# Patient Record
Sex: FEMALE | Race: WHITE | NOT HISPANIC OR LATINO | Employment: UNEMPLOYED | URBAN - METROPOLITAN AREA
[De-identification: names, ages, dates, MRNs, and addresses within clinical notes are randomized per-mention and may not be internally consistent; named-entity substitution may affect disease eponyms.]

---

## 2017-12-25 ENCOUNTER — HOSPITAL ENCOUNTER (EMERGENCY)
Facility: HOSPITAL | Age: 42
End: 2017-12-25
Attending: EMERGENCY MEDICINE | Admitting: EMERGENCY MEDICINE
Payer: COMMERCIAL

## 2017-12-25 VITALS
HEART RATE: 98 BPM | DIASTOLIC BLOOD PRESSURE: 88 MMHG | RESPIRATION RATE: 16 BRPM | WEIGHT: 150 LBS | SYSTOLIC BLOOD PRESSURE: 142 MMHG | OXYGEN SATURATION: 100 % | TEMPERATURE: 99 F

## 2017-12-25 DIAGNOSIS — F25.9 SCHIZOAFFECTIVE DISORDER (HCC): ICD-10-CM

## 2017-12-25 DIAGNOSIS — F22 PARANOID (HCC): ICD-10-CM

## 2017-12-25 DIAGNOSIS — F22 PARANOID TYPE DELUSIONAL DISORDER (HCC): Primary | ICD-10-CM

## 2017-12-25 LAB
AMPHETAMINES SERPL QL SCN: NEGATIVE
BARBITURATES UR QL: NEGATIVE
BENZODIAZ UR QL: NEGATIVE
COCAINE UR QL: NEGATIVE
ETHANOL EXG-MCNC: 0 MG/DL
METHADONE UR QL: NEGATIVE
OPIATES UR QL SCN: NEGATIVE
PCP UR QL: NEGATIVE
THC UR QL: NEGATIVE

## 2017-12-25 PROCEDURE — 80307 DRUG TEST PRSMV CHEM ANLYZR: CPT | Performed by: EMERGENCY MEDICINE

## 2017-12-25 PROCEDURE — 99285 EMERGENCY DEPT VISIT HI MDM: CPT

## 2017-12-25 PROCEDURE — 82075 ASSAY OF BREATH ETHANOL: CPT | Performed by: EMERGENCY MEDICINE

## 2017-12-25 RX ORDER — OLANZAPINE 10 MG/1
5 INJECTION, POWDER, LYOPHILIZED, FOR SOLUTION INTRAMUSCULAR ONCE
Status: DISCONTINUED | OUTPATIENT
Start: 2017-12-25 | End: 2017-12-25 | Stop reason: HOSPADM

## 2017-12-25 RX ORDER — CHOLECALCIFEROL (VITAMIN D3) 125 MCG
CAPSULE ORAL
COMMUNITY
Start: 2014-03-25 | End: 2019-12-31 | Stop reason: HOSPADM

## 2017-12-25 RX ORDER — QUETIAPINE FUMARATE 50 MG/1
50 TABLET, FILM COATED ORAL
COMMUNITY
End: 2019-12-31 | Stop reason: HOSPADM

## 2017-12-25 RX ORDER — QUETIAPINE FUMARATE 50 MG/1
TABLET, FILM COATED ORAL
COMMUNITY
Start: 2014-12-31 | End: 2017-12-25

## 2017-12-25 NOTE — ED NOTES
Upon entering pts room, pt asking explaining story about her coming into the hospital today  Pt upset with police, upset with neightbor, pt going on and on about different topics   Pt then asking questions states "must be nice to be your kind of person, to be able to call the police, let me ask you a question, your people, white people, are you born with fur on your body?"      Gila Moore RN  12/25/17 1016

## 2017-12-25 NOTE — ED NOTES
Spoke with Juanpablo Archibald at [facility] ([phone])  Medicare days have been exhausted prior authorization completed  3 days approved LCD/tea being 12/27/17  Call upon arrival for authorization

## 2017-12-25 NOTE — ED NOTES
Information faxed to   [facility]     No Beds at   [address] [facility]

## 2017-12-25 NOTE — ED NOTES
Pt concerned that she should be a 302 and not 201- spoke with ED MD Chester Reilly, RN  12/25/17 4783

## 2017-12-25 NOTE — ED NOTES
Pt refused to have vital signs taken   Pt approaching nurses desk asking about 201 vs 302, explained to pt that crisis was called and will be in to see pt      Laurie Nolan, RN  12/25/17 8887

## 2017-12-25 NOTE — ED PROVIDER NOTES
History  Chief Complaint   Patient presents with    Psychiatric Evaluation     pt brought in by EMS statinmg that someone is going on her Wi-Fi and delelting music playlist  Pt states she feels that people are coming into her appt  This is a 76-year-old female who comes in today for psychiatric evaluation  Known past medical history of her schizoaffective disorder, paranoid delusional disorder  She has spent many years hospitalized including long-term hospitalization at [facility]   The patient states that there are people breaking into her home  She had uterine a mere because the planted there however she states the mere reappeared in her house after she threw it out  She thinks people are logging onto her [employer] account stealing her music and stealing her wifi  The patient presents today in police custody after the police were summoned to her residence I somebody called 911 stating she bent down her door telling him to leave her alone and this individual claimed that the patient had a knife  Upon police arrival no knife was found  She states she did not threaten him  No SI, HI  Denies hallucinations  The patient's mother showed up and told me that they recently decreased the patient's Invega Sustena dose down to 117 mg, and mom noted that this delusions had returned  Previously, at the higher dose, she was doing well, and actually managed to finish college and obtain a bachelor's degree  Prior to Admission Medications   Prescriptions Last Dose Informant Patient Reported? Taking?    Melatonin 5 MG TABS Unknown at Unknown time  Yes No   Sig: Take by mouth   QUEtiapine (SEROquel) 50 mg tablet Unknown at Unknown time Self Yes No   Sig: Take 50 mg by mouth daily at bedtime   paliperidone palmitate (INVEGA SUSTENNA) 234 MG/1 5ML im injection Unknown at Unknown time  Yes No   Sig: Inject into the shoulder, thigh, or buttocks      Facility-Administered Medications: None     Past Medical History:   Diagnosis Date    Schizoaffective disorder ([facility] [address] )      History reviewed  No pertinent surgical history  History reviewed  No pertinent family history  I have reviewed and agree with the history as documented  Social History   Substance Use Topics    Smoking status: Current Every Day Smoker     Packs/day: 3 00    Smokeless tobacco: Never Used    Alcohol use No     Review of Systems   Constitutional: Negative for chills, fatigue, fever and unexpected weight change  HENT: Negative for congestion, rhinorrhea and sore throat  Eyes: Negative for redness and visual disturbance  Respiratory: Negative for cough and shortness of breath  Cardiovascular: Negative for chest pain and leg swelling  Gastrointestinal: Negative for abdominal pain, constipation, diarrhea, nausea and vomiting  Endocrine: Negative for cold intolerance and heat intolerance  Genitourinary: Negative for dysuria, frequency and urgency  Musculoskeletal: Negative for back pain  Skin: Negative for rash  Neurological: Negative for dizziness, syncope and numbness  Psychiatric/Behavioral: Positive for sleep disturbance  Negative for hallucinations and suicidal ideas  All other systems reviewed and are negative  Physical Exam  ED Triage Vitals   Temperature Pulse Respirations Blood Pressure SpO2   12/25/17 0517 12/25/17 0517 12/25/17 0517 12/25/17 0517 12/25/17 0517   99 °F (37 2 °C) (!) 130 18 164/99 96 %      Temp Source Heart Rate Source Patient Position - Orthostatic VS BP Location FiO2 (%)   12/25/17 0517 12/25/17 0517 12/25/17 0517 12/25/17 0517 --   Oral Monitor Sitting Left arm       Pain Score       12/25/17 1515       No Pain         Physical Exam   Constitutional: She is oriented to person, place, and time  She appears well-developed and well-nourished  No distress  HENT:   Head: Normocephalic and atraumatic  Nose: Nose normal    Mouth/Throat: No oropharyngeal exudate     Eyes: Conjunctivae and EOM are normal  Pupils are equal, round, and reactive to light  Neck: Normal range of motion  Neck supple  Cardiovascular: Normal rate, regular rhythm and normal heart sounds  Exam reveals no gallop  No murmur heard  Pulmonary/Chest: Effort normal and breath sounds normal  She has no wheezes  She exhibits no tenderness  Abdominal: Soft  Bowel sounds are normal  She exhibits no distension  There is no tenderness  There is no rebound and no guarding  Musculoskeletal: Normal range of motion  She exhibits no tenderness or deformity  Lymphadenopathy:     She has no cervical adenopathy  Neurological: She is alert and oriented to person, place, and time  No cranial nerve deficit  Skin: Skin is warm and dry  No rash noted  She is not diaphoretic  No erythema  Psychiatric: Her speech is normal  Her affect is labile  She is withdrawn  She is not actively hallucinating  Thought content is paranoid and delusional  Cognition and memory are normal  She expresses impulsivity  She expresses no homicidal and no suicidal ideation  Poor eye contact, appears clean and well nourished  Flight of ideas  Tangential speech with loose associations causing patient to ramble on  Highly paranoid about the motives of the staff and myself  Nursing note and vitals reviewed  ED Medications  Medications - No data to display    Diagnostic Studies  Results Reviewed     Procedure Component Value Units Date/Time    Rapid drug screen, urine [[id number]]  (Normal) Collected:  12/25/17 05    Lab Status:  Final result Specimen:  Urine from Urine, Clean Catch Updated:  12/25/17 0608     Amph/Meth UR Negative     Barbiturate Ur Negative     Benzodiazepine Urine Negative     Cocaine Urine Negative     Methadone Urine Negative     Opiate Urine Negative     PCP Ur Negative     THC Urine Negative    Narrative:         FOR MEDICAL PURPOSES ONLY  IF CONFIRMATION NEEDED PLEASE CONTACT THE LAB WITHIN 5 DAYS      Drug Screen Cutoff Levels:  AMPHETAMINE/METHAMPHETAMINES  1000 ng/mL  BARBITURATES     200 ng/mL  BENZODIAZEPINES     200 ng/mL  COCAINE      300 ng/mL  METHADONE      300 ng/mL  OPIATES      300 ng/mL  PHENCYCLIDINE     25 ng/mL  THC       50 ng/mL    POCT alcohol breath test [[id number]]  (Normal) Resulted:  12/25/17 0548    Lab Status:  Final result Updated:  12/25/17 0549     EXTBreath Alcohol 0 000        No orders to display       Procedures  Procedures    Phone Consults  ED Phone Contact    ED Course    A/P: This is a 36 y o  female who presents to the ED for evaluation of psych evaluation  We will check breath alcohol and UDS  Crisis referral  At this time, I am concerned about the patient's paranoid and delusions  Especially with medication changes  However, without weakness to the alleged event involving in knife, there are no grounds for 302 at this time  The patient's mother discussed with the patient and the patient reluctantly did sign a 201 to get some help to have her medications adjusted  Crisis aware  MDM  CritCare Time    Disposition  Final diagnoses:   Paranoid ([address] )   Paranoid type delusional disorder ([address] )   Schizoaffective disorder ([address] )     Time reflects when diagnosis was documented in both MDM as applicable and the Disposition within this note     Time User Action Codes Description Comment    12/25/2017  4:09 PM Ting Stern U  8  [F22] Paranoid ([address] )     12/25/2017  5:53 PM Sandra Curlin Add [F22] Paranoid type delusional disorder ([address] )     12/25/2017  5:53 PM Sandra Curlin Modify [F22] Paranoid ([address] )     12/25/2017  5:53 PM Sandra Curlin Modify [F22] Paranoid type delusional disorder ([address] )     12/25/2017  5:53 PM Sandra Curlin Add [F25 9] Schizoaffective disorder [facility])       ED Disposition     ED Disposition Condition Comment    Transfer to Another [facility] should be transferred out to [facility]        MD Stiven Viramontes Recent Value   Accepting Physician  Dr Koffi Waite Name, [address]     (Name & Tel number)  Gabriel Johnson  Transported by [employer] and Unit #)  [facility]   Sending MD Dr China Isabel Name, [address]     (Name & Tel number)  Gabriel Johnson  Transported by [employer] and Unit #)  [facility]      Follow-up Information    None       Discharge Medication List as of 12/25/2017  8:51 PM      CONTINUE these medications which have NOT CHANGED    Details   Melatonin 5 MG TABS Take by mouth, Starting Tue 3/25/2014, Historical Med      paliperidone palmitate (INVEGA SUSTENNA) 234 MG/1 5ML im injection Inject into the shoulder, thigh, or buttocks, Starting Wed 3/25/2015, Historical Med      QUEtiapine (SEROquel) 50 mg tablet Take 50 mg by mouth daily at bedtime, Historical Med           No discharge procedures on file  ED Provider  Attending physically available and evaluated Wil Magallanes I managed the patient along with the ED Attending      Electronically Signed by         Kiki Brian MD  Resident  12/25/17 7780

## 2017-12-25 NOTE — ED NOTES
CW made multiple attempts to have patient open up about reason she is in ED  Patient is very paranoid  unwilling to provide information  CW will continue to attempt Pt to disclose information

## 2017-12-25 NOTE — ED ATTENDING ATTESTATION
Jam Sanchez MD, saw and evaluated the patient  I have discussed the patient with the resident/non-physician practitioner and agree with the resident's/non-physician practitioner's findings, Plan of Care, and MDM as documented in the resident's/non-physician practitioner's note, except where noted  All available labs and Radiology studies were reviewed  At this point I agree with the current assessment done in the Emergency Department  I have conducted an independent evaluation of this patient including a focused history of:    Emergency Department Note- Nils Brown 36 y o  female MRN: [id number]    Unit/Bed#: ED 07 Encounter: [id number]    Nils Brown is a 36 y o  female who presents with   Chief Complaint   Patient presents with   [facility] Psychiatric Evaluation     pt brought in by EMS statinmg that someone is going on her Wi-Fi and delelting music playlist  Pt states she feels that people are coming into her appt  History of Present Illness   HPI:  Nils Brown is a 36 y o  female who presents for evaluation of:    Bizarre ideations  The patient believes that individuals are altering her music platelets by going through her wife eye and that multiple people are breaking into her apartment  Review of Systems   Constitutional: Positive for fatigue  Negative for fever  HENT: Negative for ear pain and sore throat  Respiratory: Negative for cough and shortness of breath  Cardiovascular: Negative for chest pain and palpitations  Psychiatric/Behavioral: Positive for dysphoric mood and hallucinations  The patient is nervous/anxious  All other systems reviewed and are negative  Historical Information   History reviewed  No pertinent past medical history  History reviewed  No pertinent surgical history    Social History   History   Alcohol Use No     History   Drug Use No     History   Smoking Status    Current Every Day Smoker    Packs/day: 3 00   Smokeless Tobacco    Never Used Family History: non-contributory    Meds/Allergies   all medications and allergies reviewed  No Known Allergies    Objective   First Vitals:   Blood Pressure: 164/99 (17)  Pulse: (!) 130 (17)  Temperature: 99 °F (37 2 °C) (17)  Temp Source: Oral (17)  Respirations: 18 (17)  Weight - Scale: 68 kg (150 lb) (17)  SpO2: 96 % (17)    Current Vitals:   Blood Pressure: 164/99 (17)  Pulse: (!) 130 (17)  Temperature: 99 °F (37 2 °C) (17)  Temp Source: Oral (17)  Respirations: 18 (17)  Weight - Scale: 68 kg (150 lb) (17)  SpO2: 96 % (17)    No intake or output data in the 24 hours ending 17    Invasive Devices          No matching active lines, drains, or airways          Physical Exam   Constitutional: She is oriented to person, place, and time  She appears well-developed and well-nourished  HENT:   Head: Normocephalic and atraumatic  Eyes: Pupils are equal, round, and reactive to light  Cardiovascular: Regular rhythm  Tachycardia present  Pulmonary/Chest: Effort normal and breath sounds normal    Abdominal: Soft  Bowel sounds are normal    Musculoskeletal: Normal range of motion  She exhibits no deformity  Neurological: She is alert and oriented to person, place, and time  Skin: Skin is warm and dry  Psychiatric:   The patient's thought content, judgment, and decision-making capacity are impaired secondary  to acute psychosis   Nursing note and vitals reviewed  Medical Decision Makin  Acute psychosis: Plan to have the psychiatric  see the patient  Plan to obtain a breath alcohol test rule out alcohol intoxication; plan to obtain urine drug screen to rule out drugs of abuse  No results found for this or any previous visit (from the past 36 hour(s))    No orders to display         Portions of the record may have been created with voice recognition software  Occasional wrong word or "sound a like" substitutions may have occurred due to the inherent limitations of voice recognition software  Read the chart carefully and recognize, using context, where substitutions have occurred

## 2017-12-25 NOTE — ED NOTES
Patient accepted by Dr Miguel Angel Damian at [facility]  Transport arranged with [facility]  at 1900  Mariana Victoria at [id number] [facility] aware of ETA

## 2017-12-25 NOTE — ED NOTES
CW worker able to have patient disclose why she was brought into ED this morning  Pt stated that her neighbors were harassing her this morning, Pt asked her neighbor to "leave her alone" her neighbor got mad at her for knocking on their door at 5am because they are [employer]  Neighbor then called the police on Pt due to patient harassing them  Patient appears to be very paranoid  During conversation Pt unable to stay on the topic  She expressed no SI/HI or VH/AH but is is very paranoid  Patient states she has been in and out of inpatient psych stays since 1999  Patient stated a doctor told her that she was Paranoid schizophrenia  Patient expressed she sees a Psychiatrist and therapist but was not willing to disclose names or last time see had a visit  Pt is cooperative but very paranoid on staff's motives  Patient agreeable for inpatient psych stay  201 was signed this morning

## 2017-12-25 NOTE — ED NOTES
Pt sitting in the corner of room with blanket over head   Pt does not have any belongings in room other than blanket and food      Juanis Cuenca RN  12/25/17 1539

## 2017-12-25 NOTE — ED NOTES
Spoke to on call Crisis worker Paulino   States that he will come in to do bed search for patient      Honor , WOLF  12/25/17 3211

## 2017-12-25 NOTE — EMTALA/ACUTE CARE TRANSFER
[facility]  Dept: Uday Bal                                         1975                              MRN [id number]    I have been informed of my rights regarding examination, treatment, and transfer   by Dr Mayur Naranjo MD    Benefits:      Risks:        Consent for Transfer:  I acknowledge that my medical condition has been evaluated and explained to me by the emergency department physician or other qualified medical person and/or my attending physician, who has recommended that I be transferred to the service of  Accepting Physician: Dr Monie Landaverde  at [address] Name, [address] : [facility]   The above potential benefits of such transfer, the potential risks associated with such transfer, and the probable risks of not being transferred have been explained to me, and I fully understand them  The doctor has explained that, in my case, the benefits of transfer outweigh the risks  I agree to be transferred  I authorize the performance of emergency medical procedures and treatments upon me in both transit and upon arrival at the receiving facility  Additionally, I authorize the release of any and all medical records to the receiving facility and request they be transported with me, if possible  I understand that the safest mode of transportation during a medical emergency is an ambulance and that the Hospital advocates the use of this mode of transport  Risks of traveling to the receiving facility by car, including absence of medical control, life sustaining equipment, such as oxygen, and medical personnel has been explained to me and I fully understand them  (GOPI CORRECT BOX BELOW)  [  ]  I consent to the stated transfer and to be transported by ambulance/helicopter    [  ]  I consent to the stated transfer, but refuse transportation by ambulance and accept full responsibility for my transportation by car  I understand the risks of non-ambulance transfers and I exonerate the Hospital and its staff from any deterioration in my condition that results from this refusal     X___________________________________________    DATE  17  TIME________  Signature of patient or legally responsible individual signing on patient behalf           RELATIONSHIP TO PATIENT_________________________          Provider Certification    NAME Saúl Alegria                                         1975                              MRN [id number]    A medical screening exam was performed on the above named patient  Based on the examination:    Condition Necessitating Transfer The primary encounter diagnosis was Paranoid type delusional disorder ([address] )  Diagnoses of Paranoid ([address] ) and Schizoaffective disorder ([address] ) were also pertinent to this visit  Patient Condition:      Reason for Transfer:      Transfer Requirements: [address]    · Space available and qualified personnel available for treatment as acknowledged by [employer]    · Agreed to accept transfer and to provide appropriate medical treatment as acknowledged by       Dr Guille Melo   · Appropriate medical records of the examination and treatment of the patient are provided at the time of transfer   [address], [address] _______  · Transfer will be performed by qualified personnel from [facility]  and appropriate transfer equipment as required, including the use of necessary and appropriate life support measures      Provider Certification: I have examined the patient and explained the following risks and benefits of being transferred/refusing transfer to the patient/family:         Based on these reasonable risks and benefits to the patient and/or the unborn child(juan), and based upon the information available at the time of the patients examination, I certify that the medical benefits reasonably to be expected from the provision of appropriate medical treatments at another medical facility outweigh the increasing risks, if any, to the individuals medical condition, and in the case of labor to the unborn child, from effecting the transfer      X____________________________________________ DATE 12/25/17        TIME_______      ORIGINAL - SEND TO MEDICAL RECORDS   COPY - SEND WITH PATIENT DURING TRANSFER

## 2017-12-26 NOTE — ED NOTES
Pt cooperative for EMS,  Brief report given to EMS with belongings        Teresa Eastman RN  12/25/17 Junior Guerrero

## 2017-12-26 NOTE — ED NOTES
Med order placed after pt on stretcher for EMS, discussed with physician need for medication in agreement no meds needed at this time       Romero Cm RN  12/25/17 1927

## 2019-12-17 ENCOUNTER — HOSPITAL ENCOUNTER (EMERGENCY)
Facility: HOSPITAL | Age: 44
End: 2019-12-18
Attending: EMERGENCY MEDICINE | Admitting: EMERGENCY MEDICINE
Payer: COMMERCIAL

## 2019-12-17 DIAGNOSIS — R45.851 SUICIDAL IDEATION: Primary | ICD-10-CM

## 2019-12-17 DIAGNOSIS — F29 PSYCHOTIC DISORDER (HCC): ICD-10-CM

## 2019-12-17 DIAGNOSIS — F22 PARANOID BEHAVIOR (HCC): ICD-10-CM

## 2019-12-17 LAB
AMPHETAMINES SERPL QL SCN: NEGATIVE
BARBITURATES UR QL: NEGATIVE
BENZODIAZ UR QL: NEGATIVE
COCAINE UR QL: NEGATIVE
ETHANOL EXG-MCNC: 0 MG/DL
EXT PREG TEST URINE: NEGATIVE
EXT. CONTROL ED NAV: NORMAL
METHADONE UR QL: NEGATIVE
OPIATES UR QL SCN: NEGATIVE
PCP UR QL: NEGATIVE
THC UR QL: NEGATIVE

## 2019-12-17 PROCEDURE — 80307 DRUG TEST PRSMV CHEM ANLYZR: CPT | Performed by: EMERGENCY MEDICINE

## 2019-12-17 PROCEDURE — 82075 ASSAY OF BREATH ETHANOL: CPT | Performed by: EMERGENCY MEDICINE

## 2019-12-17 PROCEDURE — 99285 EMERGENCY DEPT VISIT HI MDM: CPT

## 2019-12-17 PROCEDURE — 99285 EMERGENCY DEPT VISIT HI MDM: CPT | Performed by: EMERGENCY MEDICINE

## 2019-12-17 PROCEDURE — 81025 URINE PREGNANCY TEST: CPT | Performed by: EMERGENCY MEDICINE

## 2019-12-18 ENCOUNTER — HOSPITAL ENCOUNTER (INPATIENT)
Facility: HOSPITAL | Age: 44
LOS: 13 days | Discharge: HOME | DRG: 885 | End: 2019-12-31
Attending: PSYCHIATRY & NEUROLOGY | Admitting: PSYCHIATRY & NEUROLOGY
Payer: COMMERCIAL

## 2019-12-18 VITALS
RESPIRATION RATE: 20 BRPM | BODY MASS INDEX: 31.64 KG/M2 | TEMPERATURE: 98.6 F | SYSTOLIC BLOOD PRESSURE: 138 MMHG | WEIGHT: 181.44 LBS | OXYGEN SATURATION: 100 % | DIASTOLIC BLOOD PRESSURE: 87 MMHG | HEART RATE: 79 BPM

## 2019-12-18 DIAGNOSIS — F29 PSYCHOTIC DISORDER (HCC): ICD-10-CM

## 2019-12-18 DIAGNOSIS — H61.20 EXCESS EAR WAX: ICD-10-CM

## 2019-12-18 DIAGNOSIS — T78.40XA ALLERGIES: ICD-10-CM

## 2019-12-18 DIAGNOSIS — F25.9 SCHIZOAFFECTIVE DISORDER, CHRONIC CONDITION WITH ACUTE EXACERBATION (HCC): Primary | ICD-10-CM

## 2019-12-18 PROBLEM — Z00.8 MEDICAL CLEARANCE FOR PSYCHIATRIC ADMISSION: Status: ACTIVE | Noted: 2019-12-18

## 2019-12-18 PROBLEM — R03.0 ELEVATED BP WITHOUT DIAGNOSIS OF HYPERTENSION: Status: ACTIVE | Noted: 2019-12-18

## 2019-12-18 PROBLEM — Z72.0 TOBACCO ABUSE: Status: ACTIVE | Noted: 2019-12-18

## 2019-12-18 PROCEDURE — 99222 1ST HOSP IP/OBS MODERATE 55: CPT | Performed by: INTERNAL MEDICINE

## 2019-12-18 PROCEDURE — 99222 1ST HOSP IP/OBS MODERATE 55: CPT | Performed by: PSYCHIATRY & NEUROLOGY

## 2019-12-18 RX ORDER — TRAZODONE HYDROCHLORIDE 50 MG/1
50 TABLET ORAL
Status: DISCONTINUED | OUTPATIENT
Start: 2019-12-18 | End: 2019-12-31 | Stop reason: HOSPADM

## 2019-12-18 RX ORDER — BENZTROPINE MESYLATE 1 MG/1
1 TABLET ORAL EVERY 6 HOURS PRN
Status: CANCELLED | OUTPATIENT
Start: 2019-12-18

## 2019-12-18 RX ORDER — HYDROXYZINE HYDROCHLORIDE 25 MG/1
25 TABLET, FILM COATED ORAL EVERY 4 HOURS PRN
Status: DISCONTINUED | OUTPATIENT
Start: 2019-12-18 | End: 2019-12-31 | Stop reason: HOSPADM

## 2019-12-18 RX ORDER — LORAZEPAM 2 MG/ML
2 INJECTION INTRAMUSCULAR EVERY 4 HOURS PRN
Status: CANCELLED | OUTPATIENT
Start: 2019-12-18

## 2019-12-18 RX ORDER — ACETAMINOPHEN 325 MG/1
650 TABLET ORAL EVERY 6 HOURS PRN
Status: DISCONTINUED | OUTPATIENT
Start: 2019-12-18 | End: 2019-12-31 | Stop reason: HOSPADM

## 2019-12-18 RX ORDER — LORAZEPAM 2 MG/ML
2 INJECTION INTRAMUSCULAR EVERY 4 HOURS PRN
Status: DISCONTINUED | OUTPATIENT
Start: 2019-12-18 | End: 2019-12-23

## 2019-12-18 RX ORDER — TRAZODONE HYDROCHLORIDE 50 MG/1
50 TABLET ORAL
Status: CANCELLED | OUTPATIENT
Start: 2019-12-18

## 2019-12-18 RX ORDER — BENZTROPINE MESYLATE 1 MG/ML
1 INJECTION INTRAMUSCULAR; INTRAVENOUS EVERY 6 HOURS PRN
Status: CANCELLED | OUTPATIENT
Start: 2019-12-18

## 2019-12-18 RX ORDER — NICOTINE 21 MG/24HR
1 PATCH, TRANSDERMAL 24 HOURS TRANSDERMAL DAILY
Status: CANCELLED | OUTPATIENT
Start: 2019-12-18

## 2019-12-18 RX ORDER — HYDROXYZINE HYDROCHLORIDE 25 MG/1
25 TABLET, FILM COATED ORAL EVERY 4 HOURS PRN
Status: CANCELLED | OUTPATIENT
Start: 2019-12-18

## 2019-12-18 RX ORDER — NICOTINE 21 MG/24HR
1 PATCH, TRANSDERMAL 24 HOURS TRANSDERMAL DAILY
Status: DISCONTINUED | OUTPATIENT
Start: 2019-12-18 | End: 2019-12-18

## 2019-12-18 RX ORDER — OLANZAPINE 5 MG/1
5 TABLET ORAL
Status: DISCONTINUED | OUTPATIENT
Start: 2019-12-18 | End: 2019-12-31 | Stop reason: HOSPADM

## 2019-12-18 RX ORDER — OLANZAPINE 10 MG/1
5 INJECTION, POWDER, LYOPHILIZED, FOR SOLUTION INTRAMUSCULAR
Status: CANCELLED | OUTPATIENT
Start: 2019-12-18

## 2019-12-18 RX ORDER — BENZTROPINE MESYLATE 1 MG/ML
1 INJECTION INTRAMUSCULAR; INTRAVENOUS EVERY 6 HOURS PRN
Status: DISCONTINUED | OUTPATIENT
Start: 2019-12-18 | End: 2019-12-31 | Stop reason: HOSPADM

## 2019-12-18 RX ORDER — ACETAMINOPHEN 325 MG/1
650 TABLET ORAL EVERY 6 HOURS PRN
Status: CANCELLED | OUTPATIENT
Start: 2019-12-18

## 2019-12-18 RX ORDER — LORAZEPAM 0.5 MG/1
1 TABLET ORAL EVERY 4 HOURS PRN
Status: CANCELLED | OUTPATIENT
Start: 2019-12-18

## 2019-12-18 RX ORDER — LORAZEPAM 1 MG/1
1 TABLET ORAL EVERY 4 HOURS PRN
Status: DISCONTINUED | OUTPATIENT
Start: 2019-12-18 | End: 2019-12-31 | Stop reason: HOSPADM

## 2019-12-18 RX ORDER — MAGNESIUM HYDROXIDE/ALUMINUM HYDROXICE/SIMETHICONE 120; 1200; 1200 MG/30ML; MG/30ML; MG/30ML
30 SUSPENSION ORAL EVERY 4 HOURS PRN
Status: CANCELLED | OUTPATIENT
Start: 2019-12-18

## 2019-12-18 RX ORDER — OLANZAPINE 5 MG/1
5 TABLET ORAL
Status: CANCELLED | OUTPATIENT
Start: 2019-12-18

## 2019-12-18 RX ORDER — MAGNESIUM HYDROXIDE/ALUMINUM HYDROXICE/SIMETHICONE 120; 1200; 1200 MG/30ML; MG/30ML; MG/30ML
30 SUSPENSION ORAL EVERY 4 HOURS PRN
Status: DISCONTINUED | OUTPATIENT
Start: 2019-12-18 | End: 2019-12-31 | Stop reason: HOSPADM

## 2019-12-18 RX ORDER — OLANZAPINE 10 MG/1
5 INJECTION, POWDER, LYOPHILIZED, FOR SOLUTION INTRAMUSCULAR
Status: DISCONTINUED | OUTPATIENT
Start: 2019-12-18 | End: 2019-12-31 | Stop reason: HOSPADM

## 2019-12-18 RX ORDER — QUETIAPINE FUMARATE 50 MG/1
50 TABLET, FILM COATED ORAL
Status: DISCONTINUED | OUTPATIENT
Start: 2019-12-18 | End: 2019-12-19

## 2019-12-18 RX ORDER — BENZTROPINE MESYLATE 1 MG/1
1 TABLET ORAL EVERY 6 HOURS PRN
Status: DISCONTINUED | OUTPATIENT
Start: 2019-12-18 | End: 2019-12-31 | Stop reason: HOSPADM

## 2019-12-18 RX ADMIN — PALIPERIDONE PALMITATE 156 MG: 156 INJECTION INTRAMUSCULAR at 18:06

## 2019-12-18 NOTE — ED NOTES
Patient belongings placed in [address] [facility] room     [facility]  12/17/19 1926
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
## 2019-12-18 NOTE — PROGRESS NOTES
[employer] NOTE     12/18/19 0900   Activity/Group Checklist   Group Community meeting   Attendance Attended   Attendance Duration (min) 16-30   Interactions Interacted appropriately  (Easily distracted by peers, difficulty remaining on topic  )   Affect/Mood Appropriate   Goals Achieved Able to listen to others; Able to engage in interactions; Able to self-disclose   Objectives/Key Points:  Living intentionally  Goal Setting  Thought for the Day  Self Check In
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
## 2019-12-18 NOTE — ED NOTES
Patient was accepted at [facility] by Dr Earnest Rodriguez  Transport was scheduled with Ge/ALVIN for p/u at [phone]  Ge advised if anything would change, he would call the ED  Report to be called prior to transfer at [phone]

## 2019-12-18 NOTE — ASSESSMENT & PLAN NOTE
Vitals:    12/18/19 0900   BP: 145/93   Pulse: 89   Resp: 16   Temp: 97 9 °F (36 6 °C)   SpO2: 100%     Patient's blood pressure noted to be elevated, will advise nursing staff to continue to monitor and notify IM if blood pressures remain elevated, may need to consider starting blood pressure medication
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
## 2019-12-18 NOTE — ED NOTES
201 faxed to [facility] for review at [facility]       ROSELYN Goff  12/17/19   4922

## 2019-12-18 NOTE — ASSESSMENT & PLAN NOTE
Patient cleared for psychiatric admission, no labs or EKG available at time of consult  Please continue to monitor patient's blood pressures if consistent elevations may need to start blood pressure medication  We will now sign off please feel free to call if further assistance is needed form internal medicine
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
## 2019-12-18 NOTE — ED NOTES
Dr Joshua Burton at bedside       Davin Wilson  12/17/19 [address] [facility]  12/17/19 1940 135
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
## 2019-12-18 NOTE — DISCHARGE INSTR - OTHER ORDERS
[facility] Crisis Phone Number : [address] [place] [address] Crisis Phone Number : [phone]    National Suicide Hotline : 1 976.226.9311    Crisis Text Line : 587844    The [facility] Family-to-Family Education Program is a free 12-week (2 1/2 hours/week) course for families of individuals with severe brain disorders (mental illnesses)  The classes are taught by trained family members  All course materials are furnished at no cost to you  Below are some details  To register, e-mail [email or web address] or call [phone]  The curriculum focuses on schizophrenia, bipolar disorder (manic depression), clinical depression, panic disorders and obsessive-compulsive disorder (OCD)  The course discusses the clinical treatment of these illnesses and teaches the knowledge and skills that family members need to cope more effectively  Topics Include:   Learning about feelings, learning about facts    Schizophrenia, major depression and damian: diagnosis and dealing with critical periods    Subtypes of depression and bipolar disorder, panic disorder and OCD; diagnosis and causes; sharing our stories    The biology of the brain/new research    Problem solving workshops    Medication review    Empathy workshop  what its like to have a brain disorder    Communication skills workshop    Self-care and relative groups   [facility], services available    Advocacy: fighting stigma    Review and certification ceremony    [facility] Education Course  The [employer] Education class is a ten week  two hours per week  experiential education course on the topic of recovery for any person with serious mental illness who is interested in establishing and maintaining wellness  The course uses a combination of lecture, interactive exercises, and structural group processes  The diversity of experience among participants affords for a lively dynamic that moves the course along    [facility]-fw[facility]Ddj[facility] Education class is offered free of charge to people who experience mental illness  You do not need to be a member of [facility] to take the course  Courses are taught by teams of trained mentors/peer-teachers who are themselves experienced at living well with mental illness  Below are some details  To register, call [phone] or e-mail [email or web address]  Sign up today! [facility] group is for family members, caregivers, and loved ones of individuals living with the everyday challenges of mental illness  The leaders are family members in the same situation  Sessions take place in an intimate, confidential setting to allow families to share openly with each other  These support groups allow participants to learn from the experiences of other group members, share coping strategies, and offer each other encouragement and understanding  Regine Mcclellan know that you are not alone  Drop inno registration is necessary  Here are the times and locations  JANE  Monthly: 3rd Monday, 7:00-8:30 pm  [facility] [place]  Monthly: 4th Tuesday, 7:00-8:30 pm  [address] [facility]  Monthly: 1st Monday, 7:00-8:30 pm  [address]         Monthly Support for Persons with Mental Illness  The Peer Support Group is a monthly meeting for individuals facing the challenges of recovering from severe and persistent mental illness  Depression, manic depression, schizophrenia, and general anxiety disorder are only a few of the diagnoses of individuals who have found a supportive place at our meetings  Our [place]  We are a fellowship of individuals who share a common goal of recovery and the ability to maintain mental and emotional stability  We help others and ourselves through sharing our experiences, strength and hope with each other   No matter how traumatic our past or how despairing our present may be, there is hope for a new day  Sessions take place in an intimate, confidential setting to allow individuals to share openly with each other  Bryant Lucks know that you are not alone  Drop inno registration is necessary  Here are the times and locations    ANITHA  Monthly: 1st Monday, 7:00-8:30 pm  [facility]  [address] [place], [place]   [id number]  Monthly: 3rd Monday, 7:00-8:30 pm  [address]  [address] [facility]

## 2019-12-18 NOTE — PROGRESS NOTES
12/18/19 1300   Activity/Group Checklist   Group   (recovery group)   Attendance Attended   Attendance Duration (min) 31-45  (casemanager came left early)   Interactions Interacted appropriately   Affect/Mood Blunted/flat   Goals Achieved Able to listen to others
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
## 2019-12-18 NOTE — EMTALA/ACUTE CARE TRANSFER
[facility] [address]  Dept: [phone]      HZLOON TRANSFER CONSENT    NAME Anisa Kidd                                         1975                              MRN [id number]    I have been informed of my rights regarding examination, treatment, and transfer   by Dr Gladis Zuleta MD    Benefits: Specialized equipment and/or services available at the receiving facility (Include comment)________________________(behavioral health)    Risks: Potential for delay in receiving treatment, Increased discomfort during transfer      Consent for Transfer:  I acknowledge that my medical condition has been evaluated and explained to me by the emergency department physician or other qualified medical person and/or my attending physician, who has recommended that I be transferred to the service of  Accepting Physician: Rahul Jose at [address] Name, [address] : [facility]  The above potential benefits of such transfer, the potential risks associated with such transfer, and the probable risks of not being transferred have been explained to me, and I fully understand them  The doctor has explained that, in my case, the benefits of transfer outweigh the risks  I agree to be transferred  I authorize the performance of emergency medical procedures and treatments upon me in both transit and upon arrival at the receiving facility  Additionally, I authorize the release of any and all medical records to the receiving facility and request they be transported with me, if possible  I understand that the safest mode of transportation during a medical emergency is an ambulance and that the Hospital advocates the use of this mode of transport   Risks of traveling to the receiving facility by car, including absence of medical control, life sustaining equipment, such as oxygen, and medical personnel has been explained to me and I fully understand them     ([facility])  [  ]  I consent to the stated transfer and to be transported by ambulance/helicopter  [  ]  I consent to the stated transfer, but refuse transportation by ambulance and accept full responsibility for my transportation by car  I understand the risks of non-ambulance transfers and I exonerate the Hospital and its staff from any deterioration in my condition that results from this refusal     X___________________________________________    DATE  19  TIME________  Signature of patient or legally responsible individual signing on patient behalf           RELATIONSHIP TO PATIENT_________________________          Provider Certification    NAME Shiva Jerez                                        [facility] 1975                              MRN [id number]    A medical screening exam was performed on the above named patient  Based on the examination:    Condition Necessitating Transfer The primary encounter diagnosis was Suicidal ideation  Diagnoses of Psychotic disorder ([facility] [address] ) and Paranoid behavior ([facility] [address] ) were also pertinent to this visit      Patient Condition: The patient has been stabilized such that within reasonable medical probability, no material deterioration of the patient condition or the condition of the unborn child(juan) is likely to result from the transfer    Reason for Transfer: Level of Care needed not available at this facility(behavioral health)    Transfer Requirements: Facility [facility]   · Space available and qualified personnel available for treatment as acknowledged by    · Agreed to accept transfer and to provide appropriate medical treatment as acknowledged by       [facility]  · Appropriate medical records of the examination and treatment of the patient are provided at the time of transfer   [address] _______  · Transfer will be performed by qualified personnel from [facility]  and appropriate transfer equipment as required, including the use of necessary and appropriate life support measures  Provider Certification: I have examined the patient and explained the following risks and benefits of being transferred/refusing transfer to the patient/family:  General risk, such as traffic hazards, adverse weather conditions, rough terrain or turbulence, possible failure of equipment (including vehicle or aircraft), or consequences of actions of persons outside the control of the transport personnel, Unanticipated needs of medical equipment and personnel during transport, Risk of worsening condition      Based on these reasonable risks and benefits to the patient and/or the unborn child(juan), and based upon the information available at the time of the patients examination, I certify that the medical benefits reasonably to be expected from the provision of appropriate medical treatments at another medical facility outweigh the increasing risks, if any, to the individuals medical condition, and in the case of labor to the unborn child, from effecting the transfer      X____________________________________________ DATE 12/18/19        TIME_______      ORIGINAL - SEND TO MEDICAL RECORDS   COPY - SEND WITH PATIENT DURING TRANSFER

## 2019-12-18 NOTE — CONSULTS
Telepsychiatry Telephone Admission Note    I was consulted by phone to review the case of this 36yo woman who was medically cleared and admitted for psychosis  PMH negative for significant medical issues  NKDA  AVSS, reassuring admission labs  UDS/BAL negative  Pregnancy test negative  Plan:   --Admit to psychiatry under voluntary status  --Suicide precautions  --Routine admission orders placed

## 2019-12-18 NOTE — PROGRESS NOTES
[employer] NOTE     12/18/19 1000   Activity/Group Checklist   Group Other (Comment)  (Altruism/Sharing Gifts)   Attendance Attended   Attendance Duration (min) Greater than 60   Interactions Interacted appropriately  (Sat among peers  Task focused  Minimal interaction  )   Affect/Mood Appropriate   Goals Achieved Able to listen to others; Able to engage in interactions; Able to reflect/comment on own behavior  (Discussed Family Norms and Expectations/cultural factors  )

## 2019-12-18 NOTE — ED NOTES
Insurance Authorization:    [employer] [facility] form faxed for authorization  Spoke to [employer] @ [facility] [phone] for COB  7-Day COB Authorization, requesting contact when patient arrives

## 2019-12-18 NOTE — PROGRESS NOTES
Coleen Sneed was given her [place]  She said that she is currently homeless  Said no matter where she lived she felt like someone was out to get her and it could be gangs,or just being stalked  Pleasant and cooperative and somewhat social with peers

## 2019-12-19 LAB
ALBUMIN SERPL BCP-MCNC: 3.7 G/DL (ref 3–5.2)
ALP SERPL-CCNC: 64 U/L (ref 43–122)
ALT SERPL W P-5'-P-CCNC: 18 U/L (ref 9–52)
ANION GAP SERPL CALCULATED.3IONS-SCNC: 8 MMOL/L (ref 5–14)
AST SERPL W P-5'-P-CCNC: 15 U/L (ref 14–36)
BASOPHILS # BLD AUTO: 0 THOUSANDS/ΜL (ref 0–0.1)
BASOPHILS NFR BLD AUTO: 1 % (ref 0–1)
BILIRUB SERPL-MCNC: 0.4 MG/DL
BUN SERPL-MCNC: 9 MG/DL (ref 5–25)
CALCIUM SERPL-MCNC: 9.1 MG/DL (ref 8.4–10.2)
CHLORIDE SERPL-SCNC: 105 MMOL/L (ref 97–108)
CHOLEST SERPL-MCNC: 175 MG/DL
CO2 SERPL-SCNC: 26 MMOL/L (ref 22–30)
CREAT SERPL-MCNC: 0.79 MG/DL (ref 0.6–1.2)
EOSINOPHIL # BLD AUTO: 0.3 THOUSAND/ΜL (ref 0–0.4)
EOSINOPHIL NFR BLD AUTO: 3 % (ref 0–6)
ERYTHROCYTE [DISTWIDTH] IN BLOOD BY AUTOMATED COUNT: 14.4 %
EST. AVERAGE GLUCOSE BLD GHB EST-MCNC: 137 MG/DL
GFR SERPL CREATININE-BSD FRML MDRD: 91 ML/MIN/1.73SQ M
GLUCOSE SERPL-MCNC: 115 MG/DL (ref 70–99)
HBA1C MFR BLD: 6.4 % (ref 4.2–6.3)
HCT VFR BLD AUTO: 39.7 % (ref 36–46)
HDLC SERPL-MCNC: 21 MG/DL
HGB BLD-MCNC: 13.6 G/DL (ref 12–16)
LDLC SERPL CALC-MCNC: 131 MG/DL
LYMPHOCYTES # BLD AUTO: 2.1 THOUSANDS/ΜL (ref 0.5–4)
LYMPHOCYTES NFR BLD AUTO: 23 % (ref 25–45)
MCH RBC QN AUTO: 29.6 PG (ref 26–34)
MCHC RBC AUTO-ENTMCNC: 34.2 G/DL (ref 31–36)
MCV RBC AUTO: 87 FL (ref 80–100)
MONOCYTES # BLD AUTO: 0.5 THOUSAND/ΜL (ref 0.2–0.9)
MONOCYTES NFR BLD AUTO: 6 % (ref 1–10)
NEUTROPHILS # BLD AUTO: 6.3 THOUSANDS/ΜL (ref 1.8–7.8)
NEUTS SEG NFR BLD AUTO: 68 % (ref 45–65)
NONHDLC SERPL-MCNC: 154 MG/DL
PLATELET # BLD AUTO: 336 THOUSANDS/UL (ref 150–450)
PMV BLD AUTO: 8.3 FL (ref 8.9–12.7)
POTASSIUM SERPL-SCNC: 3.6 MMOL/L (ref 3.6–5)
PROT SERPL-MCNC: 7.2 G/DL (ref 5.9–8.4)
RBC # BLD AUTO: 4.59 MILLION/UL (ref 4–5.2)
SODIUM SERPL-SCNC: 139 MMOL/L (ref 137–147)
TRIGL SERPL-MCNC: 114 MG/DL
TSH SERPL DL<=0.05 MIU/L-ACNC: 1.39 UIU/ML (ref 0.47–4.68)
WBC # BLD AUTO: 9.3 THOUSAND/UL (ref 4.5–11)

## 2019-12-19 PROCEDURE — 99232 SBSQ HOSP IP/OBS MODERATE 35: CPT | Performed by: NURSE PRACTITIONER

## 2019-12-19 PROCEDURE — 80061 LIPID PANEL: CPT | Performed by: PSYCHIATRY & NEUROLOGY

## 2019-12-19 PROCEDURE — 85025 COMPLETE CBC W/AUTO DIFF WBC: CPT | Performed by: PSYCHIATRY & NEUROLOGY

## 2019-12-19 PROCEDURE — 83036 HEMOGLOBIN GLYCOSYLATED A1C: CPT | Performed by: NURSE PRACTITIONER

## 2019-12-19 PROCEDURE — 84443 ASSAY THYROID STIM HORMONE: CPT | Performed by: PSYCHIATRY & NEUROLOGY

## 2019-12-19 PROCEDURE — 80053 COMPREHEN METABOLIC PANEL: CPT | Performed by: PSYCHIATRY & NEUROLOGY

## 2019-12-19 NOTE — TREATMENT PLAN
TREATMENT PLAN REVIEW - [facility] 38 y o  1975 female MRN: [id number]    [address] [address] Room / Bed: Lissette Jimenes [facility] Encounter: [id number]          Admit Date/Time:  12/18/2019  8:56 AM    Treatment Team: Attending Provider: Bijan Hatfield MD; Consulting Physician: Yoshi Loyd; Patient Care Assistant: Lamonte Murdock;  Patient Care Technician: Cele Murry; Patient Care Technician: Sofía Pino; Registered Nurse: Patel Haji RN    Diagnosis: Principal Problem:    Schizoaffective disorder, chronic condition with acute exacerbation ([facility] [address] )  Active Problems:    Elevated BP without diagnosis of hypertension    Tobacco abuse    Medical clearance for psychiatric admission    Patient Strengths: compliant with medication     Patient Barriers: chronic mental illness    Short Term Goals: decrease in anxiety symptoms, ability to stay safe on the unit    Long Term Goals: stabilization of mood, improved reality testing    Progress Towards Goals: starting psychitric medications as prescribed, continue psychiatric medications as prescribed    Recommended Treatment: medication management, patient medication education, group therapy, milieu therapy, continued Behavioral Health psychiatric evaluation/assessment process     Treatment Frequency: daily medication monitoring, group and milieu therapy daily, monitoring through interdisciplinary rounds, monitoring through weekly patient care conferences    Expected Discharge Date:  5-7 days    Discharge Plan: referral for outpatient medication management with a psychiatrist, referral for outpatient psychotherapy, referral to [address] [place] [address] Team for close psychiatric monitoring    Treatment Plan Created/Updated By: Bijan Hatfield MD

## 2019-12-19 NOTE — PROGRESS NOTES
12/19/19 0841   Team Meeting   Meeting Type Daily Rounds   Initial Conference Date 12/19/19   Team Members Present   Team Members Present Physician;Nurse;   Physician Team Member Claudia ROSAS   Nursing Team Member [place] Management Team Member Megan Herbert   Patient/Family Present   Patient Present No   Patient's Family Present No   New admission, chart and labs were reviewed  Meds to be discussed and started  Discharge to be determined

## 2019-12-19 NOTE — PROGRESS NOTES
12/19/19 1233   Team Meeting   Meeting Type Tx Team Meeting   Initial Conference Date 12/19/19   Team Members Present   Team Members Present Physician;Nurse;   Physician Team Member Dr Annetta Hahn Team Member [facility] Team Member Warren Moore   Patient/Family Present   Patient Present Yes   Patient's Family Present No   Pt seen for tx team meeting  Psychiatrist reviewed tx plan with pt  Pt explained that she would not "cry to certain people" about "being gangstalked"  Pt reports being gangstalked is like being bullied  Pt explained members of [facility] were gangstalking her  Pt reports they make fun of her because her skin is brown, she is poor and is a different species  Pt reports the gangstalkers are of a different species  Pt explained that once she is discharged, there is to be no further relationship with the staff at the hospital  Pt hesitant to sign tx plan  Psychiatrist reviewed tx plan with pt  Nurse made copy of tx plan for pt  Pt then signed the plan  Strengths: Med compliant, outpatient intake already scheduled, has insurance  Barriers: chronic mental health, lack of supports, homeless

## 2019-12-19 NOTE — PROGRESS NOTES
Patient isolative to her room this morning  Patient states she "feels better than when she came in" Denies anxiety, depression, S/HI,A/VH and pain  Meal compliant  Isolative and withdrawn  Pleasant  Will continue to monitor and therapeutic support

## 2019-12-19 NOTE — PROGRESS NOTES
Progress Note - [address] Quijano 38 y o  female MRN: [id number]  Unit/Bed#: [facility] 124-46 Encounter: [id number]    The patient was seen for continuing care and reviewed with treatment team  Staff reports that the patient remains paranoid, delusional, and self isolating to room  During assessment the patient continues to exhibit rambling speech, disorganized thought process, and increased anxiety and paranoia  Patient reports "I can't take Seroquel  I used to take that and I missed it for days  Then I had insomnia for months " Patient continues to report depression a 5 out of 10 and mood lability  Patient continues to report that people are stalking her and states "this relationship is business  You will not follow me into my home " Patient denies any auditory or visual hallucinations  Denies any thoughts to hurt herself or others  Patient received Beronica Juarez yesterday  No current medication changes   Will discontinue Seroquel    Mental Status Evaluation:  Appearance:  Adequate hygiene and grooming   Behavior:  cooperative; pleasant on approach   Fund of knowledge  Diminished   Speech:   Language: Rapid and Pressured; rambling  No overt abnormality   Mood:  irritable   Affect:   Associations: labile  Loosely connected   Thought Process:  Circumstantial, Tangential and disorganized   Thought Content:  Paranoid and mistrustful, Delusions of persecution, Grandiose delusions and Obsessive ruminations   Perceptual Disturbances: Uncertain   Risk Potential: No suicidal or homicidal ideation   Orientation  Oriented to place and person   Memory Not tested   Attention/Concentration attention span appeared shorter than expected for age   Insight:  No insight   Judgment: Poor judgment   Gait/Station: normal gait/station and normal balance   Motor Activity: No abnormal movement noted     Progress Toward Goals: unchanged    Assessment/Plan    Principal Problem:    Schizoaffective disorder, chronic condition with acute exacerbation ([facility] [address] )  Active Problems:    Elevated BP without diagnosis of hypertension    Tobacco abuse    Medical clearance for psychiatric admission      Recommended Treatment: Continue with pharmacotherapy, group therapy, milieu therapy and occupational therapy  The patient will be maintained on the following medications:    Current Facility-Administered Medications:  acetaminophen 650 mg Oral Q6H PRN Arie Dias MD   aluminum-magnesium hydroxide-simethicone 30 mL Oral Q4H PRN Arie Dias MD   benztropine 1 mg Intramuscular Q6H PRN Arie Dias MD   benztropine 1 mg Oral Q6H PRN Arie Dias MD   hydrOXYzine HCL 25 mg Oral Q4H PRN Arie Dias MD   LORazepam 2 mg Intramuscular Q4H PRN Arie Dias MD   LORazepam 1 mg Oral Q4H PRN Arie Dias MD   magnesium hydroxide 30 mL Oral Daily PRN Arie Dias MD   nicotine polacrilex 2 mg Oral Q2H PRN Bird Cervantes MD   OLANZapine 5 mg Intramuscular Q3H PRN Arie Dias MD   OLANZapine 5 mg Oral Q3H PRN Arie Dias MD   paliperidone palmitate  mg Intramuscular Q30 Days RALPH Rosales   QUEtiapine 50 mg Oral HS Bird Cervantes MD   traZODone 50 mg Oral HS PRILIA Dias MD       Risks, benefits and possible side effects of Medications:   Risks, benefits, and possible side effects of medications explained to patient and patient verbalizes understanding

## 2019-12-19 NOTE — PROGRESS NOTES
12/19/19 1300   Activity/Group Checklist   Group   (recovery group)   Attendance Attended   Attendance Duration (min) 46-60   Interactions Interacted appropriately   Affect/Mood Appropriate   Goals Achieved Able to listen to others; Able to engage in interactions; Discussed coping strategies; Able to self-disclose; Able to recieve feedback   Coping skills worksheet

## 2019-12-19 NOTE — PROGRESS NOTES
12/19/19 1000   Activity/Group Checklist   Group Other (Comment)  (Art Therapy Process Group/Open Choice, Discussion)   Attendance Attended   Attendance Duration (min) Greater than 60   Interactions Interacted appropriately   Affect/Mood Appropriate   Goals Achieved Able to listen to others; Able to engage in interactions; Able to recieve feedback; Able to give feedback to another  (Able to engage materials; full participation)

## 2019-12-19 NOTE — PROGRESS NOTES
Patient refused HS Seroquel, stating it causes insomnia, and causes her to toss and turn  Patient is requesting it to be discontinued  Will continue to monitor

## 2019-12-20 PROCEDURE — 99232 SBSQ HOSP IP/OBS MODERATE 35: CPT | Performed by: NURSE PRACTITIONER

## 2019-12-20 NOTE — NURSING NOTE
Pt stated she wanted to rest after breakfast,but will be up for groups  Pt reported good sleep,denies any depression or anxiety,denies any thoughts of self harm  Pt denies any AH or VH,however appeared somewhat paranoid and suspicious  med compliant  No distress noted  Will continue to monitor closely

## 2019-12-20 NOTE — PROGRESS NOTES
12/20/19 0806   Team Meeting   Meeting Type Daily Rounds   Initial Conference Date 12/20/19   Team Members Present   Team Members Present Physician;Nurse;   Physician Team Member Dr Meri Trinh Team Member Martín Amador Management Team Member Nola Byrd   Patient/Family Present   Patient Present No   Patient's Family Present No   Oral Shardaashley Francisco will be added until Sharda Francisco IM begins working

## 2019-12-20 NOTE — PLAN OF CARE
Problem: SELF HARM/SUICIDALITY  Goal: Will have no self-injury during hospital stay  Description  INTERVENTIONS:  - Q 15 MINUTES: Routine safety checks  - Q WAKING SHIFT & PRN: Assess risk to determine if routine checks are adequate to maintain patient safety  - Encourage patient to participate actively in care by formulating a plan to combat response to suicidal ideation, identify supports and resources  Outcome: Progressing     Problem: PSYCHOSIS  Goal: Will report no hallucinations or delusions  Description  Interventions:  - Administer medication as  ordered  - Every waking shifts and PRN assess for the presence of hallucinations and or delusions  - Assist with reality testing to support increasing orientation  - Assess if patient's hallucinations or delusions are encouraging self-harm or harm to others and intervene as appropriate  Outcome: Progressing     Problem: ANXIETY  Goal: Will report anxiety at manageable levels  Description  INTERVENTIONS:  - Administer medication as ordered  - Teach and encourage coping skills  - Provide emotional support  - Assess patient/family for anxiety and ability to cope  Outcome: Progressing  Goal: By discharge: Patient will verbalize 2 strategies to deal with anxiety  Description  Interventions:  - Identify any obvious source/trigger to anxiety  - Staff will assist patient in applying identified coping technique/skills  - Encourage attendance of scheduled groups and activities  Outcome: Progressing     Problem: SLEEP DISTURBANCE  Goal: Will exhibit normal sleeping pattern  Description  Interventions:  -  Assess the patients sleep pattern, noting recent changes  - Administer medication as ordered  - Decrease environmental stimuli, including noise, as appropriate during the night  - Encourage the patient to actively participate in unit groups and or exercise during the day to enhance ability to achieve adequate sleep at night  - Assess the patient, in the morning, encouraging a description of sleep experience  Outcome: Progressing     Problem: DISCHARGE PLANNING - CARE MANAGEMENT  Goal: Discharge to post-acute care or home with appropriate resources  Description  INTERVENTIONS:  - Conduct assessment to determine patient/family and health care team treatment goals, and need for post-acute services based on payer coverage, community resources, and patient preferences, and barriers to discharge  - Address psychosocial, clinical, and financial barriers to discharge as identified in assessment in conjunction with the patient/family and health care team  - Arrange appropriate level of post-acute services according to patients   needs and preference and payer coverage in collaboration with the physician and health care team  - Communicate with and update the patient/family, physician, and health care team regarding progress on the discharge plan  - Arrange appropriate transportation to post-acute venues  Outcome: Progressing     Problem: Ineffective Coping  Goal: Participates in unit activities  Description  Interventions:  - Provide therapeutic environment   - Provide required programming   - Redirect inappropriate behaviors   Outcome: Progressing

## 2019-12-20 NOTE — PROGRESS NOTES
[employer] NOTE  Full, Active, participation  12/20/19 6940   Activity/Group Checklist   Group Personal control  (Sridhar Chi)   Attendance Attended   Attendance Duration (min) 16-30   Interactions Interacted appropriately   Affect/Mood Appropriate   Goals Achieved Able to listen to others; Able to engage in interactions   Learning Objectives  Mind/Body Connection  Relaxation as part of daily Routine  Relaxation as coping strategy for emotional distress  Benefits of Proactive Relaxation  Modified Sridhar Chi Technique (movement and breath)

## 2019-12-20 NOTE — PROGRESS NOTES
Pt has been pleasant and cooperative, but isolative to herself  Pt is not scheduled meds but is meal compliant  Pt did not report any anxiety, depression, SI, HI, AH, or VH  Will continue to monitor

## 2019-12-20 NOTE — PROGRESS NOTES
12/20/19 1000   Activity/Group Checklist   Group Other (Comment)  (Art Therapy Process Group/Open Choice, Discussion)   Attendance Attended   Attendance Duration (min) Greater than 60   Interactions Interacted appropriately  (Some disorganized thinking present)   Affect/Mood Appropriate   Goals Achieved Able to listen to others; Able to engage in interactions; Able to recieve feedback; Able to give feedback to another  (Able to engage materials   full participation)

## 2019-12-20 NOTE — PROGRESS NOTES
Patient friendly with another patient on unit, WOODY ARREDONDO  Patient has an irritable edge, guarded, and appears paranoid  Patient denies SI/HI and A/V hallucinations  Patient out on unit in the morning and now resting in her room

## 2019-12-20 NOTE — PROGRESS NOTES
Progress Note - 14 Jennifer Quijano 38 y o  female MRN: [id number]  Unit/Bed#: Erica Beebe 888-72 Encounter: [id number]    The patient was seen for continuing care and reviewed with treatment team  Staff reports that the patient remains calm, cooperative, and compliant with medications  Attending groups  During assessment the patient remain irritable, delusional, and tangential with loose associations present  Illogical at length and disorganized in conversation  Difficult to express needs  Unable to elaborate on questions asked and remains paranoid and suspicious of others  "I am a different species and people can't accept that " Patient denies any current thoughts to hurt others  Symptoms are not significantly improving  Continues to lack insight into mental health  Will continue to monitor symptoms and behavioral control       Mental Status Evaluation:  Appearance:  Adequate hygiene and grooming   Behavior:  cooperative, guarded and Superficial   Fund of knowledge  Poor   Speech:   Language: Rambling; tangential  No overt abnormality   Mood:  anxious   Affect:   Associations: constricted  Loosely connected   Thought Process:  Circumstantial, Tangential, disorganized and Flight of ideas   Thought Content:  Paranoid and mistrustful, Delusions of persecution, Grandiose delusions and Obsessive ruminations   Perceptual Disturbances: Uncertain   Risk Potential: No suicidal or homicidal ideation   Orientation  Oriented x 3   Memory Not tested   Attention/Concentration attention span appeared shorter than expected for age   Insight:  No insight   Judgment: Poor judgment   Gait/Station: normal gait/station and normal balance   Motor Activity: No abnormal movement noted     Progress Toward Goals: unchanged    Assessment/Plan    Principal Problem:    Schizoaffective disorder, chronic condition with acute exacerbation ([facility] [address] )  Active Problems:    Elevated BP without diagnosis of hypertension    Tobacco abuse Medical clearance for psychiatric admission      Recommended Treatment: Continue with pharmacotherapy, group therapy, milieu therapy and occupational therapy  The patient will be maintained on the following medications:    Current Facility-Administered Medications:  acetaminophen 650 mg Oral Q6H PRN Rodríguez Greenberg MD   aluminum-magnesium hydroxide-simethicone 30 mL Oral Q4H PRN Rodríguez Greenberg MD   benztropine 1 mg Intramuscular Q6H PRN Rodríguez Greenberg MD   benztropine 1 mg Oral Q6H PRN Rodríguez Greenberg MD   hydrOXYzine HCL 25 mg Oral Q4H PRN Rodríguez Greenberg, MD   LORazepam 2 mg Intramuscular Q4H PRN Rodríguez Greenberg MD   LORazepam 1 mg Oral Q4H PRN Rodríguez Greenberg MD   magnesium hydroxide 30 mL Oral Daily PRN Rodríguez Greenberg MD   nicotine polacrilex 2 mg Oral Q2H PRN Robbigregory Hardinglo, MD   OLANZapine 5 mg Intramuscular Q3H PRILIA Greenberg MD   OLANZapine 5 mg Oral Q3H PRN Rodríguez Greenberg MD   paliperidone palmitate  mg Intramuscular Q30 Days RALPH Feliciano   traZODone 50 mg Oral HS PRN Rodríguez Greenberg MD       Risks, benefits and possible side effects of Medications:   Risks, benefits, and possible side effects of medications explained to patient and patient verbalizes understanding

## 2019-12-20 NOTE — PROGRESS NOTES
[facility] GROUP NOTE     12/20/19 0930   Activity/Group Checklist   Group Community meeting   Attendance Attended   Attendance Duration (min) 16-30   Interactions Interacted appropriately   Affect/Mood Appropriate   Goals Achieved Able to listen to others; Able to engage in interactions   Objectives/Key Points:  Living intentionally  Goal Setting  Thought for the Day  Self Check In

## 2019-12-21 PROCEDURE — 99232 SBSQ HOSP IP/OBS MODERATE 35: CPT | Performed by: PSYCHIATRY & NEUROLOGY

## 2019-12-21 NOTE — PROGRESS NOTES
12/21/19 0900   Team Meeting   Meeting Type Daily Rounds   Initial Conference Date 12/21/19   Team Members Present   Team Members Present Physician;Nurse   Physician Team Member Dr Yulisa Birch Team Member Dacia Brennan   Patient/Family Present   Patient Present No   Patient's Family Present No   Medical, labs and medications reviewed by team  Discharge to be determined by attending  Medication changes to be assessed

## 2019-12-21 NOTE — PROGRESS NOTES
Patient out on unit doing [employer] puzzle  Patient is cooperative, pleasant  Patient denies SI/HI and denies A/V hallucinations  Patient reports an improved mood, no issues sleeping, and good appetite  Patient has not exhibited delusional thoughts or disorganized thinking

## 2019-12-21 NOTE — NURSING NOTE
Patient visible briefly at start of shift before retreating to her room for the night  No scheduled HS medications and no PRNs needed  Will monitor

## 2019-12-21 NOTE — PROGRESS NOTES
12/21/19 1400   Activity/Group Checklist   Group Other (Comment)  (Art Therapy Group/OPEN STUDIO, Social Interaction-Expression)   Attendance Attended   Attendance Duration (min) Greater than 60   Interactions Interacted appropriately   Affect/Mood Appropriate   Goals Achieved Able to listen to others; Able to engage in interactions; Able to recieve feedback; Able to give feedback to another

## 2019-12-21 NOTE — PROGRESS NOTES
Progress Note - [address] Simon Quijano 38 y o  female MRN: [id number]   Unit/Bed#: Vasu Hernandez 374-02 Encounter: [id number]      Subjective: The patient was seen in her room along with the nurse  Patient reports her mood has been good  She denies any concern today  Denies any suicidal or homicidal thoughts  Reports sleep has been good  Reports appetite has been well  Denies any auditory or visual hallucination  Reports compliant with the medication and denies any side effect  The patient got Invega Sustenna 156 mg IM 3 days back  Denies any other concern today  As per the nurse the patient has been doing overall well  Did not required any p r n  Medication  As per previous notes the patient has been seen to be very disorganized, delusional  Today patient was able to respond to questions clearly and did not verbalized any paranoia or delusional ideation today          ROS: no complaints, all other systems are negative    Mental Status Evaluation:    Appearance:  casually dressed   Behavior:  normal   Speech:  normal rate, normal volume, normal pitch   Mood:  euthymic   Affect:  blunted   Thought Process:  linear   Associations: intact associations   Thought Content:  some paranoia   Perceptual Disturbances: denies auditory or visual hallucinations when asked   Risk Potential: Suicidal ideation - None  Homicidal ideation - None  Potential for aggression - No   Sensorium:  oriented to person, place and time/date   Memory:  recent and remote memory grossly intact   Consciousness:  alert and awake   Attention: attention span and concentration are age appropriate   Insight:  limited   Judgment: limited   Gait/Station: in bed   Motor Activity: no abnormal movements     Vital signs in last 24 hours:    Temp:  [97 6 °F (36 4 °C)-98 °F (36 7 °C)] 97 6 °F (36 4 °C)  HR:  [] 110  Resp:  [16] 16  BP: (132-148)/(80-88) 132/80    Laboratory results:   I have personally reviewed all pertinent laboratory/tests results  Most Recent Labs:   Lab Results   Component Value Date    WBC 9 30 12/19/2019    RBC 4 59 12/19/2019    HGB 13 6 12/19/2019    HCT 39 7 12/19/2019     12/19/2019    RDW 14 4 12/19/2019    NEUTROABS 6 30 12/19/2019    SODIUM 139 12/19/2019    K 3 6 12/19/2019     12/19/2019    CO2 26 12/19/2019    BUN 9 12/19/2019    CREATININE 0 79 12/19/2019    GLUC 115 (H) 12/19/2019    CALCIUM 9 1 12/19/2019    AST 15 12/19/2019    ALT 18 12/19/2019    ALKPHOS 64 12/19/2019    TP 7 2 12/19/2019    ALB 3 7 12/19/2019    TBILI 0 40 12/19/2019    CHOLESTEROL 175 12/19/2019    HDL 21 (L) 12/19/2019    TRIG 114 12/19/2019    LDLCALC 131 (H) 12/19/2019    [place] 154 12/19/2019    WRD2BDBKHRCM 1 390 12/19/2019    HGBA1C 6 4 (H) 12/19/2019     12/19/2019       Progress Toward Goals: improving gradually    Assessment/Plan  the patient presented logical and linear today  Though recent notes suggest patient has been very paranoid and delusional   Currently is on injectable [place]  Last dose given 3 days back  Will continue to monitor the patient for her mood behaviors safety sleep and response to medication  Principal Problem:    Schizoaffective disorder, chronic condition with acute exacerbation (HCC)  Active Problems:    Elevated BP without diagnosis of hypertension    Tobacco abuse    Medical clearance for psychiatric admission    Recommended Treatment:     Planned medication and treatment changes:     All current active medications have been reviewed  Encourage group therapy, milieu therapy and occupational therapy  Behavioral Health checks every 7 minutes  Continue current medications:    Current Facility-Administered Medications:  acetaminophen 650 mg Oral Q6H PRN Delfino Lacy MD   aluminum-magnesium hydroxide-simethicone 30 mL Oral Q4H PRN Delfino Lacy MD   benztropine 1 mg Intramuscular Q6H PRN Delfino Lacy MD   benztropine 1 mg Oral Q6H PRN Delfino Lacy MD   hydrOXYzine HCL 25 mg Oral Q4H PRN Vanessa Machado MD   LORazepam 2 mg Intramuscular Q4H PRN Vanessa Machado MD   LORazepam 1 mg Oral Q4H PRN Vanessa Machado MD   magnesium hydroxide 30 mL Oral Daily PRN Vanessa Machado MD   nicotine polacrilex 2 mg Oral Q2H PRN Annika Live MD   OLANZapine 5 mg Intramuscular Q3H PRN Vanessa Machado MD   OLANZapine 5 mg Oral Q3H PRN Vanessa Machado MD   paliperidone palmitate  mg Intramuscular Q30 Days RALPH Quintanilla   traZODone 50 mg Oral HS PRN Vanessa Machado MD       Risks / Benefits of Treatment:    Risks, benefits, and possible side effects of medications explained to patient and patient verbalizes understanding and agreement for treatment  Counseling / Coordination of Care:    Patient's progress discussed with staff in treatment team meeting  Medications, treatment progress and treatment plan reviewed with patient      Jose Daniel Arguello MD 12/21/19

## 2019-12-22 PROCEDURE — 99232 SBSQ HOSP IP/OBS MODERATE 35: CPT | Performed by: PSYCHIATRY & NEUROLOGY

## 2019-12-22 NOTE — PROGRESS NOTES
Progress Note - [address] Simon Quijano 38 y o  female MRN: [id number]   Unit/Bed#: Eula You 374-02 Encounter: [id number]      Subjective: The patient's chart reviewed and case discussed with the nurse  The patient was seen in her room along with the nurse  The patient was comfortably lying in the bed but awake  She presented very tangential in her thought process when asked open-ended question  Also seems to be disorganized at times  When asked specific question, the patient was able to respond appropriately  She reports her mood has been okay  She reports she slept good last night but had occasional weird dreams  Denies feeling depressed or anxious today  Denies any auditory or visual hallucination  The patient was concerned about housing post discharge and was advised to talk with  tomorrow morning  She denied any other concern  Denies any SI or HI  As per the nurse the patient has been mostly isolating herself in her room  Though at times attends groups    No concerns reported        ROS: no complaints, all other systems are negative    Mental Status Evaluation:    Appearance:  age appropriate, casually dressed   Behavior:  pleasant   Speech:  normal rate, normal volume, normal pitch   Mood:  euthymic   Affect:  brighter   Thought Process:  disorganized, tangential   Associations: loose associations   Thought Content:  no overt delusions   Perceptual Disturbances: denies auditory or visual hallucinations when asked   Risk Potential: Suicidal ideation - None  Homicidal ideation - None  Potential for aggression - No   Sensorium:  oriented to person, place and time/date   Memory:  recent and remote memory grossly intact   Consciousness:  alert and awake   Attention: attention span and concentration appear shorter than expected for age   Insight:  limited   Judgment: limited   Gait/Station: normal gait/station   Motor Activity: no abnormal movements     Vital signs in last 24 hours: Temp:  [97 °F (36 1 °C)] 97 °F (36 1 °C)  HR:  [78-99] 99  Resp:  [16] 16  BP: (140-154)/(78-91) 154/78    Laboratory results:   I have personally reviewed all pertinent laboratory/tests results  Most Recent Labs:   Lab Results   Component Value Date    WBC 9 30 12/19/2019    RBC 4 59 12/19/2019    HGB 13 6 12/19/2019    HCT 39 7 12/19/2019     12/19/2019    RDW 14 4 12/19/2019    NEUTROABS 6 30 12/19/2019    SODIUM 139 12/19/2019    K 3 6 12/19/2019     12/19/2019    CO2 26 12/19/2019    BUN 9 12/19/2019    CREATININE 0 79 12/19/2019    GLUC 115 (H) 12/19/2019    CALCIUM 9 1 12/19/2019    AST 15 12/19/2019    ALT 18 12/19/2019    ALKPHOS 64 12/19/2019    TP 7 2 12/19/2019    ALB 3 7 12/19/2019    TBILI 0 40 12/19/2019    CHOLESTEROL 175 12/19/2019    HDL 21 (L) 12/19/2019    TRIG 114 12/19/2019    LDLCALC 131 (H) 12/19/2019    [place] 154 12/19/2019    CWF2QBYQELUX 1 390 12/19/2019    HGBA1C 6 4 (H) 12/19/2019     12/19/2019       Progress Toward Goals: improving    Assessment/Plan  the patient seems to be doing better but still presents disorganized at times  Plan is to continue with her current medication with no changes  Will continue to monitor her for mood behavior, safety, sleep and response to meds  Principal Problem:    Schizoaffective disorder, chronic condition with acute exacerbation (HCC)  Active Problems:    Elevated BP without diagnosis of hypertension    Tobacco abuse    Medical clearance for psychiatric admission    Recommended Treatment:     Planned medication and treatment changes:     All current active medications have been reviewed  Encourage group therapy, milieu therapy and occupational therapy  Behavioral Health checks every 7 minutes  Continue current medications:    Current Facility-Administered Medications:  acetaminophen 650 mg Oral Q6H PRN Emmie Melgar MD   aluminum-magnesium hydroxide-simethicone 30 mL Oral Q4H PRN Emmie Melgar MD   benztropine 1 mg Intramuscular Q6H PRN Marge Smith MD   benztropine 1 mg Oral Q6H PRN Marge Smith MD   hydrOXYzine HCL 25 mg Oral Q4H PRN Marge Smith MD   LORazepam 2 mg Intramuscular Q4H PRN Marge Smith MD   LORazepam 1 mg Oral Q4H PRN Marge Smith MD   magnesium hydroxide 30 mL Oral Daily PRN Marge Smith MD   nicotine polacrilex 2 mg Oral Q2H PRN Lizabeth Wallis MD   OLANZapine 5 mg Intramuscular Q3H PRN Marge Smith MD   OLANZapine 5 mg Oral Q3H PRN Marge Smith MD   paliperidone palmitate  mg Intramuscular Q30 Days RALPH Hancock   traZODone 50 mg Oral HS PRN Marge Smith MD       Risks / Benefits of Treatment:    Risks, benefits, and possible side effects of medications explained to patient and patient verbalizes understanding and agreement for treatment  Counseling / Coordination of Care:    Patient's progress discussed with staff in treatment team meeting  Medications, treatment progress and treatment plan reviewed with patient      Maria C Mack MD 12/22/19

## 2019-12-22 NOTE — PROGRESS NOTES
Patient in her room reading and out on unit for meals  Patient is quiet, calm, cooperative, and medication compliant  Patient is has bright affect, appetite is good, and she is medication compliant

## 2019-12-22 NOTE — NURSING NOTE
Patient visible on unit throughout the evening  Pleasant and cooperative with unit routine  Upon approach, patient smiled and made good eye contact  No disorganized thinking noted  Patient joined group activities  No scheduled HS medications and no PRNs needed  Patient retreated to her room for sleep without issue  Will continue to monitor

## 2019-12-22 NOTE — PROGRESS NOTES
12/22/19 1000   Team Meeting   Meeting Type Daily Rounds   Initial Conference Date 12/22/19   Team Members Present   Team Members Present Physician;Nurse   Physician Team Member Dr Chico Castorena Team Member Gabrielle Dee   Patient/Family Present   Patient Present No   Patient's Family Present No   Medical, labs and medications reviewed by team  Discharge to be determined by attending  Medication changes to be assessed

## 2019-12-23 PROCEDURE — 99232 SBSQ HOSP IP/OBS MODERATE 35: CPT | Performed by: NURSE PRACTITIONER

## 2019-12-23 RX ORDER — FLUTICASONE PROPIONATE 50 MCG
1 SPRAY, SUSPENSION (ML) NASAL DAILY
Status: DISCONTINUED | OUTPATIENT
Start: 2019-12-23 | End: 2019-12-31 | Stop reason: HOSPADM

## 2019-12-23 RX ORDER — PALIPERIDONE 3 MG/1
3 TABLET, EXTENDED RELEASE ORAL DAILY
Status: DISCONTINUED | OUTPATIENT
Start: 2019-12-23 | End: 2019-12-24

## 2019-12-23 RX ORDER — ECHINACEA PURPUREA EXTRACT 125 MG
1 TABLET ORAL
Status: DISCONTINUED | OUTPATIENT
Start: 2019-12-23 | End: 2019-12-31 | Stop reason: HOSPADM

## 2019-12-23 RX ORDER — LORAZEPAM 2 MG/ML
2 INJECTION INTRAMUSCULAR EVERY 4 HOURS PRN
Status: DISCONTINUED | OUTPATIENT
Start: 2019-12-23 | End: 2019-12-31 | Stop reason: HOSPADM

## 2019-12-23 RX ADMIN — SALINE NASAL SPRAY 1 SPRAY: 1.5 SOLUTION NASAL at 21:15

## 2019-12-23 RX ADMIN — CARBAMIDE PEROXIDE 6.5% 5 DROP: 6.5 LIQUID AURICULAR (OTIC) at 21:15

## 2019-12-23 RX ADMIN — PALIPERIDONE 3 MG: 3 TABLET, FILM COATED, EXTENDED RELEASE ORAL at 11:54

## 2019-12-23 NOTE — NURSING NOTE
Patient visible on unit at start of shift  Pleasant and cooperative with unit routine  Smiles and makes eye contact  Speech soft  Patient retreated to her room after snack to read  No issues noted  Will monitor

## 2019-12-23 NOTE — PROGRESS NOTES
Progress Note - 14 Jennifer Quijano 38 y o  female MRN: [id number]  Unit/Bed#: Rohit Bello 792-13 Encounter: [id number]    The patient was seen for continuing care and reviewed with treatment team  Staff reports that the patient remains calm, cooperative, and compliant with medication  Attending groups  Minimally social with peers  During assessment the patient is improving in linear conversation, reporting more organized thoughts  However, patient remains tangential and expresses increased paranoia at length  She continues to report feeling "gang stalked by mexicans" and continues to remain focused on discharge  Patient reports ongoing issues of "trespassing with peers " Remain pleasant on approach and denies any current feelings of depression or anxiety  Patient reports safety while admitted at the hospital  Denies any auditory or visual hallucinations  Denies any thoughts to hurt herself or others  Will order Invega 3 mg po daily for symptom management  Mental Status Evaluation:  Appearance:  Adequate hygiene and grooming   Behavior:  cooperative and friendly   Fund of knowledge  Diminished   Speech:   Language: rambling   No overt abnormality   Mood:  improving   Affect:   Associations: appropriate  Loosely connected   Thought Process:   Tangential and disorganized; but improving   Thought Content:  Paranoid and mistrustful and Delusions of persecution   Perceptual Disturbances: Denies hallucinations and does not appear to be responding to internal stimuli   Risk Potential: No suicidal or homicidal ideation   Orientation  Oriented x 3   Memory Not tested   Attention/Concentration attention span appeared shorter than expected for age   Insight:  limited   Judgment: Limited   Gait/Station: normal gait/station and normal balance   Motor Activity: No abnormal movement noted     Progress Toward Goals: slightly improving    Assessment/Plan    Principal Problem:    Schizoaffective disorder, chronic condition with acute exacerbation ([facility] [address] )  Active Problems:    Elevated BP without diagnosis of hypertension    Tobacco abuse    Medical clearance for psychiatric admission      Recommended Treatment: Continue with pharmacotherapy, group therapy, milieu therapy and occupational therapy  The patient will be maintained on the following medications:    Current Facility-Administered Medications:  acetaminophen 650 mg Oral Q6H PRN Yee Neville MD   aluminum-magnesium hydroxide-simethicone 30 mL Oral Q4H PRN Yee Neville MD   benztropine 1 mg Intramuscular Q6H PRN Yee Neville MD   benztropine 1 mg Oral Q6H PRN Yee Neville MD   hydrOXYzine HCL 25 mg Oral Q4H PRN Yee Neville MD   LORazepam 2 mg Intramuscular Q4H PRN Yee Neville MD   LORazepam 1 mg Oral Q4H PRN Yee Neville MD   magnesium hydroxide 30 mL Oral Daily PRN Yee Neville MD   nicotine polacrilex 2 mg Oral Q2H PRN Tawnya Buck MD   OLANZapine 5 mg Intramuscular Q3H PRN Yee Neville MD   OLANZapine 5 mg Oral Q3H PRN Yee Neville MD   paliperidone palmitate  mg Intramuscular Q30 Days Gabby RALPH Portillo   traZODone 50 mg Oral HS PRN Yee Neville MD       Risks, benefits and possible side effects of Medications:   Risks, benefits, and possible side effects of medications explained to patient and patient verbalizes understanding

## 2019-12-23 NOTE — PROGRESS NOTES
12/23/19 1400   Activity/Group Checklist   Group   (Relapse Prevention )   Attendance Did not attend   Attendance Duration (min) 46-60   Affect/Mood NIMESH

## 2019-12-23 NOTE — PROGRESS NOTES
12/23/19 0831   Team Meeting   Meeting Type Daily Rounds   Initial Conference Date 12/23/19   Team Members Present   Team Members Present Physician;Nurse;   Physician Team Member Dr Cinthia Faulkner Team Member [address] [facility] [address],[facility] Management Team Member Carl Arreola   Patient/Family Present   Patient Present No   Patient's Family Present No   Meds to be monitored  Remains delusional   Discharge to be determined

## 2019-12-23 NOTE — PROGRESS NOTES
[facility] NOTE     12/23/19 1000   Activity/Group Checklist   Group Admission/Discharge  (Completed Relapse Prevention Plan, appropriate responses)   Attendance Attended   Attendance Duration (min) 0-15   Interactions Interacted appropriately   Affect/Mood Appropriate

## 2019-12-23 NOTE — PROGRESS NOTES
Patient pleasant upon approach  Enjoys reading in her room  Patient denies anxiety, depression, S/HI,A/VH and pain  Med and meal compliant  Isolative and withdrawn to self  Will continue to monitor and provide therapeutic support

## 2019-12-24 PROCEDURE — 99232 SBSQ HOSP IP/OBS MODERATE 35: CPT | Performed by: NURSE PRACTITIONER

## 2019-12-24 RX ORDER — PALIPERIDONE 6 MG/1
6 TABLET, EXTENDED RELEASE ORAL DAILY
Status: DISCONTINUED | OUTPATIENT
Start: 2019-12-25 | End: 2019-12-31 | Stop reason: HOSPADM

## 2019-12-24 RX ADMIN — FLUTICASONE PROPIONATE 1 SPRAY: 50 SPRAY, METERED NASAL at 09:18

## 2019-12-24 RX ADMIN — CARBAMIDE PEROXIDE 6.5% 5 DROP: 6.5 LIQUID AURICULAR (OTIC) at 21:51

## 2019-12-24 RX ADMIN — CARBAMIDE PEROXIDE 6.5% 5 DROP: 6.5 LIQUID AURICULAR (OTIC) at 09:18

## 2019-12-24 RX ADMIN — PALIPERIDONE 3 MG: 3 TABLET, FILM COATED, EXTENDED RELEASE ORAL at 09:18

## 2019-12-24 RX ADMIN — SALINE NASAL SPRAY 1 SPRAY: 1.5 SOLUTION NASAL at 21:52

## 2019-12-24 NOTE — PROGRESS NOTES
Pt has been pleasant and cooperative, visible but not social  Pt is med/meal compliant  Pt was given nasal spray and ear drops at 2115  Pt denies any anxiety, depression, SI, HI, AH, or VH  Will continue to monitor

## 2019-12-24 NOTE — PROGRESS NOTES
12/24/19 0835   Team Meeting   Meeting Type Daily Rounds   Initial Conference Date 12/24/19   Team Members Present   Team Members Present Physician;Nurse   Physician Team Member Dr Susie Kwon Team Member Aury Dejesus Management Team Member Suzan Ruby   Patient/Family Present   Patient Present No   Patient's Family Present No   Pt to receive Invega  Pt still with paranoid delusions

## 2019-12-24 NOTE — PROGRESS NOTES
Pt pleasant and cooperative  Pt reported the flonase and ear drops are already helping ease congestion symptoms  Pt social with peers on unit  Currently denying SI's, HI's, AH's, VH's  Will continue to monitor

## 2019-12-24 NOTE — PLAN OF CARE
Problem: SELF HARM/SUICIDALITY  Goal: Will have no self-injury during hospital stay  Description  INTERVENTIONS:  - Q 15 MINUTES: Routine safety checks  - Q WAKING SHIFT & PRN: Assess risk to determine if routine checks are adequate to maintain patient safety  - Encourage patient to participate actively in care by formulating a plan to combat response to suicidal ideation, identify supports and resources  Outcome: Progressing     Problem: PSYCHOSIS  Goal: Will report no hallucinations or delusions  Description  Interventions:  - Administer medication as  ordered  - Every waking shifts and PRN assess for the presence of hallucinations and or delusions  - Assist with reality testing to support increasing orientation  - Assess if patient's hallucinations or delusions are encouraging self-harm or harm to others and intervene as appropriate  Outcome: Progressing     Problem: ANXIETY  Goal: Will report anxiety at manageable levels  Description  INTERVENTIONS:  - Administer medication as ordered  - Teach and encourage coping skills  - Provide emotional support  - Assess patient/family for anxiety and ability to cope  Outcome: Progressing  Goal: By discharge: Patient will verbalize 2 strategies to deal with anxiety  Description  Interventions:  - Identify any obvious source/trigger to anxiety  - Staff will assist patient in applying identified coping technique/skills  - Encourage attendance of scheduled groups and activities  Outcome: Progressing

## 2019-12-24 NOTE — PROGRESS NOTES
12/24/19 1000   Activity/Group Checklist   Group Other (Comment)  (Art Therapy Group/Open Choice, Process Discussion)   Attendance Attended   Attendance Duration (min) Greater than 60   Interactions Interacted appropriately  (Remains focused on paranormal)   Affect/Mood Appropriate   Goals Achieved Able to listen to others; Able to engage in interactions; Able to recieve feedback; Able to give feedback to another  (Able to engage materials; full participation)

## 2019-12-24 NOTE — PROGRESS NOTES
Pt denies all symptoms  Pt is calm, cooperative and pleasant  Pt is occasionally visible in the milieu  Pt has had no complaints at this time  Will monitor

## 2019-12-24 NOTE — PROGRESS NOTES
Progress Note - [address] [facility] Josep Quijano 38 y o  female MRN: [id number]  Unit/Bed#: Amna Elizalde 004-00 Encounter: [id number]    The patient was seen for continuing care and reviewed with treatment team     Mental Status Evaluation:  Appearance:  Adequate hygiene and grooming   Behavior:  argumentative and hostile   Mood:  angry, irritable, anxious and improving   Affect: mood-congruent   Speech: Normal volume and Pressured   Thought Process:  disorganized   Thought Content:  Paranoid and mistrustful and Delusions of persecution   Perceptual Disturbances: Denies hallucinations and does not appear to be responding to internal stimuli   Risk Potential: No suicidal or homicidal ideation   Orientation:   AOx3; No aware of situation/cause of hospitalization     Progress Toward Goals:      No significant change in patient condition today  Patient remains overtly delusional stating provider is another species" patient cites shape of skull and ears as reason she can tell this  Perseverative about psychics and gang stalking when prompted  Volume and strength of delusional thinking seems related to patient level of distress  Mood is labile and easily influenced by environment and social interactions  Denies other psychotic symptoms such as voices or visual hallucinations  Denies SI/HI  Denies medication side effects  Increase oral Invega to 6 mg p o  Daily  Recommended Treatment:  Increase oral Invega to 6 mg p o  Daily  Continue with pharmacotherapy, group therapy, milieu therapy and occupational therapy    The patient will be maintained on the following medications:    Current Facility-Administered Medications:  acetaminophen 650 mg Oral Q6H PRN Delfino Lacy MD   aluminum-magnesium hydroxide-simethicone 30 mL Oral Q4H PRN Delfino Lacy MD   benztropine 1 mg Intramuscular Q6H PRN Delfino Lacy MD   benztropine 1 mg Oral Q6H PRN Delfino Lacy MD   carbamide peroxide 5 drop Left Ear BID Korina Hernandez MD fluticasone 1 spray Each Nare Daily Shirley Jimenez MD   hydrOXYzine HCL 25 mg Oral Q4H PRN Vijaya Rangel MD   LORazepam 2 mg Intramuscular Q4H PRN Vijaya Rangel MD   LORazepam 1 mg Oral Q4H PRN Vijaya Rangel MD   magnesium hydroxide 30 mL Oral Daily PRN Vijaya Rangel MD   nicotine polacrilex 2 mg Oral Q2H PRN Jamee Cardozo MD   OLANZapine 5 mg Intramuscular Q3H PRN Vijaya Rangel MD   OLANZapine 5 mg Oral Q3H PRN Vijaya Rangel MD   paliperidone 3 mg Oral Daily Viral Prow, CRNP   paliperidone palmitate  mg Intramuscular Q30 Days Viral Prow, CRNP   sodium chloride 1 spray Each Nare Q1H PRN Shirley Jimenez MD   traZODone 50 mg Oral HS PRN Vijaya Rangel MD       Schizoaffective disorder, chronic condition with acute exacerbation ([facility] [address] )

## 2019-12-24 NOTE — PROGRESS NOTES
12/24/19 1300   Activity/Group Checklist   Attendance Attended   Attendance Duration (min) 16-30   Interactions Other (Comment)  (patient verbally and postured to threaten another patient)   Affect/Mood Angry   Goals Achieved   (unable to [facility] AIDEN herself)   Patient came into group angry with the nurse pracitioner stating he called her a liar  She presented paranoid and continue to talk racist implying everyone in the room who was white were not her people and we have enslaved her and hers  She became angry slammed the table and verbally threatened and postured to another peer who asked her who her people are

## 2019-12-24 NOTE — PLAN OF CARE
Problem: SELF HARM/SUICIDALITY  Goal: Will have no self-injury during hospital stay  Description  INTERVENTIONS:  - Q 15 MINUTES: Routine safety checks  - Q WAKING SHIFT & PRN: Assess risk to determine if routine checks are adequate to maintain patient safety  - Encourage patient to participate actively in care by formulating a plan to combat response to suicidal ideation, identify supports and resources  Outcome: Progressing     Problem: PSYCHOSIS  Goal: Will report no hallucinations or delusions  Description  Interventions:  - Administer medication as  ordered  - Every waking shifts and PRN assess for the presence of hallucinations and or delusions  - Assist with reality testing to support increasing orientation  - Assess if patient's hallucinations or delusions are encouraging self-harm or harm to others and intervene as appropriate  Outcome: Progressing     Problem: ANXIETY  Goal: Will report anxiety at manageable levels  Description  INTERVENTIONS:  - Administer medication as ordered  - Teach and encourage coping skills  - Provide emotional support  - Assess patient/family for anxiety and ability to cope  Outcome: Progressing  Goal: By discharge: Patient will verbalize 2 strategies to deal with anxiety  Description  Interventions:  - Identify any obvious source/trigger to anxiety  - Staff will assist patient in applying identified coping technique/skills  - Encourage attendance of scheduled groups and activities  Outcome: Progressing     Problem: SLEEP DISTURBANCE  Goal: Will exhibit normal sleeping pattern  Description  Interventions:  -  Assess the patients sleep pattern, noting recent changes  - Administer medication as ordered  - Decrease environmental stimuli, including noise, as appropriate during the night  - Encourage the patient to actively participate in unit groups and or exercise during the day to enhance ability to achieve adequate sleep at night  - Assess the patient, in the morning, encouraging a description of sleep experience  Outcome: Progressing

## 2019-12-25 PROCEDURE — 99232 SBSQ HOSP IP/OBS MODERATE 35: CPT | Performed by: STUDENT IN AN ORGANIZED HEALTH CARE EDUCATION/TRAINING PROGRAM

## 2019-12-25 RX ADMIN — CARBAMIDE PEROXIDE 6.5% 5 DROP: 6.5 LIQUID AURICULAR (OTIC) at 08:24

## 2019-12-25 RX ADMIN — FLUTICASONE PROPIONATE 1 SPRAY: 50 SPRAY, METERED NASAL at 08:24

## 2019-12-25 RX ADMIN — PALIPERIDONE 6 MG: 6 TABLET, FILM COATED, EXTENDED RELEASE ORAL at 08:48

## 2019-12-25 RX ADMIN — SALINE NASAL SPRAY 1 SPRAY: 1.5 SOLUTION NASAL at 08:24

## 2019-12-25 RX ADMIN — CARBAMIDE PEROXIDE 6.5% 5 DROP: 6.5 LIQUID AURICULAR (OTIC) at 21:08

## 2019-12-25 RX ADMIN — SALINE NASAL SPRAY 1 SPRAY: 1.5 SOLUTION NASAL at 21:07

## 2019-12-25 NOTE — PROGRESS NOTES
Patient isolative and withdrawn this morning upon approach  Patient denies anxiety, depression, S/HI,A/VH and pain  Med and meal compliant  Reports "feeling tired" Will continue to monitor and provide therapeutic support

## 2019-12-25 NOTE — PROGRESS NOTES
Progress Note - [address] 38 y o  female MRN: [id number]  Unit/Bed#: Hilario Zavala 916-95 Encounter: [id number]    Subjective:    Per nursing, patient was calm, cooperative and pleasant yesterday evening, isolative and withdrawn this morning  Per patient, patient denying any problems or concerns  She is focused on going to the [employer] following discharge, hoping the  will help her with that goal   She describes her mood as "good "  She reports sleeping well but having "weird dreams "  She reports her appetite is okay  She denies any SI/HI  She reports handling conflict well recently  She denies any AH/VH  She reports that the staff are treating her well but felt that provider told her she "was a liar "  She talks about a heavy sensation in her chest at times and describes it as "the devil pressing on her chest," acknowledges it may just be reflux  Behavior over the last 24 hours:  unchanged  Medication side effects: No  ROS: Indigestion, pain in chest    Objective:    Temp:  [97 5 °F (36 4 °C)-98 1 °F (36 7 °C)] 97 5 °F (36 4 °C)  HR:  [104-110] 110  Resp:  [16] 16  BP: (117-119)/(75-79) 117/79    Mental Status Evaluation:  Appearance:  sitting comfortably in chair, dressed in casual clothing, adequate hygiene and grooming, cooperative with interview   Behavior:  No tics, tremors, or behaviors observed   Speech:  Normal rate, rhythm, and volume, a bit verbose   Mood:  "good"   Affect:  Appears blunted   Thought Process: Tangential   Associations tangential associations   Thought Content:  No passive or active suicidal or homicidal ideation, intent, or plan    Continues to have paranoid ideation   Perceptual Disturbances: Denies any auditory or visual hallucinations   Sensorium:  Oriented to person, place, time, and situation   Memory:  recent and remote memory grossly intact   Consciousness:  alert and awake   Attention: attention span and concentration were age appropriate   Insight:  Limited   Judgment: Limited   Gait/Station: normal gait/station   Motor Activity: no abnormal movements       Progress Toward Goals: Progressing    Recommended Treatment: Continue with group therapy, milieu therapy and occupational therapy  Risks, benefits and possible side effects of Medications:   Risks, benefits, and possible side effects of medications explained to patient and patient verbalizes understanding  Medications: all current active meds have been reviewed      Current Facility-Administered Medications:  acetaminophen 650 mg Oral Q6H PRN Eliezer Aviles MD   aluminum-magnesium hydroxide-simethicone 30 mL Oral Q4H PRN Eliezer Aviles MD   benztropine 1 mg Intramuscular Q6H PRN Eliezer Aviles MD   benztropine 1 mg Oral Q6H PRN Eliezer Aviles MD   carbamide peroxide 5 drop Left Ear BID Eva Fuller MD   fluticasone 1 spray Each Nare Daily Eva Fuller MD   hydrOXYzine HCL 25 mg Oral Q4H PRN Eliezer Aviles MD   LORazepam 2 mg Intramuscular Q4H PRN Eliezer Aviles MD   LORazepam 1 mg Oral Q4H PRN Eliezer Aviles MD   magnesium hydroxide 30 mL Oral Daily PRN Eliezer Aviles MD   nicotine polacrilex 2 mg Oral Q2H PRN Carole Neal MD   OLANZapine 5 mg Intramuscular Q3H PRN Eliezer Aviles MD   OLANZapine 5 mg Oral Q3H PRN Eliezer Aviles MD   paliperidone 6 mg Oral Daily Ese Lites, CRNP   paliperidone palmitate  mg Intramuscular Q30 Days Ese Lites, CRNP   sodium chloride 1 spray Each Nare Q1H PRN Eva Fuller MD   traZODone 50 mg Oral HS PRN Eliezer Aviles MD       Assessment/Plan   Principal Problem:    Schizoaffective disorder, chronic condition with acute exacerbation ([facility] [address] )  Active Problems:    Elevated BP without diagnosis of hypertension    Tobacco abuse    Medical clearance for psychiatric admission    37 y/o Female with schizoaffective disorder- continues to have some paranoid thoughts, a bit tangential but in fair behavioral control on unit, improving mood stability  Plan:  -Continue current med regimen

## 2019-12-25 NOTE — PROGRESS NOTES
12/25/19 0900   Team Meeting   Meeting Type Daily Rounds   Initial Conference Date 12/25/19   Team Members Present   Team Members Present Physician;Nurse   Physician Team Member Dr Ariadna Kaiser Team Member Ana Carlin   Patient/Family Present   Patient Present No   Patient's Family Present No   Medical, labs and medications reviewed by team  Discharge to be determined by attending  Medication changes to be assessed

## 2019-12-26 PROCEDURE — 99232 SBSQ HOSP IP/OBS MODERATE 35: CPT | Performed by: NURSE PRACTITIONER

## 2019-12-26 RX ADMIN — CARBAMIDE PEROXIDE 6.5% 5 DROP: 6.5 LIQUID AURICULAR (OTIC) at 08:21

## 2019-12-26 RX ADMIN — FLUTICASONE PROPIONATE 1 SPRAY: 50 SPRAY, METERED NASAL at 08:21

## 2019-12-26 RX ADMIN — PALIPERIDONE 6 MG: 6 TABLET, FILM COATED, EXTENDED RELEASE ORAL at 08:21

## 2019-12-26 NOTE — PROGRESS NOTES
Progress Note - 14 Jennifer Quijano 38 y o  female MRN: [id number]  Unit/Bed#: [facility] 335-07 Encounter: [id number]    The patient was seen for continuing care and reviewed with treatment team  Staff reports that the patient remains calm, cooperative, and compliant with medications  Attending groups and social with peers  During assessment the patient remains delusional, tangential, and disorganized in conversation  Appears to be slightly improving in linear conversation, but continues to remain preoccupied with "her species and race " Patient remains paranoid and suspicious of others and their intentions, however pleasant on approach  Continues to remain preoccupied with persecutory delusions but is easily redirected  Mood remains labile and is easily influenced to environment and social interactions  Will continue to monitor symptoms and behavior   No current medication changes    Mental Status Evaluation:  Appearance:  Adequate hygiene and grooming   Behavior:  cooperative   Fund of knowledge  Diminished   Speech:   Language: rambling  No overt abnormality   Mood:  pleasant   Affect:   Associations: labile  Loosely connected   Thought Process:  Circumstantial, Tangential and disorganized   Thought Content:  Paranoid and mistrustful, Delusions of persecution and Obsessive ruminations   Perceptual Disturbances: Denies hallucinations and does not appear to be responding to internal stimuli   Risk Potential: No suicidal or homicidal ideation   Orientation  Oriented to place and person   Memory Not tested   Attention/Concentration attention span appeared shorter than expected for age   Insight:  limited   Judgment: Limited   Gait/Station: normal gait/station and normal balance   Motor Activity: No abnormal movement noted     Progress Toward Goals: slightly improved    Assessment/Plan    Principal Problem:    Schizoaffective disorder, chronic condition with acute exacerbation ([facility] [address] )  Active Problems: Elevated BP without diagnosis of hypertension    Tobacco abuse    Medical clearance for psychiatric admission      Recommended Treatment: Continue with pharmacotherapy, group therapy, milieu therapy and occupational therapy  The patient will be maintained on the following medications:    Current Facility-Administered Medications:  acetaminophen 650 mg Oral Q6H PRN Joe Reinoso MD   aluminum-magnesium hydroxide-simethicone 30 mL Oral Q4H PRN Joe Reinoso, MD   benztropine 1 mg Intramuscular Q6H PRN Joe Reinoso, MD   benztropine 1 mg Oral Q6H PRN Joe Reinoso, MD   carbamide peroxide 5 drop Left Ear BID Fay Choi MD   fluticasone 1 spray Each Nare Daily Fay Choi MD   hydrOXYzine HCL 25 mg Oral Q4H PRN Joe Reinoso MD   LORazepam 2 mg Intramuscular Q4H PRN Joe Reinoso, MD   LORazepam 1 mg Oral Q4H PRN Joe Reinoso, MD   magnesium hydroxide 30 mL Oral Daily PRN Joe Reinoso MD   nicotine polacrilex 2 mg Oral Q2H PRN Myla Finn MD   OLANZapine 5 mg Intramuscular Q3H PRN Joe Reinoso MD   OLANZapine 5 mg Oral Q3H PRN Joe Reinoso MD   paliperidone 6 mg Oral Daily RALPH Walters   paliperidone palmitate  mg Intramuscular Q30 Days RALPH Walters   sodium chloride 1 spray Each Nare Q1H PRN Fay Choi MD   traZODone 50 mg Oral HS PRN Joe Reinoso MD       Risks, benefits and possible side effects of Medications:   Risks, benefits, and possible side effects of medications explained to patient and patient verbalizes understanding

## 2019-12-26 NOTE — PLAN OF CARE
Worker spoke with pt  Pt discussing that she would like to go [facility] after discharge  Worker explained that she can enter the [facility] and day program at any time, but there will be a wait list for the shelter program  Pt verbalized understanding  Worker explained she can [employer] and put pt on the list for the shelter program  Pt in agreement  Worker educated pt on the shelter program  Pt in agreement  Pt signed SHELIA for [facility]  Pt requesting worker to contact her mother  Pt signed SHELIA

## 2019-12-26 NOTE — PROGRESS NOTES
12/26/19 0843   Team Meeting   Meeting Type Daily Rounds   Initial Conference Date 12/26/19   Team Members Present   Team Members Present Physician;Nurse;   Physician Team Member Dr Bud Ponce Team Member [address],[facility] Management Team Member Finley Reeve, Amelie Claude   Patient/Family Present   Patient Present No   Patient's Family Present No   Meds to be monitored, Charleen Romero IM to be discussed  Visible and social with her peers  Discharge to be determined

## 2019-12-26 NOTE — PROGRESS NOTES
12/26/19 1000   Activity/Group Checklist   Group Other (Comment)  (Art Therapy Group/Internal vs External, Process Discussion)   Attendance Attended   Attendance Duration (min) Greater than 60   Interactions Interacted appropriately   Affect/Mood Appropriate   Goals Achieved Able to listen to others; Able to engage in interactions; Able to recieve feedback; Able to give feedback to another  (Able to engage materials; full participation/avoids insight)

## 2019-12-26 NOTE — PROGRESS NOTES
12/26/19 1300   Activity/Group Checklist   Group   (recovery group)   Attendance Attended   Attendance Duration (min) 31-45   Interactions Interacted appropriately   Affect/Mood Appropriate   Goals Achieved Able to self-disclose; Able to recieve feedback; Able to listen to others; Able to engage in interactions   Discussed resources in the community  Patient is looking for housing

## 2019-12-26 NOTE — PROGRESS NOTES
Paitent is withdrawn and isolative to room  Med and meal compliant  Patient is pleasant upon approach  No complaints of SI, HI, audio or visual hallucinations, anxiety or depression  Will continue to monitor for changes in current status

## 2019-12-26 NOTE — PROGRESS NOTES
Patient pleasant upon approach  Patient affect appropriate  Patient is more visible and social this morning  Denies anxiety, depression, S/HI,A/VH and pain  Med and meal compliant  Will continue to monitor and provide therapeutic support

## 2019-12-26 NOTE — PROGRESS NOTES
Upon receipt at 2300, patient was lying down in her room  Patient got up early in shift to sit in conference room to read for a period of time, then retreated back to bed and sleep for rest of shift  Will continue to monitor

## 2019-12-27 PROCEDURE — 99232 SBSQ HOSP IP/OBS MODERATE 35: CPT | Performed by: NURSE PRACTITIONER

## 2019-12-27 RX ADMIN — CARBAMIDE PEROXIDE 6.5% 5 DROP: 6.5 LIQUID AURICULAR (OTIC) at 21:13

## 2019-12-27 RX ADMIN — SALINE NASAL SPRAY 1 SPRAY: 1.5 SOLUTION NASAL at 21:14

## 2019-12-27 RX ADMIN — PALIPERIDONE 6 MG: 6 TABLET, FILM COATED, EXTENDED RELEASE ORAL at 08:13

## 2019-12-27 RX ADMIN — FLUTICASONE PROPIONATE 1 SPRAY: 50 SPRAY, METERED NASAL at 08:13

## 2019-12-27 RX ADMIN — CARBAMIDE PEROXIDE 6.5% 5 DROP: 6.5 LIQUID AURICULAR (OTIC) at 08:15
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
## 2019-12-27 NOTE — PROGRESS NOTES
12/27/19 1000   Activity/Group Checklist   Group Other (Comment)  (Art Therapy Group/Reflection-Letting Go, Process Discussion)   Attendance Attended   Attendance Duration (min) Greater than 60   Interactions Interacted appropriately  (Remains somewhat guarded, but challenged self)   Affect/Mood Appropriate   Goals Achieved Able to listen to others; Able to engage in interactions; Able to recieve feedback; Able to give feedback to another  (Able to engage materials; full participation)     Patient continues to avoid direct suggestions, however, was able to exercise other challenges that had similar goals  Patient remains somewhat guarded and resistant to addressing stressors and personal investment in self

## 2019-12-27 NOTE — PROGRESS NOTES
[employer] NOTE     12/27/19 0954   Activity/Group Checklist   Group Personal control   Attendance Attended   Attendance Duration (min) 16-30   Interactions Interacted appropriately   Affect/Mood Calm   Goals Achieved Able to listen to others; Able to engage in interactions   Learning Objectives  Mind/Body Connection  Relaxation as part of daily Routine  Relaxation as coping strategy for emotional distress  Benefits of Proactive Relaxation  Modified Sridhar Chi Technique (movement and breath)

## 2019-12-27 NOTE — PROGRESS NOTES
Patient pleasant upon approach  Patient more visible and social on the unit  Denies anxiety, depression, S/HI,A/VH and pain  Med and meal compliant  Enjoys reading books in her room  Pleasant  Will continue to monitor and provide therapeutic support

## 2019-12-27 NOTE — PROGRESS NOTES
Progress Note - 14 Jennifer Quijano 38 y o  female MRN: [id number]  Unit/Bed#: Zahra Beltr[facility]-34 Encounter: [id number]    The patient was seen for continuing care and reviewed with treatment team  Staff reports that the patient remains calm, cooperative, and pleasant on approach  Continues to remain paranoid regarding staff and peers but appears to be decreasing  Continues to be visible and social on the unit  Attending groups  During assessment the patient is able to elaborate and communicate more effectively and for a longer period of time without becoming tangential  Patient continues to express delusional thoughts but is able to redirect herself during the conversation  Improved thought process is noted  Continues to express delusional thoughts about "gang stalking" and "skin heads that want to keep her sick" but appears to be improving insight in regards to thinking that these symptoms will improve  Patient does not like to be confronted on her reasoning ability or thought process  No current medication changes  Will continue to monitor symptom management  Mental Status Evaluation:  Appearance:  Adequate hygiene and grooming   Behavior:  cooperative, friendly and guarded   Fund of knowledge  Diminished   Speech:   Language: rambling  No overt abnormality   Mood:  improving; less fearful   Affect:   Associations: blunted  Loosely connected; but improving   Thought Process:   Tangential and disorganized; but improving   Thought Content:  Paranoid and mistrustful, Delusions of persecution, Grandiose delusions, Obsessive ruminations and Religiously preoccupied   Perceptual Disturbances: Denies hallucinations and does not appear to be responding to internal stimuli   Risk Potential: No suicidal or homicidal ideation   Orientation  Awake and alert   Memory Not tested   Attention/Concentration attention span appeared shorter than expected for age   Insight:  limited   Judgment: Limited   Gait/Station: normal gait/station and normal balance   Motor Activity: No abnormal movement noted     Progress Toward Goals: slightly improving    Assessment/Plan    Principal Problem:    Schizoaffective disorder, chronic condition with acute exacerbation (HCC)  Active Problems:    Elevated BP without diagnosis of hypertension    Tobacco abuse    Medical clearance for psychiatric admission      Recommended Treatment: Continue with pharmacotherapy, group therapy, milieu therapy and occupational therapy  The patient will be maintained on the following medications:    Current Facility-Administered Medications:  acetaminophen 650 mg Oral Q6H PRN Joe Reinoso MD   aluminum-magnesium hydroxide-simethicone 30 mL Oral Q4H PRN Joe Reinoso, MD   benztropine 1 mg Intramuscular Q6H PRN Joe Reinoso, MD   benztropine 1 mg Oral Q6H PRN Joe Reinoso, MD   carbamide peroxide 5 drop Left Ear BID Fay Choi MD   fluticasone 1 spray Each Nare Daily Fay Choi MD   hydrOXYzine HCL 25 mg Oral Q4H PRN Joe Reinoso, MD   LORazepam 2 mg Intramuscular Q4H PRN Joe Reinoso MD   LORazepam 1 mg Oral Q4H PRN Joe Reinoso MD   magnesium hydroxide 30 mL Oral Daily PRN Joe Reinoso MD   nicotine polacrilex 2 mg Oral Q2H PRN Myla Finn MD   OLANZapine 5 mg Intramuscular Q3H PRN Joe Reinoso MD   OLANZapine 5 mg Oral Q3H PRN Joe Reinoso MD   paliperidone 6 mg Oral Daily RALPH Walters   paliperidone palmitate  mg Intramuscular Q30 Days RALPH Walters   sodium chloride 1 spray Each Nare Q1H PRN Fay Choi MD   traZODone 50 mg Oral HS PRN Joe Reinoso MD       Risks, benefits and possible side effects of Medications:   Risks, benefits, and possible side effects of medications explained to patient and patient verbalizes understanding

## 2019-12-27 NOTE — PROGRESS NOTES
12/27/19 0838   Team Meeting   Meeting Type Daily Rounds   Initial Conference Date 12/27/19   Team Members Present   Team Members Present Physician;Nurse;   Physician Team Member Dr Ortega Rodas Team Member [facility] Management Team Member Baljit Riddle   Patient/Family Present   Patient Present No   Patient's Family Present No   Ludwig Smithua was increased on 12/26  Showing improvement, pending discharge for Monday

## 2019-12-28 PROCEDURE — 99232 SBSQ HOSP IP/OBS MODERATE 35: CPT | Performed by: NURSE PRACTITIONER

## 2019-12-28 RX ADMIN — CARBAMIDE PEROXIDE 6.5% 5 DROP: 6.5 LIQUID AURICULAR (OTIC) at 21:22

## 2019-12-28 RX ADMIN — PALIPERIDONE 6 MG: 6 TABLET, FILM COATED, EXTENDED RELEASE ORAL at 08:37

## 2019-12-28 NOTE — PROGRESS NOTES
Progress Note - [address] MARILY Quijano 38 y o  female MRN: [id number]   Unit/Bed#: [facility] 586-58 Encounter: [id number]    Halley De La Torre was seen today for continuation of care, records reviewed, patient was discussed with team   Patient has been calm, cooperative and social with select peers while in the dining room, she has been eating her meals and denies anxiety or depression, she has been denying suicidal homicidal ideation and she has been denying auditory visual hallucinations  Upon approach today Halley De La Torre is lying in bed with the blanket up over her head  She states that she is in bed like this because she is cold  She immediately uncovered her head and made eye contact with the provider, she was calm cooperative and pleasant  She states that she does not focus on feelings but more on thoughts so she does not feel depressed or anxious, if I would lay here and think about being homeless or help people treat me at times I may become depressed but that is not how I act or how I go about things    She states that she has been attending groups and feels that they are helpful  She feels that medications are working well she denies nausea vomiting diarrhea or constipation she continues to have some nasal congestion and ear discomfort though she does state that Flonase has been helping as well as Debrox drops have been helping for earwax  After speaking with her for an extended period of time she reported some delusional thoughts and began to blame college peers for her current situation and for not being successful in a career, "those 25year olds were jealous of me and they did want to see me be successful so they got their wish "  She did not verbalize any other delusional thoughts at this time and she was easily redirected      Sleep: normal  Appetite: normal  Medication side effects: No   ROS: reports nasal congestion and ear congestion, all other systems are negative    Mental Status Evaluation:    Appearance:  age appropriate, casually dressed, adequate grooming   Behavior:  cooperative, calm, good eye contact   Speech:  normal volume, hypertalkative   Mood:  dysphoric   Affect:  normal range and intensity, appropriate   Thought Process:  somewhat tangential   Associations: tangential associations   Thought Content:  persecutory delusions, obsessive thoughts, ruminating thoughts   Perceptual Disturbances: denies auditory or visual hallucinations when asked, does not appear responding to internal stimuli   Risk Potential: Suicidal ideation - None  Homicidal ideation - None  Potential for aggression - No   Sensorium:  oriented to person, place, day of week, month of year and year   Memory:  recent memory intact, remote memory intact   Consciousness:  alert and awake   Attention: attention span and concentration appear shorter than expected for age   Insight:  limited   Judgment: limited   Gait/Station: in bed, unable to assess   Motor Activity: no abnormal movements     Vital signs in last 24 hours:    Temp:  [97 1 °F (36 2 °C)-98 1 °F (36 7 °C)] 98 1 °F (36 7 °C)  HR:  [90-91] 90  Resp:  [16] 16  BP: (118-126)/(76-88) 125/79    Laboratory results:    I have personally reviewed all pertinent laboratory/tests results    Most Recent Labs:   Lab Results   Component Value Date    WBC 9 30 12/19/2019    RBC 4 59 12/19/2019    HGB 13 6 12/19/2019    HCT 39 7 12/19/2019     12/19/2019    RDW 14 4 12/19/2019    NEUTROABS 6 30 12/19/2019    SODIUM 139 12/19/2019    K 3 6 12/19/2019     12/19/2019    CO2 26 12/19/2019    BUN 9 12/19/2019    CREATININE 0 79 12/19/2019    GLUC 115 (H) 12/19/2019    CALCIUM 9 1 12/19/2019    AST 15 12/19/2019    ALT 18 12/19/2019    ALKPHOS 64 12/19/2019    TP 7 2 12/19/2019    ALB 3 7 12/19/2019    TBILI 0 40 12/19/2019    CHOLESTEROL 175 12/19/2019    HDL 21 (L) 12/19/2019    TRIG 114 12/19/2019    LDLCALC 131 (H) 12/19/2019    [place] 154 12/19/2019 PJA6COCINLMY 1 390 12/19/2019    HGBA1C 6 4 (H) 12/19/2019     12/19/2019     CBC:   Lab Results   Component Value Date    WBC 9 30 12/19/2019    RBC 4 59 12/19/2019    HGB 13 6 12/19/2019    HCT 39 7 12/19/2019    MCV 87 12/19/2019     12/19/2019    MCH 29 6 12/19/2019    MCHC 34 2 12/19/2019    RDW 14 4 12/19/2019    MPV 8 3 (L) 12/19/2019    NRBC 0 08/12/2014    NEUTROABS 6 30 12/19/2019     BMP:   Lab Results   Component Value Date    SODIUM 139 12/19/2019    K 3 6 12/19/2019     12/19/2019    CO2 26 12/19/2019    AGAP 8 12/19/2019    BUN 9 12/19/2019    CREATININE 0 79 12/19/2019    GLUC 115 (H) 12/19/2019    CALCIUM 9 1 12/19/2019    EGFR 91 12/19/2019     CMP:   Lab Results   Component Value Date    SODIUM 139 12/19/2019    K 3 6 12/19/2019     12/19/2019    CO2 26 12/19/2019    AGAP 8 12/19/2019    BUN 9 12/19/2019    CREATININE 0 79 12/19/2019    GLUC 115 (H) 12/19/2019    CALCIUM 9 1 12/19/2019    AST 15 12/19/2019    ALT 18 12/19/2019    ALKPHOS 64 12/19/2019    TP 7 2 12/19/2019    ALB 3 7 12/19/2019    TBILI 0 40 12/19/2019    EGFR 91 12/19/2019     Lipid Profile:   Lab Results   Component Value Date    CHOLESTEROL 175 12/19/2019    HDL 21 (L) 12/19/2019    TRIG 114 12/19/2019    LDLCALC 131 (H) 12/19/2019    [place] 154 12/19/2019     Liver Enzymes:   Lab Results   Component Value Date    AST 15 12/19/2019    ALT 18 12/19/2019    ALKPHOS 64 12/19/2019    TP 7 2 12/19/2019    ALB 3 7 12/19/2019    TBILI 0 40 12/19/2019     Thyroid Studies:   Lab Results   Component Value Date    GMT2IUSDNESC 1 390 12/19/2019     RPR: No results found for: RPR  Hemoglobin A1C/EST AVG Glucose   Lab Results   Component Value Date    HGBA1C 6 4 (H) 12/19/2019     12/19/2019     Pregnancy:   Lab Results   Component Value Date    PREGUR negative 12/17/2019     Drug Screen:   Lab Results   Component Value Date    AMPMETHUR Negative 12/17/2019    BARBTUR Negative 12/17/2019    BDZUR Negative 12/17/2019    THCUR Negative 12/17/2019    COCAINEUR Negative 12/17/2019    METHADONEUR Negative 12/17/2019    OPIATEUR Negative 12/17/2019    PCPUR Negative 12/17/2019     Ext Breath Alcohol   Lab Results   Component Value Date    BREATHALC 0 000 12/17/2019     EKG   Lab Results   Component Value Date    VENTRATE 87 08/12/2014    ATRIALRATE 87 08/12/2014    PRINT 164 08/12/2014    QRSDINT 70 08/12/2014    QTINT 378 08/12/2014    QTCINT 615 08/12/2014    PAXIS 68 08/12/2014    QRSAXIS 90 08/12/2014    TWAVEAXIS 46 08/12/2014       Progress Toward Goals: making gradual improvement    Assessment/Plan   Principal Problem:    Schizoaffective disorder, chronic condition with acute exacerbation (HCC)  Active Problems:    Elevated BP without diagnosis of hypertension    Tobacco abuse    Medical clearance for psychiatric admission    Recommended Treatment:     Planned medication and treatment changes:     All current active medications have been reviewed  Encourage group therapy, milieu therapy and occupational therapy  Behavioral Health checks every 7 minutes  Continue current medications:    Current Facility-Administered Medications:  acetaminophen 650 mg Oral Q6H PRN Terry Carnes MD   aluminum-magnesium hydroxide-simethicone 30 mL Oral Q4H PRN Terry Carnes MD   benztropine 1 mg Intramuscular Q6H PRN Terry Carnes MD   benztropine 1 mg Oral Q6H PRN Terry Carnes MD   carbamide peroxide 5 drop Left Ear BID Jeremiah Barone MD   fluticasone 1 spray Each Nare Daily Jeremiah Barone MD   hydrOXYzine HCL 25 mg Oral Q4H PRN Terry Carnes MD   LORazepam 2 mg Intramuscular Q4H PRN Terry Carnes MD   LORazepam 1 mg Oral Q4H PRN Terry Carnes MD   magnesium hydroxide 30 mL Oral Daily PRN Terry Carnes MD   nicotine polacrilex 2 mg Oral Q2H PRN Nolene Osgood, MD   OLANZapine 5 mg Intramuscular Q3H PRN Terry Carnes MD   OLANZapine 5 mg Oral Q3H PRN Terry Carnes MD   paliperidone 6 mg Oral Daily RALPH Wells   paliperidone palmitate  mg Intramuscular Q30 Days RALPH Sims   sodium chloride 1 spray Each Nare Q1H PRN Nae Burger MD   traZODone 50 mg Oral HS PRN Rosa Moore MD       Risks / Benefits of Treatment:    Risks, benefits, and possible side effects of medications explained to patient and patient verbalizes understanding and agreement for treatment  Risks of medications in pregnancy explained if female patient  Patient verbalizes understanding and agrees to notify her doctor if she becomes pregnant  Counseling / Coordination of Care:    Patient's progress reviewed with nursing staff  Medications, treatment progress and treatment plan reviewed with patient  Supportive counseling provided to the patient

## 2019-12-28 NOTE — PROGRESS NOTES
12/28/19 0900   Activity/Group Checklist   Group Other (Comment)  (Surviving Trauma Group/Education, Open Discussion)   Attendance Attended   Attendance Duration (min) Greater than 60   Interactions Interacted appropriately   Affect/Mood Appropriate   Goals Achieved Able to listen to others; Able to engage in interactions; Able to recieve feedback; Able to give feedback to another

## 2019-12-28 NOTE — PROGRESS NOTES
12/28/19 1200   Team Meeting   Meeting Type Daily Rounds   Initial Conference Date 12/28/19   Team Members Present   Team Members Present Nurse;Physician   Physician Team Member Dr Susie Hernández Team Member Johny Mohr   Patient/Family Present   Patient Present No   Patient's Family Present No     Medical, labs and medications reviewed by team  Discharge possible for early next week   Medication changes to be assessed

## 2019-12-28 NOTE — PROGRESS NOTES
Patient is pleasant and co-operative   She usually stays to herself  She will socialize with peers when in the dining room when she is eating   Will continue to offer support and chart her progress

## 2019-12-28 NOTE — PROGRESS NOTES
Pt has been pleasant and cooperative, but isolative  Pt is med/meal compliant  Pt denies any anxiety, depression, SI, HI, AH, or VH  Pt was given nasal spray PRN at 2114  Will continue to monitor

## 2019-12-29 PROCEDURE — 99232 SBSQ HOSP IP/OBS MODERATE 35: CPT | Performed by: NURSE PRACTITIONER

## 2019-12-29 RX ADMIN — PALIPERIDONE 6 MG: 6 TABLET, FILM COATED, EXTENDED RELEASE ORAL at 08:23

## 2019-12-29 RX ADMIN — FLUTICASONE PROPIONATE 1 SPRAY: 50 SPRAY, METERED NASAL at 08:24

## 2019-12-29 RX ADMIN — CARBAMIDE PEROXIDE 6.5% 5 DROP: 6.5 LIQUID AURICULAR (OTIC) at 08:23

## 2019-12-29 NOTE — NURSING NOTE
Patient visible on unit throughout the evening  Pleasant and cooperative with unit routine  Patient made good eye contact and smiled appropriately  Upon approach, patient denied any unmet needs  Observed joining group activities  Patient was HS medication compliant  Will monitor

## 2019-12-29 NOTE — PROGRESS NOTES
12/28/19 1400   Activity/Group Checklist   Group Other (Comment)  (OPEN STUDIO/Art Therapy, Social Interaction-Expression)   Attendance Attended   Attendance Duration (min) Greater than 60   Interactions Interacted appropriately   Affect/Mood Appropriate   Goals Achieved Able to listen to others; Able to engage in interactions

## 2019-12-29 NOTE — PROGRESS NOTES
Progress Note - [address] Quijano 38 y o  female MRN: [id number]   Unit/Bed#: Suad Wright 374-02 Encounter: [id number]    Behavior over the last 24 hours: unchanged  Jin Mckinney was seen today for continuation of care, records reviewed, patient was discussed with her nurse  Per staff patient has been eating well, sleeping well and has been attending groups often on, she does socialize with peers while in the dining room and has been supportive to her peers  Upon approach today Jin Mckinney is calm cooperative pleasant and brighter than yesterday  She continues to have persecutory delusions, stating she had somewhat of a difficult night due to her roommate having an inappropriate interaction with a male on the floor which made her feel uncomfortable as she was writing notes back and forth with him  She states that she felt that she was getting pulled into the situation and she did not want to get herself into trouble  She states that she was up until 1 in the morning so she worked on a puzzle to distract herself and then she was awake again at 6:00 a m  So she was feeling a bit sleepy today  She denies physical symptoms except for some hand tingling and numbness bilaterally as well as some finger joint pain she states that this started over the summer and has been off an on since that time and this has not been new since admission  No hand tremors are noted no dystonic movements are noted or EPS  Patient denies SI/HI, denies auditory and visual hallucinations      Sleep: difficulty falling asleep, early awakening  Appetite: normal  Medication side effects: No   ROS: reports Bilateral hand tingling and numbness and finger joint pain which started over the summer which has been unchanged since that time, all other systems are negative    Mental Status Evaluation:    Appearance:  age appropriate, casually dressed, adequate grooming   Behavior:  pleasant, cooperative, calm, good eye contact   Speech:  normal rate, normal volume, normal pitch   Mood:  slightly dysphoric   Affect:  slightly brighter   Thought Process:  slightly tangential   Associations: tangential associations   Thought Content:  persecutory delusions, obsessive thoughts, negative thinking   Perceptual Disturbances: no auditory hallucinations, no visual hallucinations   Risk Potential: Suicidal ideation - None  Homicidal ideation - None  Potential for aggression - No   Sensorium:  oriented to person, place and time/date   Memory:  recent memory intact, remote memory intact   Consciousness:  alert and awake   Attention: attention span and concentration appear shorter than expected for age   Insight:  limited   Judgment: limited   Gait/Station: normal gait/station, normal balance   Motor Activity: no abnormal movements     Vital signs in last 24 hours:    Temp:  [98 1 °F (36 7 °C)-98 7 °F (37 1 °C)] 98 7 °F (37 1 °C)  HR:  [89-90] 89  Resp:  [16] 16  BP: (123-125)/(79-82) 123/82    Laboratory results:  No new labs to review today    Progress Toward Goals: making slow improvement    Assessment/Plan   Principal Problem:    Schizoaffective disorder, chronic condition with acute exacerbation (HCC)  Active Problems:    Elevated BP without diagnosis of hypertension    Tobacco abuse    Medical clearance for psychiatric admission    Recommended Treatment:     Planned medication and treatment changes:    -All current active medications have been reviewed  -Encourage group therapy, milieu therapy and occupational therapy  -Behavioral Health checks every 7 minutes  -Continue Invega 6 mg p o   Daily  -last dose Invega Sustenna 156 mg IM given 12/18/2019; next dose due Invega Sustenna 156 mg IM 01/17/2020  -discharge disposition planning per case management  -Continue current medications:    Current Facility-Administered Medications:  acetaminophen 650 mg Oral Q6H PRN Deborah Mendoza MD   aluminum-magnesium hydroxide-simethicone 30 mL Oral Q4H PRN Deborah Mendoza MD benztropine 1 mg Intramuscular Q6H PRN Sierra Julien MD   benztropine 1 mg Oral Q6H PRN Sierra Julien MD   carbamide peroxide 5 drop Left Ear BID Arlen Hendrix MD   fluticasone 1 spray Each Nare Daily Arlen Hendrix MD   hydrOXYzine HCL 25 mg Oral Q4H PRN Sierra Julien MD   LORazepam 2 mg Intramuscular Q4H PRN Sierra Julien MD   LORazepam 1 mg Oral Q4H PRN Sierra Julien MD   magnesium hydroxide 30 mL Oral Daily PRN Sierra Julien MD   nicotine polacrilex 2 mg Oral Q2H PRN Hortencia Kc MD   OLANZapine 5 mg Intramuscular Q3H PRN Sierra Julien MD   OLANZapine 5 mg Oral Q3H PRN Sierra Julien MD   paliperidone 6 mg Oral Daily Jaswinder Squibb, CRNP   paliperidone palmitate  mg Intramuscular Q30 Days Jaswinder Squibb, CRNP   sodium chloride 1 spray Each Nare Q1H PRN Arlen Hendrix MD   traZODone 50 mg Oral HS PRN Sierra Julien MD       Risks / Benefits of Treatment:    Risks, benefits, and possible side effects of medications explained to patient and patient verbalizes understanding and agreement for treatment  Risks of medications in pregnancy explained if female patient  Patient verbalizes understanding and agrees to notify her doctor if she becomes pregnant  Counseling / Coordination of Care:    Patient's progress reviewed with nursing staff  Medications, treatment progress and treatment plan reviewed with patient  Supportive counseling provided to the patient

## 2019-12-29 NOTE — PROGRESS NOTES
12/29/19 1400   Team Meeting   Meeting Type Daily Rounds   Initial Conference Date 12/29/19   Team Members Present   Team Members Present Physician;Nurse   Physician Team Member Dr Janet Bhatti Team Member Celestino Goldstein   Patient/Family Present   Patient Present No   Patient's Family Present No     Medical, labs and medications reviewed by team  Discharge to be determined   Medication changes to be assessed

## 2019-12-29 NOTE — NURSING NOTE
Patient remains awake due to difficulty falling asleep  Refused PRNs  [facility] [place] [facility]  No issues noted  Will monitor

## 2019-12-29 NOTE — PROGRESS NOTES
Patient is pleasant and co-operative  She  Stays mostly to  Herself She may attend a group or two every once in a while  She does socialize with peers when in the dining room   Will continue to offer support and encourage  groups

## 2019-12-30 PROCEDURE — 99232 SBSQ HOSP IP/OBS MODERATE 35: CPT | Performed by: NURSE PRACTITIONER

## 2019-12-30 RX ADMIN — PALIPERIDONE 6 MG: 6 TABLET, FILM COATED, EXTENDED RELEASE ORAL at 08:17

## 2019-12-30 RX ADMIN — FLUTICASONE PROPIONATE 1 SPRAY: 50 SPRAY, METERED NASAL at 08:17

## 2019-12-30 NOTE — PLAN OF CARE
Worker spoke with Kirit Deshpande at [facility]  Pt was added to Shelter program list  Pt is able to arrive at [facility] for [facility]  Kirit Deshpande explained she may even have a bed become available for the Shelter Program so pt is to request to speak with Jose David Ceja or Kirit Deshpande upon arriving at the [facility]

## 2019-12-30 NOTE — PLAN OF CARE
Worker scheduled pt's follow up at [facility] for psychiatry on 1/22 at 9:45AM and therapy on 1/24 at 10AM

## 2019-12-30 NOTE — PROGRESS NOTES
12/30/19 0834   Team Meeting   Meeting Type Daily Rounds   Initial Conference Date 12/30/19   Team Members Present   Team Members Present Physician;Nurse;   Physician Team Member Dr Elysia Kingston Team Member [id number] [facility] Management Team Member Dar Mulligan   Patient/Family Present   Patient Present No   Patient's Family Present No   Meds to be monitored  Cooperative and pleasant, showing improvement  Discharge for 12/31

## 2019-12-30 NOTE — PROGRESS NOTES
Patient pleasant upon approach  Patient denies anxiety, depression, S/HI, A/VH and pain  Med and meal compliant  Visible and some social with peers  Pending discharge 12/31/19  Will continue to monitor and provide therapeutic support

## 2019-12-30 NOTE — NURSING NOTE
Patient visible on unit throughout the evening  Pleasant and cooperative with unit routine  Upon approach, patient made good eye contact and smiled  Denied any unmet needs  Refused ear drops at HS  Will monitor

## 2019-12-30 NOTE — PROGRESS NOTES
Progress Note - 14 Jennifer Quijano 38 y o  female MRN: [id number]  Unit/Bed#: Mayank Quezada 983-54 Encounter: [id number]    The patient was seen for continuing care and reviewed with treatment team  Staff reports that the patient remains calm, cooperative, and compliant with medications  Remains visible on the unit and continues to be selectively social with peers  Attending groups  During assessment the patient appears to be improving in conversation  Reports periods of paranoid and delusional thoughts, but is able to revert back to present  Patient appears more focused on future goals and reports improved mood and sleep pattern  Remains pleasant on approach and improved eye contact is noted  Denies any side effects to medications  No current medication changes are noted  Scheduled for [facility]       Mental Status Evaluation:  Appearance:  Adequate hygiene and grooming   Behavior:  cooperative   Fund of knowledge  improving   Speech:   Language: Normal rate and Normal volume; less pressured and rambling  No overt abnormality   Mood:  improving   Affect:   Associations: appropriate  Intact   Thought Process:  Improved linear thought process   Thought Content:  Paranoid and mistrustful, but decreasing   Perceptual Disturbances: Denies hallucinations and does not appear to be responding to internal stimuli   Risk Potential: No suicidal or homicidal ideation   Orientation  Oriented to place and person   Memory Not tested   Attention/Concentration attention span appeared shorter than expected for age   Insight:  limited   Judgment: Limited   Gait/Station: normal gait/station and normal balance   Motor Activity: No abnormal movement noted     Progress Toward Goals: improving    Assessment/Plan    Principal Problem:    Schizoaffective disorder, chronic condition with acute exacerbation (HCC)  Active Problems:    Elevated BP without diagnosis of hypertension    Tobacco abuse    Medical clearance for psychiatric admission      Recommended Treatment: Continue with pharmacotherapy, group therapy, milieu therapy and occupational therapy  The patient will be maintained on the following medications:    Current Facility-Administered Medications:  acetaminophen 650 mg Oral Q6H PRN Rosa Moore MD   aluminum-magnesium hydroxide-simethicone 30 mL Oral Q4H PRN Rosa Moore MD   benztropine 1 mg Intramuscular Q6H PRN Rosa Moore MD   benztropine 1 mg Oral Q6H PRN Rosa Moore MD   carbamide peroxide 5 drop Left Ear BID Nae Burger MD   fluticasone 1 spray Each Nare Daily Nae Burger MD   hydrOXYzine HCL 25 mg Oral Q4H PRN Rosa Moore MD   LORazepam 2 mg Intramuscular Q4H PRN Rosa Moore MD   LORazepam 1 mg Oral Q4H PRN Rosa Moore MD   magnesium hydroxide 30 mL Oral Daily PRN Rosa Moore MD   nicotine polacrilex 2 mg Oral Q2H PRN Dora Barbosa MD   OLANZapine 5 mg Intramuscular Q3H PRN Rosa Moore MD   OLANZapine 5 mg Oral Q3H PRN Rosa Moore MD   paliperidone 6 mg Oral Daily RALPH Sims   paliperidone palmitate  mg Intramuscular Q30 Days RALPH Sims   sodium chloride 1 spray Each Nare Q1H PRN Nae Burger MD   traZODone 50 mg Oral HS PRN Rosa Moore MD       Risks, benefits and possible side effects of Medications:   Risks, benefits, and possible side effects of medications explained to patient and patient verbalizes understanding

## 2019-12-30 NOTE — SOCIAL WORK
Worker spoke with pt and informed her that discharge was planned for tomorrow  Pt explained no one had informed her of that  Pt initially reported she did not feel ready and would feel better if she was discharged next week  Worker explained she would have to discuss this with the psychiatrist  Worker informed pt she is on the wait list at [facility] and was informed there could be a bed available as early as tomorrow  Pt reports she does not want to miss this opportunity for the bed, but is not sure she is ready to leave  Worker asked pt to think about it for a little      Pt approached worker later and states that she feels she should be ready to leave tomorrow and does not want to miss this chance of getting into the shelter program

## 2019-12-30 NOTE — PROGRESS NOTES
12/30/19 1400   Activity/Group Checklist   Group   (Relapse Prevention )   Attendance Attended   Attendance Duration (min) 46-60   Interactions Interacted appropriately   Affect/Mood Appropriate;Normal range   Goals Achieved Identified feelings; Identified triggers; Discussed coping strategies; Increased hopefulness;Verbalized increased hopefulness; Able to manage/cope with feelings; Able to reflect/comment on own behavior;Able to engage in interactions; Able to listen to others; Able to self-disclose

## 2019-12-31 VITALS
HEIGHT: 63 IN | SYSTOLIC BLOOD PRESSURE: 128 MMHG | HEART RATE: 97 BPM | WEIGHT: 182 LBS | BODY MASS INDEX: 32.25 KG/M2 | OXYGEN SATURATION: 98 % | RESPIRATION RATE: 16 BRPM | TEMPERATURE: 97.9 F | DIASTOLIC BLOOD PRESSURE: 94 MMHG

## 2019-12-31 PROCEDURE — 99239 HOSP IP/OBS DSCHRG MGMT >30: CPT | Performed by: NURSE PRACTITIONER

## 2019-12-31 RX ORDER — ECHINACEA PURPUREA EXTRACT 125 MG
1 TABLET ORAL
Qty: 15 ML | Refills: 0 | Status: SHIPPED | OUTPATIENT
Start: 2019-12-31 | End: 2021-03-30

## 2019-12-31 RX ORDER — PALIPERIDONE 6 MG/1
6 TABLET, EXTENDED RELEASE ORAL DAILY
Qty: 30 TABLET | Refills: 0 | Status: ON HOLD | OUTPATIENT
Start: 2020-01-01 | End: 2020-02-19 | Stop reason: SDUPTHER

## 2019-12-31 RX ORDER — FLUTICASONE PROPIONATE 50 MCG
1 SPRAY, SUSPENSION (ML) NASAL DAILY
Qty: 1 BOTTLE | Refills: 0 | Status: SHIPPED | OUTPATIENT
Start: 2020-01-01 | End: 2021-03-30

## 2019-12-31 RX ORDER — ECHINACEA PURPUREA EXTRACT 125 MG
1 TABLET ORAL
Qty: 15 ML | Refills: 0 | Status: SHIPPED | OUTPATIENT
Start: 2019-12-31 | End: 2019-12-31

## 2019-12-31 RX ADMIN — FLUTICASONE PROPIONATE 1 SPRAY: 50 SPRAY, METERED NASAL at 08:59

## 2019-12-31 RX ADMIN — PALIPERIDONE 6 MG: 6 TABLET, FILM COATED, EXTENDED RELEASE ORAL at 08:58

## 2019-12-31 NOTE — PROGRESS NOTES
Patient pleasant upon approach  Denies anxiety, depression, S/HI,A/VH and pain  Med and meal compliant  Visible and social with peers  Pending discharge today  Will monitor

## 2019-12-31 NOTE — NURSING NOTE
Patient and this author reviewed discharge instructions and pt has no questions at this time  Patient denies all psych symptoms  Denies S/HI  Patient has all paperwork, scripts and belongings in possession at time of discharge  Patient traveling by [place] in a private vehicle  Escorted off the unit by unit staff

## 2019-12-31 NOTE — PROGRESS NOTES
12/31/19 0817   Team Meeting   Meeting Type Daily Rounds   Initial Conference Date 12/31/19   Team Members Present   Team Members Present Physician;Nurse;   Physician Team Member Dr Meri Trinh Team Member [id number] [facility] Management Team Member Thomas Lipscomb   Patient/Family Present   Patient Present No   Patient's Family Present No   Pt to d/c to [facility] today

## 2019-12-31 NOTE — PROGRESS NOTES
Patient received at 1900, and was pleasant upon approach  Patient was visible in milieu as she was observed ambulating the unit, and she was observed socializing with select peers  Patient denies SI, HI, A/V hallucinations, pain, anxiety and depression  Patient was cooperative with assessment and medication administration, and continued on q7 minute checks for patient safety  Will continue to monitor and offer therapeutic support

## 2019-12-31 NOTE — PLAN OF CARE
Pt to be discharged today  Pt denies SI/HI, AH/VH  Pt oriented x3  Pt to go to [facility]  Pt to follow up with [facility] on 1/22 for psychiatry at 9:45AM and on 1/24 for therapy at 10AM  Pt will be sent with scripts  Pt will be arranged a Lyft for transport to [facility]

## 2019-12-31 NOTE — SOCIAL WORK
Worker notified pt's mother of discharge today and discharge plan  Mother verbalized understanding  Mother reports she feels like she has done all she can to help her daughter and that when there is a fixed belief that people are always following her there is only so much she can do to help her daughter  Mother hopes pt can manage on her own  Mother with no further questions

## 2019-12-31 NOTE — DISCHARGE SUMMARY
Discharge Summary - 132[address] Vicki Foote 38 y o  female MRN: [id number]  Unit/Bed#: Rohit Bello 620-46 Encounter: [id number]     Admission Date: 12/18/2019         Discharge Date: 12/31/2019    Attending Psychiatrist: Mary Duke MD    Reason for Admission/HPI:     Armando Poe is a 38year old female with a history of schizoaffective disorder who was admitted to the inpatient psychiatric unit on a voluntary 201 commitment basis due to depression, anxiety, instability in mood, and acute signs of psychosis  Symptoms prior to admission included hopelessness, helplessness, paranoia, delusional thoughts, and poor concentration  Onset of these symptoms started several weeks prior and gradually have worsened since that time  Stressors preceding admission included chronic mental health, financial stress, and limited support  On initial evaluation the patient was calm and cooperative, but voiced ongoing paranoia and delusional thoughts  Meds/Allergies     all current active meds have been reviewed    Allergies   Allergen Reactions    Risperidone Shortness Of Breath    Haloperidol Other (See Comments)     Tardive Dyskinesia         Objective     Vital signs in last 24 hours:  Temp:  [97 9 °F (36 6 °C)] 97 9 °F (36 6 °C)  HR:  [83-97] 97  Resp:  [16] 16  BP: (128-129)/(83-94) 128/94    No intake or output data in the 24 hours ending 12/31/19 [address] [place] [address] Course: The patient was admitted to the inpatient psychiatric unit and started on every 15 minutes precautions  During the hospitalization the patient was attending individual therapy, group therapy, milieu therapy and occupational therapy  Psychiatric medications were titrated over the hospital stay  To address depressive symptoms, mood instability, psychotic symptoms, paranoid ideation, delusional thoughts and anxiety symptoms the patient was started on antipsychotic medication Invega  Medication doses were titrated during the hospital course  Prior to beginning of treatment medications risks and benefits and possible side effects including risk of parkinsonian symptoms, Tardive Dyskinesia and metabolic syndrome related to treatment with antipsychotic medications were reviewed with the patient  Patient was also continued on Invega Sustenna 156 mg for ongoing symptom management  The patient's next scheduled injection is January 18, 2020  She will be given the injection by [facility]  The patient verbalized understanding and agreement for treatment  Patient's symptoms improved gradually over the hospital course  At the end of treatment the patient was doing well  Mood was stable at the time of discharge  The patient denied suicidal ideation, intent or plan at the time of discharge and denied homicidal ideation, intent or plan at the time of discharge  There was no overt psychosis at the time of discharge  Sleep and appetite were improved  The patient was tolerating medications and was not reporting any significant side effects at the time of discharge  Since the patient was doing well at the end of the hospitalization, treatment team felt that the patient could be safely discharged to outpatient care  Patient was discharged to [facility] for ongoing treatment and housing  Patient is also scheduled to follow up with [facility] on 1/22 and therapy on 1/24       Mental Status at Time of Discharge:   Appearance:  Adequate hygiene and grooming   Behavior:  cooperative   Speech:   Language: Normal rate and Normal volume  No overt abnormality   Mood:  improving   Affect:   Associations: appropriate  intact   Thought Process:  linear   Thought Content:  Paranoid and mistrustful and Delusions of persecution, but improving and able to revert to original conversation   Perceptual Disturbances: Denies hallucinations and does not appear to be responding to internal stimuli     Risk Potential: No suicidal or homicidal ideation   Orientation Language Oriented x 3  anomia No   Memory  Fund of knowledge Not tested  aware of current events and Aware of past history   Attention/Concentration attention span appeared shorter than expected for age   Insight:  limited   Judgment: Limited   Gait/Station: normal gait/station and normal balance   Motor Activity: No abnormal movement noted       Admission Diagnosis:  Principal Problem:    Schizoaffective disorder, chronic condition with acute exacerbation ([facility] )  Active Problems:    Elevated BP without diagnosis of hypertension    Tobacco abuse    Medical clearance for psychiatric admission      Discharge Diagnosis:     Principal Problem:    Schizoaffective disorder, chronic condition with acute exacerbation ([facility] )  Active Problems:    Elevated BP without diagnosis of hypertension    Tobacco abuse    Medical clearance for psychiatric admission  Resolved Problems:    * No resolved hospital problems   *      Lab results:    Admission on 12/18/2019   Component Date Value    WBC 12/19/2019 9 30     RBC 12/19/2019 4 59     Hemoglobin 12/19/2019 13 6     Hematocrit 12/19/2019 39 7     MCV 12/19/2019 87     MCH 12/19/2019 29 6     MCHC 12/19/2019 34 2     RDW 12/19/2019 14 4     MPV 12/19/2019 8 3*    Platelets 27/55/1007 336     Neutrophils Relative 12/19/2019 68*    Lymphocytes Relative 12/19/2019 23*    Monocytes Relative 12/19/2019 6     Eosinophils Relative 12/19/2019 3     Basophils Relative 12/19/2019 1     Neutrophils Absolute 12/19/2019 6 30     Lymphocytes Absolute 12/19/2019 2 10     Monocytes Absolute 12/19/2019 0 50     Eosinophils Absolute 12/19/2019 0 30     Basophils Absolute 12/19/2019 0 00     Sodium 12/19/2019 139     Potassium 12/19/2019 3 6     Chloride 12/19/2019 105     CO2 12/19/2019 26     ANION GAP 12/19/2019 8     BUN 12/19/2019 9     Creatinine 12/19/2019 0 79     Glucose 12/19/2019 115*    Calcium 12/19/2019 9 1     AST 12/19/2019 15     ALT 12/19/2019 18     Alkaline Phosphatase 12/19/2019 64     Total Protein 12/19/2019 7 2     Albumin 12/19/2019 3 7     Total Bilirubin 12/19/2019 0 40     eGFR 12/19/2019 91     Cholesterol 12/19/2019 175     Triglycerides 12/19/2019 114     HDL, Direct 12/19/2019 21*    LDL Calculated 12/19/2019 131*    Non-HDL-Chol (CHOL-HDL) 12/19/2019 154     TSH 3RD GENERATON 12/19/2019 1 390     Hemoglobin A1C 12/19/2019 6 4*    EAG 12/19/2019 137        Discharge Medications:    See after visit summary for reconciled discharge medications provided to patient and family  Discharge instructions/Information to patient and family:     See after visit summary for information provided to patient and family  Provisions for Follow-Up Care:    See after visit summary for information related to follow-up care and any pertinent home health orders  Discharge Statement     I spent 35 minutes discharging the patient  This time was spent on the day of discharge  I had direct contact with the patient on the day of discharge  Additional documentation is required if more than 30 minutes were spent on discharge:    I reviewed with Janeen Preciado importance of compliance with medications and outpatient treatment after discharge  I discussed the medication regimen and possible side effects of the medications with Janeen Preciado prior to discharge  At the time of discharge she was tolerating psychiatric medications  I discussed outpatient follow up with Janeen Preciado  I reviewed with Janeen Preciado crisis plan and safety plan upon discharge  Janeen Preciado was competent to understand risks and benefits of withholding information and risks and benefits of her actions

## 2019-12-31 NOTE — PROGRESS NOTES
12/31/19 1000   Activity/Group Checklist   Group Other (Comment)  (Art Therapy Group/Quotes, Process Discussion)   Attendance Attended   Attendance Duration (min) Greater than 60   Interactions Interacted appropriately   Affect/Mood Appropriate   Goals Achieved Able to listen to others; Able to engage in interactions; Able to recieve feedback; Able to give feedback to another  (Able to engage materials; full participation)

## 2020-02-03 ENCOUNTER — HOSPITAL ENCOUNTER (INPATIENT)
Facility: HOSPITAL | Age: 45
LOS: 15 days | Discharge: HOME | DRG: 885 | End: 2020-02-19
Attending: EMERGENCY MEDICINE | Admitting: PSYCHIATRY & NEUROLOGY
Payer: COMMERCIAL

## 2020-02-03 DIAGNOSIS — R45.851 SUICIDAL IDEATIONS: Primary | ICD-10-CM

## 2020-02-03 DIAGNOSIS — F25.9 SCHIZOAFFECTIVE DISORDER (HCC): ICD-10-CM

## 2020-02-03 DIAGNOSIS — F25.9 SCHIZOAFFECTIVE DISORDER, CHRONIC CONDITION WITH ACUTE EXACERBATION (HCC): ICD-10-CM

## 2020-02-03 PROCEDURE — 99283 EMERGENCY DEPT VISIT LOW MDM: CPT | Performed by: EMERGENCY MEDICINE

## 2020-02-03 PROCEDURE — 99285 EMERGENCY DEPT VISIT HI MDM: CPT

## 2020-02-03 PROCEDURE — 82075 ASSAY OF BREATH ETHANOL: CPT | Performed by: EMERGENCY MEDICINE

## 2020-02-03 PROCEDURE — 81025 URINE PREGNANCY TEST: CPT | Performed by: EMERGENCY MEDICINE

## 2020-02-03 PROCEDURE — 80307 DRUG TEST PRSMV CHEM ANLYZR: CPT | Performed by: EMERGENCY MEDICINE

## 2020-02-03 RX ORDER — LORAZEPAM 0.5 MG/1
1 TABLET ORAL ONCE
Status: COMPLETED | OUTPATIENT
Start: 2020-02-04 | End: 2020-02-03

## 2020-02-03 RX ADMIN — LORAZEPAM 1 MG: 0.5 TABLET ORAL at 23:54

## 2020-02-04 PROBLEM — R73.01 IMPAIRED FASTING GLUCOSE: Status: ACTIVE | Noted: 2020-02-04

## 2020-02-04 PROBLEM — D72.829 LEUKOCYTOSIS: Status: ACTIVE | Noted: 2020-02-04

## 2020-02-04 PROBLEM — J31.0 CHRONIC RHINITIS: Status: ACTIVE | Noted: 2020-02-04

## 2020-02-04 LAB
ALBUMIN SERPL BCP-MCNC: 3.8 G/DL (ref 3–5.2)
ALP SERPL-CCNC: 79 U/L (ref 43–122)
ALT SERPL W P-5'-P-CCNC: 21 U/L (ref 9–52)
ANION GAP SERPL CALCULATED.3IONS-SCNC: 9 MMOL/L (ref 5–14)
AST SERPL W P-5'-P-CCNC: 19 U/L (ref 14–36)
BASOPHILS # BLD AUTO: 0.1 THOUSANDS/ΜL (ref 0–0.1)
BASOPHILS NFR BLD AUTO: 1 % (ref 0–1)
BILIRUB SERPL-MCNC: 0.4 MG/DL
BILIRUB UR QL STRIP: NEGATIVE
BUN SERPL-MCNC: 4 MG/DL (ref 5–25)
CALCIUM SERPL-MCNC: 8.7 MG/DL (ref 8.4–10.2)
CHLORIDE SERPL-SCNC: 103 MMOL/L (ref 97–108)
CHOLEST SERPL-MCNC: 180 MG/DL
CLARITY UR: CLEAR
CO2 SERPL-SCNC: 24 MMOL/L (ref 22–30)
COLOR UR: NORMAL
CREAT SERPL-MCNC: 0.67 MG/DL (ref 0.6–1.2)
EOSINOPHIL # BLD AUTO: 0.4 THOUSAND/ΜL (ref 0–0.4)
EOSINOPHIL NFR BLD AUTO: 3 % (ref 0–6)
ERYTHROCYTE [DISTWIDTH] IN BLOOD BY AUTOMATED COUNT: 14.6 %
GFR SERPL CREATININE-BSD FRML MDRD: 106 ML/MIN/1.73SQ M
GLUCOSE P FAST SERPL-MCNC: 111 MG/DL (ref 70–99)
GLUCOSE SERPL-MCNC: 111 MG/DL (ref 70–99)
GLUCOSE UR STRIP-MCNC: NEGATIVE MG/DL
HCT VFR BLD AUTO: 41.4 % (ref 36–46)
HDLC SERPL-MCNC: 23 MG/DL
HGB BLD-MCNC: 13.9 G/DL (ref 12–16)
HGB UR QL STRIP.AUTO: NEGATIVE
KETONES UR STRIP-MCNC: NEGATIVE MG/DL
LDLC SERPL CALC-MCNC: 130 MG/DL
LEUKOCYTE ESTERASE UR QL STRIP: NEGATIVE
LYMPHOCYTES # BLD AUTO: 2.1 THOUSANDS/ΜL (ref 0.5–4)
LYMPHOCYTES NFR BLD AUTO: 19 % (ref 25–45)
MCH RBC QN AUTO: 28.9 PG (ref 26–34)
MCHC RBC AUTO-ENTMCNC: 33.6 G/DL (ref 31–36)
MCV RBC AUTO: 86 FL (ref 80–100)
MONOCYTES # BLD AUTO: 0.6 THOUSAND/ΜL (ref 0.2–0.9)
MONOCYTES NFR BLD AUTO: 6 % (ref 1–10)
NEUTROPHILS # BLD AUTO: 8 THOUSANDS/ΜL (ref 1.8–7.8)
NEUTS SEG NFR BLD AUTO: 72 % (ref 45–65)
NITRITE UR QL STRIP: NEGATIVE
NONHDLC SERPL-MCNC: 157 MG/DL
PH UR STRIP.AUTO: 6 [PH]
PLATELET # BLD AUTO: 347 THOUSANDS/UL (ref 150–450)
PMV BLD AUTO: 8.2 FL (ref 8.9–12.7)
POTASSIUM SERPL-SCNC: 3.6 MMOL/L (ref 3.6–5)
PROT SERPL-MCNC: 7.4 G/DL (ref 5.9–8.4)
PROT UR STRIP-MCNC: NEGATIVE MG/DL
RBC # BLD AUTO: 4.82 MILLION/UL (ref 4–5.2)
RPR SER QL: NORMAL
SODIUM SERPL-SCNC: 136 MMOL/L (ref 137–147)
SP GR UR STRIP.AUTO: 1.01 (ref 1–1.04)
TRIGL SERPL-MCNC: 134 MG/DL
TSH SERPL DL<=0.05 MIU/L-ACNC: 3.69 UIU/ML (ref 0.47–4.68)
UROBILINOGEN UA: NEGATIVE MG/DL
WBC # BLD AUTO: 11.1 THOUSAND/UL (ref 4.5–11)

## 2020-02-04 PROCEDURE — 84443 ASSAY THYROID STIM HORMONE: CPT | Performed by: PSYCHIATRY & NEUROLOGY

## 2020-02-04 PROCEDURE — 86592 SYPHILIS TEST NON-TREP QUAL: CPT | Performed by: PSYCHIATRY & NEUROLOGY

## 2020-02-04 PROCEDURE — 80061 LIPID PANEL: CPT | Performed by: PSYCHIATRY & NEUROLOGY

## 2020-02-04 PROCEDURE — 99222 1ST HOSP IP/OBS MODERATE 55: CPT | Performed by: PHYSICIAN ASSISTANT

## 2020-02-04 PROCEDURE — 99222 1ST HOSP IP/OBS MODERATE 55: CPT | Performed by: PSYCHIATRY & NEUROLOGY

## 2020-02-04 PROCEDURE — 80053 COMPREHEN METABOLIC PANEL: CPT | Performed by: PSYCHIATRY & NEUROLOGY

## 2020-02-04 PROCEDURE — 85025 COMPLETE CBC W/AUTO DIFF WBC: CPT | Performed by: PSYCHIATRY & NEUROLOGY

## 2020-02-04 RX ORDER — BENZTROPINE MESYLATE 1 MG/1
1 TABLET ORAL EVERY 6 HOURS PRN
Status: DISCONTINUED | OUTPATIENT
Start: 2020-02-04 | End: 2020-02-19 | Stop reason: HOSPADM

## 2020-02-04 RX ORDER — FLUTICASONE PROPIONATE 50 MCG
1 SPRAY, SUSPENSION (ML) NASAL DAILY
Status: DISCONTINUED | OUTPATIENT
Start: 2020-02-04 | End: 2020-02-19 | Stop reason: HOSPADM

## 2020-02-04 RX ORDER — TRAZODONE HYDROCHLORIDE 50 MG/1
50 TABLET ORAL
Status: DISCONTINUED | OUTPATIENT
Start: 2020-02-04 | End: 2020-02-19 | Stop reason: HOSPADM

## 2020-02-04 RX ORDER — LORAZEPAM 1 MG/1
1 TABLET ORAL EVERY 8 HOURS PRN
Status: DISCONTINUED | OUTPATIENT
Start: 2020-02-04 | End: 2020-02-19 | Stop reason: HOSPADM

## 2020-02-04 RX ORDER — LORAZEPAM 2 MG/ML
2 INJECTION INTRAMUSCULAR EVERY 6 HOURS PRN
Status: DISCONTINUED | OUTPATIENT
Start: 2020-02-04 | End: 2020-02-19 | Stop reason: HOSPADM

## 2020-02-04 RX ORDER — MAGNESIUM HYDROXIDE/ALUMINUM HYDROXICE/SIMETHICONE 120; 1200; 1200 MG/30ML; MG/30ML; MG/30ML
30 SUSPENSION ORAL EVERY 4 HOURS PRN
Status: DISCONTINUED | OUTPATIENT
Start: 2020-02-04 | End: 2020-02-19 | Stop reason: HOSPADM

## 2020-02-04 RX ORDER — LORAZEPAM 2 MG/ML
2 INJECTION INTRAMUSCULAR EVERY 6 HOURS PRN
Status: DISCONTINUED | OUTPATIENT
Start: 2020-02-04 | End: 2020-02-04

## 2020-02-04 RX ORDER — BENZTROPINE MESYLATE 1 MG/ML
1 INJECTION INTRAMUSCULAR; INTRAVENOUS EVERY 6 HOURS PRN
Status: DISCONTINUED | OUTPATIENT
Start: 2020-02-04 | End: 2020-02-19 | Stop reason: HOSPADM

## 2020-02-04 RX ORDER — OLANZAPINE 5 MG/1
5 TABLET ORAL 2 TIMES DAILY PRN
Status: DISCONTINUED | OUTPATIENT
Start: 2020-02-04 | End: 2020-02-19 | Stop reason: HOSPADM

## 2020-02-04 RX ORDER — LORATADINE 10 MG/1
10 TABLET ORAL DAILY
Status: DISCONTINUED | OUTPATIENT
Start: 2020-02-04 | End: 2020-02-19 | Stop reason: HOSPADM

## 2020-02-04 RX ORDER — IBUPROFEN 600 MG/1
600 TABLET ORAL EVERY 8 HOURS PRN
Status: DISCONTINUED | OUTPATIENT
Start: 2020-02-04 | End: 2020-02-19 | Stop reason: HOSPADM

## 2020-02-04 RX ORDER — NICOTINE 21 MG/24HR
1 PATCH, TRANSDERMAL 24 HOURS TRANSDERMAL DAILY
Status: DISCONTINUED | OUTPATIENT
Start: 2020-02-04 | End: 2020-02-05

## 2020-02-04 RX ORDER — OLANZAPINE 10 MG/1
5 INJECTION, POWDER, LYOPHILIZED, FOR SOLUTION INTRAMUSCULAR 2 TIMES DAILY PRN
Status: DISCONTINUED | OUTPATIENT
Start: 2020-02-04 | End: 2020-02-04

## 2020-02-04 RX ORDER — OLANZAPINE 10 MG/1
5 INJECTION, POWDER, LYOPHILIZED, FOR SOLUTION INTRAMUSCULAR 2 TIMES DAILY PRN
Status: DISCONTINUED | OUTPATIENT
Start: 2020-02-04 | End: 2020-02-19 | Stop reason: HOSPADM

## 2020-02-04 RX ORDER — ACETAMINOPHEN 325 MG/1
650 TABLET ORAL EVERY 6 HOURS PRN
Status: DISCONTINUED | OUTPATIENT
Start: 2020-02-04 | End: 2020-02-19 | Stop reason: HOSPADM

## 2020-02-04 RX ADMIN — FLUTICASONE PROPIONATE 1 SPRAY: 50 SPRAY, METERED NASAL at 16:15

## 2020-02-04 RX ADMIN — PALIPERIDONE 9 MG: 6 TABLET, FILM COATED, EXTENDED RELEASE ORAL at 12:13

## 2020-02-04 RX ADMIN — LORATADINE 10 MG: 10 TABLET ORAL at 17:13

## 2020-02-04 NOTE — CONSULTS
Consult- Oh Armendariz 1975, 38 y o  female MRN: [id number]    Unit/Bed#: Eula You 877-65 Encounter: [id number]    Primary Care Provider: Tori Daniels MD   Date and time admitted to hospital: 2/3/2020 10:26 PM      Inpatient consult for Medical Clearance for [facility] patient  Consult performed by: Caden Branham PA-C  Consult ordered by: Lena Torres MD          Chronic rhinitis  Assessment & Plan  +claritin and flonase NS    Impaired fasting glucose  Assessment & Plan  hga1c 6 4 (12/19)  Diet controlled     Leukocytosis  Assessment & Plan  Mild, wbc 12/19 9 3  Likely stress reaction, would recommend f/u with PCP    Medical clearance for psychiatric admission  Assessment & Plan  Will f/u with bp in am    Elevated BP without diagnosis of hypertension  Assessment & Plan  Controlled 2/3 - slight elevation this am, will f/u tomorrow  If continue to be elevated would start ace/arb      VTE Prophylaxis: per primary team     Recommendations for Discharge:  · Per primary team        History of Present Illness:    Oh Armendariz is a 38 y o  female who is originally admitted to the psychiatry service on 2/3/2020 due to schizoaffective d/o with acute exacerbation  We are consulted for medical clearance  Pt is examined today, c/o constant runny nose and PND with cough  Pt reports she has had this since her last admission and wants an antibiotic to make it go away  Pt denies sinus pressure or headaches  Afebrile  Review of Systems:    Review of Systems   Constitutional: Negative for activity change, appetite change, fatigue and fever  HENT: Positive for congestion, postnasal drip and rhinorrhea  Respiratory: Positive for cough  Negative for wheezing  Gastrointestinal: Negative for diarrhea, nausea and vomiting  Musculoskeletal: Negative for arthralgias and myalgias  Skin: Negative for rash  Neurological: Negative for dizziness, light-headedness and headaches     Psychiatric/Behavioral: Positive for decreased concentration  The patient is nervous/anxious  Past Medical and Surgical History:     Past Medical History:   Diagnosis Date    Schizoaffective disorder ([facility] )        History reviewed  No pertinent surgical history  Meds/Allergies:    all medications and allergies reviewed    Allergies: Allergies   Allergen Reactions    Risperidone Shortness Of Breath    Haloperidol Other (See Comments)     Tardive Dyskinesia         Social History:     Marital Status: Single    Substance Use History:   Social History     Substance and Sexual Activity   Alcohol Use No    Frequency: Never    Binge frequency: Never     Social History     Tobacco Use   Smoking Status Current Every Day Smoker    Packs/day: 3 00   Smokeless Tobacco Never Used   Tobacco Comment    Pt states 18 cigarettes per day     Social History     Substance and Sexual Activity   Drug Use No       Family History:    non-contributory    Physical Exam:     Vitals:   Blood Pressure: 159/89 (02/04/20 0821)  Pulse: 97 (02/04/20 0821)  Temperature: 98 °F (36 7 °C) (02/04/20 0326)  Temp Source: Temporal (02/04/20 0326)  Respirations: 16 (02/04/20 0821)  Height: 5' 3 5" (161 3 cm) (02/04/20 0326)  Weight - Scale: 84 4 kg (186 lb) (02/04/20 0326)  SpO2: 95 % (02/04/20 0326)    Physical Exam   Constitutional: She appears well-nourished  HENT:   Head: Normocephalic and atraumatic  Nose: Nose normal    Eyes: EOM are normal  Right eye exhibits no discharge  Left eye exhibits no discharge  Neck: Normal range of motion  Neck supple  Cardiovascular: Regular rhythm and normal heart sounds  Slightly elevated this am, will follow in am    Pulmonary/Chest: Effort normal and breath sounds normal  She has no wheezes  Musculoskeletal: Normal range of motion  She exhibits no edema or deformity  Neurological: She is alert  Skin: Skin is warm and dry     Psychiatric:   Flat affect, pt responds appropriately to questions    Vitals reviewed  Additional Data:     Lab Results: I have personally reviewed pertinent reports  Results from last 7 days   Lab Units 02/04/20  0609   WBC Thousand/uL 11 10*   HEMOGLOBIN g/dL 13 9   HEMATOCRIT % 41 4   PLATELETS Thousands/uL 347   NEUTROS PCT % 72*   LYMPHS PCT % 19*   MONOS PCT % 6   EOS PCT % 3     Results from last 7 days   Lab Units 02/04/20  0609   POTASSIUM mmol/L 3 6   CHLORIDE mmol/L 103   CO2 mmol/L 24   BUN mg/dL 4*   CREATININE mg/dL 0 67   CALCIUM mg/dL 8 7   ALK PHOS U/L 79   ALT U/L 21   AST U/L 19           Imaging: I have personally reviewed pertinent reports  No results found  EKG, Pathology, and Other Studies Reviewed on Admission:   · EKG: not available at time of consultation     M*Peer[employer] software was used to dictate this note  It may contain errors with dictating incorrect words or incorrect spelling  Please contact the provider directly with any questions

## 2020-02-04 NOTE — PROGRESS NOTES
02/04/20 0800   Team Meeting   Meeting Type Daily Rounds   Team Members Present   Team Members Present Physician;Nurse;;; Other (Discipline and Name)   Physician Team Member [id number] Team Member Gaurang   Social Work Team Member Dinorah   Other (Discipline and Name) PEYTON Rodriguez RN psych resident, 2 medical students   Patient/Family Present   Patient Present No   Patient's Family Present No     New admission-placed under Dr Bales [facility] service

## 2020-02-04 NOTE — ED PROVIDER NOTES
History  Chief Complaint   Patient presents with    Psychiatric Evaluation     People are mad at me because I took a picture of a UFO  I was at [address] and they are mad at me for taking pictures  Patient rambling on      37 y/o AA female c/o feeling suicidal (no specific plan) for "days" - involved in [facility] transition program   Patient states that she is compliant with medications and denies all/any alcohol and illicit drugs  She was taking pictures of her residential area tonight and neighbors became suspicious  She was recently a 201 admit here on  for SI, depression, and schizoaffective disorder  Prior to Admission Medications   Prescriptions Last Dose Informant Patient Reported? Taking?   carbamide peroxide (DEBROX) 6 5 % otic solution   No No   Sig: Administer 5 drops into the left ear 2 (two) times a day   fluticasone (FLONASE) 50 mcg/act nasal spray   No No   Si spray into each nostril daily   paliperidone (INVEGA) 6 MG 24 hr tablet   No No   Sig: Take 1 tablet (6 mg total) by mouth daily   paliperidone palmitate ER (INVEGA) 234 mg/1 5 mL IM injection   No No   Sig: Inject 1 5 mL (234 mg total) into a muscle every 30 (thirty) days   sodium chloride (OCEAN) 0 65 % nasal spray   No No   Si spray into each nostril every hour as needed for congestion      Facility-Administered Medications: None       Past Medical History:   Diagnosis Date    Schizoaffective disorder ([facility] )        History reviewed  No pertinent surgical history  Family History   Family history unknown: Yes     I have reviewed and agree with the history as documented      Social History     Tobacco Use    Smoking status: Current Every Day Smoker     Packs/day: 3 00    Smokeless tobacco: Never Used    Tobacco comment: Pt states 18 cigarettes per day   Substance Use Topics    Alcohol use: No     Frequency: Never     Binge frequency: Never    Drug use: No        Review of Systems Psychiatric/Behavioral: Positive for suicidal ideas  The patient is nervous/anxious  All other systems reviewed and are negative  Physical Exam  Physical Exam   Constitutional: She is oriented to person, place, and time  She appears well-developed and well-nourished  HENT:   Head: Normocephalic and atraumatic  Right Ear: External ear normal    Left Ear: External ear normal    Mouth/Throat: Oropharynx is clear and moist    Eyes: Pupils are equal, round, and reactive to light  Conjunctivae and EOM are normal    Neck: Normal range of motion  Neck supple  Cardiovascular: Normal rate, regular rhythm and normal heart sounds  Pulmonary/Chest: Effort normal and breath sounds normal    Abdominal: Soft  Bowel sounds are normal    Musculoskeletal: Normal range of motion  Neurological: She is alert and oriented to person, place, and time  Skin: Skin is warm  Capillary refill takes less than 2 seconds  Psychiatric: She has a normal mood and affect  Vitals reviewed        Vital Signs  ED Triage Vitals [02/03/20 2241]   Temperature Pulse Respirations Blood Pressure SpO2   97 6 °F (36 4 °C) 104 16 140/93 97 %      Temp Source Heart Rate Source Patient Position - Orthostatic VS BP Location FiO2 (%)   Tympanic Monitor Sitting Left arm --      Pain Score       No Pain           Vitals:    02/03/20 2241   BP: 140/93   Pulse: 104   Patient Position - Orthostatic VS: Sitting         Visual Acuity      ED Medications  Medications   LORazepam (ATIVAN) tablet 1 mg (1 mg Oral Given 2/3/20 0239)       Diagnostic Studies  Results Reviewed     Procedure Component Value Units Date/Time    Rapid drug screen, urine [[id number]]  (Normal) Collected:  02/03/20 2322    Lab Status:  Final result Specimen:  Urine, Clean Catch Updated:  02/03/20 5398     Amph/Meth UR Negative     Barbiturate Ur Negative     Benzodiazepine Urine Negative     Cocaine Urine Negative     Methadone Urine Negative     Opiate Urine Negative     PCP Ur Negative     THC Urine Negative    Narrative:       FOR MEDICAL PURPOSES ONLY  IF CONFIRMATION NEEDED PLEASE CONTACT THE LAB WITHIN 5 DAYS  Drug Screen Cutoff Levels:  AMPHETAMINE/METHAMPHETAMINES  1000 ng/mL  BARBITURATES     200 ng/mL  BENZODIAZEPINES     200 ng/mL  COCAINE      300 ng/mL  METHADONE      300 ng/mL  OPIATES      300 ng/mL  PHENCYCLIDINE     25 ng/mL  THC       50 ng/mL      POCT pregnancy, urine [[id number]]  (Normal) Resulted:  02/03/20 2327    Lab Status:  Final result Updated:  02/03/20 2328     EXT PREG TEST UR (Ref: Negative) negative     Control valid    POCT alcohol breath test [[id number]]  (Normal) Resulted:  02/03/20 2327    Lab Status:  Final result Updated:  02/03/20 2327     EXTBreath Alcohol 0 000                 No orders to display              Procedures  Procedures         ED Course  ED Course as of Feb 04 0252   [facility] Feb 03, 2020   2341 201 signed  MDM      Disposition  Final diagnoses:   Suicidal ideations   Schizoaffective disorder ([facility] )     Time reflects when diagnosis was documented in both MDM as applicable and the Disposition within this note     Time User Action Codes Description Comment    2/3/2020 11:43 PM Niraj Galvan Add [A23 744] Suicidal ideations     2/3/2020 11:43 PM Niraj Galvan Add [F25 9] Schizoaffective disorder [facility])       ED Disposition     ED Disposition Condition Date/Time Comment    Transfer to [address] [place] [address] Feb 4, 2020  2:52 AM Etienne Leo should be transferred out to  and has been medically cleared  Follow-up Information    None         Patient's Medications   Discharge Prescriptions    No medications on file     No discharge procedures on file      ED Provider  Electronically Signed by           Fan Rojas DO  02/04/20 1407

## 2020-02-04 NOTE — PROGRESS NOTES
02/04/20 1100   Activity/Group Checklist   Group   (recovery group)   Attendance   (came late)   Attendance Duration (min) 16-30   Interactions Interacted appropriately   Affect/Mood Blunted/flat   Goals Achieved Identified distorted thoughts/beliefs; Able to listen to others; Able to engage in interactions; Able to self-disclose

## 2020-02-04 NOTE — CASE MANAGEMENT
Case Management Readmission Assessment    Current Admission Date:  2/3/2020 Previous Admission - Discharge Date:  12/31/19 Number of Days Between Admissions:   34   Current Admission Diagnosis:  Patient Active Problem List   Diagnosis    Schizoaffective disorder, chronic condition with acute exacerbation ([facility]    Elevated BP without diagnosis of hypertension    Tobacco abuse    Medical clearance for psychiatric admission    Previous Discharge Diagnosis:   Has the patient had more than one readmission in the past 90 days? no      Assess for all Readmissions:    1  Where were you prior to coming to the hospital for this admission? [facility] [place]   2  What do you feel caused you to come back into the hospital again? paranoia   3  Did you contact your physician prior to coming to the hospital?  No           4  Did the physician direct you to come to the Emergency Department? No   5  Were all of your needs addressed before you left the hospital? Pt is delusional at this time and did not follow through with referral upon DC           Assess for Readmissions from Home, Home with Home Health or AL/PCF/Group Home:    6  Did you have an appointment with your doctor after you were discharged? Yes   7  Were you able to go to the appointment as scheduled?            a   If no, why not? No     Too ill   8  Were your Discharge Instructions easy to understand? Yes   9  Were you able to get all of your medications?            a   If no, why not? Yes               b   Did you take your medications as they were prescribed?            c,  If no, why not? Yes               d   Did you understand the purpose for taking each of your medications? Yes   10  Did you understand how to care for yourself and who to call if you had a                      problem?    Yes     HEATHER Velasquez         February 4, 2020

## 2020-02-04 NOTE — PROGRESS NOTES
02/04/20 1200   Service   Service   [facility])   Provider Name [facility]   Multi-disciplinary Rounds   Diagnosis  Reviewed   Level of care Current level of care is appropriate     Consult for medical clearance completed

## 2020-02-04 NOTE — ED NOTES
Pt placed into hospital bed and made comfortable  Pt provided water  Pt watching tv and resting comfortably with no further needs  1:1 remains in effect          Carl Mckenzie RN  02/04/20 1112

## 2020-02-04 NOTE — PROGRESS NOTES
Patient refused EKG  "I'm not here for experimentation, I'm here for my mental health   There's nothing physically wrong with me and when people volunteer - and there are some people here for that -  they end up having to die and I'm not trying to die "

## 2020-02-04 NOTE — ED NOTES
Insurance Authorization for admission:   Phone call placed to [facility]  Phone number: [phone]  Spoke to Arnaldo Arana  Follow medicare days approved  Level of care: inpatient mental health 201  Authorization # poonam Villarreal at discharge    [address] forms completed  Originals on patients chart

## 2020-02-04 NOTE — ED NOTES
Patient is accepted at [place] [address]  Patient is accepted by Dr Tommy Lo per michael     Patient may go to the floor once 1:1 orders are in from michael  Nurse report is to be called to [phone] prior to patient transfer

## 2020-02-04 NOTE — ED NOTES
Crisis Worker left voice message on moms cell phone to make her aware patient would be moved to the unit overnight   Mom was given crisis desk number to get more contact information for the unit prior to arrival

## 2020-02-04 NOTE — PLAN OF CARE
Problem: Alteration in Thoughts and Perception  Goal: Verbalize thoughts and feelings  Description  Interventions:  - Promote a nonjudgmental and trusting relationship with the patient through active listening and therapeutic communication  - Assess patient's level of functioning, behavior and potential for risk  - Engage patient in 1 on 1 interactions  - Encourage patient to express fears, feelings, frustrations, and discuss symptoms    - Marissa patient to reality, help patient recognize reality-based thinking   - Administer medications as ordered and assess for potential side effects  - Provide the patient education related to the signs and symptoms of the illness and desired effects of prescribed medications  Outcome: Progressing  Goal: Refrain from acting on delusional thinking/internal stimuli  Description  Interventions:  - Monitor patient closely, per order   - Utilize least restrictive measures   - Set reasonable limits, give positive feedback for acceptable   - Administer medications as ordered and monitor of potential side effects  Outcome: Progressing  Goal: Agree to be compliant with medication regime, as prescribed and report medication side effects  Description  Interventions:  - Offer appropriate PRN medication and supervise ingestion; conduct AIMS, as needed   Outcome: Progressing  Goal: Attend and participate in unit activities, including therapeutic, recreational, and educational groups  Description  Interventions:  -Encourage Visitation and family involvement in care  Outcome: Progressing     Problem: Alteration in Thoughts and Perception  Goal: Treatment Goal: Gain control of psychotic behaviors/thinking, reduce/eliminate presenting symptoms and demonstrate improved reality functioning upon discharge  Outcome: Not Progressing  Goal: Recognize dysfunctional thoughts, communicate reality-based thoughts at the time of discharge  Description  Interventions:  - Provide medication and psycho-education to assist patient in compliance and developing insight into his/her illness   Outcome: Not Progressing
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
## 2020-02-04 NOTE — DISCHARGE INSTR - OTHER ORDERS
[facility] Crisis Phone Number : [phone]    [facility] Crisis Phone Number : [phone]    National Suicide Hotline : 1 189.753.7128    Crisis Text Line : Marci 34 is a toll-free telephone number for people in [facility] who are seeking a listening ear for additional support in their recovery from mental illness  The PEER LINE is peer-run and peer-friendly  Callers to the [address] will speak directly to other individuals who have experienced the mental health recovery process  The PEER LINE staff possess these three core values to assist and support the PEER LINE caller:  Acceptance   [address] [place] [address]  Promote individual recovery and strengthen communities  Funding Information  This project is funded, in part, under contract with [facility], through funds provided by the [employer]  Recovery Partnership has always promoted, used, and remains faithful to procedure and language that reflects recovery-based and person-first principles  Recovery [facility] [employer]   You can call the Peer Line 24 hours a day  Phone: Reese Westfall  ([phone])   [address] [place] [address]     The [address] [place] [address] is a unique social rehabilitation program  It is a place to come and relax and have some fun, meet new people and chat with friends, while having a snack and taking part in the various activities of the [facility]  Each month we invite guest speakers who provide education on various topics of interest  Together, we publish a bi-monthly newsletter that contains our monthly activity calendars  We support one another and share life experiences in recovery  We have a van run daily for those who are unable to get to and from the [address] [place] [address] on their own  If you require transportation, please call before 3 PM on the day transportation is needed for   Come join us and have some fun  Some of the outings we offer are: bowling, miniature golf, going out to eat at different restaurants, and shopping at different thrift stores in the neighboring community  At the [address], [address], we have holiday parties, Oren Durant, workshops and discussion groups on various topics     Friends of the Count includes the [facility] are active participants in [address] [place] [address], from our [employer] to our newsletter and volunteer program      All are welcome to share ideas, goals, and/or objectives that might be of interest to friends attending the   Count includes the [facility]  A Casual Week At The Count includes the [facility]:     Monday  · Community Outings  · Speakers  Tuesday  · [employer] and Hector on alternate weeks  Wednesday  · Karaoke  · Games  Thursday  · Bingo  · Arts and Crafts  Friday  · Movie and [employer]  · Monthly Birthday Parties  Recovery Partnership  [address]  [facility]  [phone]  Hours  Monday-Friday 12pm- 8pm    Naman "[employer] transportation provided Tuesday through Friday  Safe [address] [place], [place] [address] [id number] [facility]    December 1, 2019- March 31, 2020 Saturday- Wednesday Nights  Shelter Site: [facility]  sil [place]a [place] , [address] [facility] [place]    Opens: 8pm    Curfew: 10pm (no in/out past this time)  Lights out: 11pm  Wake up call: 6am  Clean up: 6a-7a  Closes: 7a    Additional Services Provided    Hot meal served/snacks, hot beverages, hygiene kits, clothing closet, case management available, referrals to [facility]/211, additional day program hours 9a-3p M-F, 10:30a-1p on Sunday  VISIBLY INTOXICATED PERSONS WILL NOT BE PERMITTED AND WILL BE TRANSPORTED TO [facility]    Contact: [phone]  [email or web address]  Business Hours ext 251/ After-hours ext [phone] Thursday and Friday Nights    Shelter Site: [facility]  [address], [address] [place] [address] [address]     at [facility] at [facility] 2[address] up at [facility] at [facility]: 10 pm (no in/out past this time)  Wake up call: 6am  Closes: 7a    Additional Services Provided  [employer] served, prayer service, referrals to [place], Adventist services at [facility] and 10:30am on Sundays, referrals to [facility]/211, No in/out allowed after entry  The [facility] Family-to-Family Education Program is a free 12-week (2 1/2 hours/week) course for families of individuals with severe brain disorders (mental illnesses)  The classes are taught by trained family members  All course materials are furnished at no cost to you  Below are some details  To register, e-mail [email or web address] or call [phone]  The curriculum focuses on schizophrenia, bipolar disorder (manic depression), clinical depression, panic disorders and obsessive-compulsive disorder (OCD)  The course discusses the clinical treatment of these illnesses and teaches the knowledge and skills that family members need to cope more effectively    Topics Include:  Learning about feelings, learning about facts   Schizophrenia, major depression and damian: diagnosis and dealing with critical periods   Subtypes of depression and bipolar disorder, panic disorder and OCD; diagnosis and causes; sharing our stories   The biology of the brain/new research   Problem solving workshops   Medication review   Empathy workshop  what its like to have a brain disorder   Communication skills workshop   Self-care and relative groups   Rehabilitation, services available   Advocacy: fighting stigma   Review and certification ceremony    Rbyn-zq-Raxz Education Course  The [employer] Education class is a ten week  two hours per week  experiential education course on the topic of recovery for any person with serious mental illness who is interested in establishing and maintaining wellness  The course uses a combination of lecture, interactive exercises, and structural group processes  The diversity of experience among participants affords for a lively dynamic that moves the course along  LASHELLs Geth-fp-Zgjf Education class is offered free of charge to people who experience mental illness  You do not need to be a member of LASHELL to take the course  Courses are taught by teams of trained mentors/peer-teachers who are themselves experienced at living well with mental illness  Below are some details  To register, call [phone] or e-mail [email or web address]  Sign up today! [address] [facility] group is for family members, caregivers, and loved ones of individuals living with the everyday challenges of mental illness  The leaders are family members in the same situation  Sessions take place in an intimate, confidential setting to allow families to share openly with each other  These support groups allow participants to learn from the experiences of other group members, share coping strategies, and offer each other encouragement and understanding  Beth Lovelace know that you are not alone  Drop inno registration is necessary  Here are the times and locations  [place]  Monthly: 3rd Monday, 7:00-8:30 pm  [facility]  [facility], [place]  Monthly: 4th Tuesday, 7:00-8:30 pm  [address] [facility]  Monthly: 1st Monday, 7:00-8:30 pm  [facility]  [address] [place] [address]         Monthly Support for Persons with Mental Illness  The Peer Support Group is a monthly meeting for individuals facing the challenges of recovering from severe and persistent mental illness   Depression, manic depression, schizophrenia, and general anxiety disorder are only a few of the diagnoses of individuals who have found a supportive place at our meetings  Our [place]  We are a fellowship of individuals who share a common goal of recovery and the ability to maintain mental and emotional stability  We help others and ourselves through sharing our experiences, strength and hope with each other  No matter how traumatic our past or how despairing our present may be, there is hope for a new day  Sessions take place in an intimate, confidential setting to allow individuals to share openly with each other  Corrinne Heckler know that you are not alone  Drop inno registration is necessary  Here are the times and locations  ANITHA  Monthly: 1st Monday, 7:00-8:30 pm  [address] [place] [address]  [address] [place], [place]   [email or web address]  Monthly: 3rd Monday, 7:00-8:30 pm  [facility] :  [facility] Jennifer Mobley 303  707 [facility], [facility]  PHONE : [phone]  HOURS : Friday THROUGH Monday 12:30 PM - 1:30 PM     [facility]  Megan Alexander 1460  PHONE: [phone]  HOURS Tuesday-Thursday NOON-1:00PM    Sunday BREAKFAST HOURS 8-9:30AM AT [facility]  ENTER ON THE LEFT OF THE Yazidism BUILDING OFF OF [facility] [address]  SOUP KITCHEN : [place]  [facility] MINISTRIES FOOD PANTRY AND KITCHEN  [address] [place], [address] [facility]  PHONE : [phone]  HOURS:Monday-Friday 8AM-4PM  FOOD PANTRY: Monday-Friday 10:30AM-11:30AM    [facility]  [address] [facility] [address] [place] [address], [address] [facility]  PHONE : [phone]  HOURS: Monday-Friday NOON-1:00PM AND Sunday 10:30AM-1:00PM     [facility] SOUP KITCHEN  [address]   [facility] 115  PHONE: [phone]  HOURS: Monday-Friday NOON-1PM

## 2020-02-04 NOTE — PLAN OF CARE
Problem: Risk for Self Injury/Neglect  Goal: Treatment Goal: Remain safe during length of stay, learn and adopt new coping skills, and be free of self-injurious ideation, impulses and acts at the time of discharge  Outcome: Not Progressing

## 2020-02-04 NOTE — TREATMENT PLAN
TREATMENT PLAN REVIEW - [address] [facility] 38 y o  1975 female MRN: [id number]    [address] [place] [address] [facility] Encounter: [id number]          Admit Date/Time:  2/3/2020 10:26 PM    Treatment Team: Attending Provider: Monie Read MD; Consulting Physician: Pierre Irene DO; Patient Care Assistant: Raffaele Roberson; Patient Care Technician: Tee Alaniz;  Patient Care Technician: Juan Pablo Sheppard; Registered Nurse: Katerina Petersen RN; Patient Care Technician: Sandra Gottlieb; : HEATHER Dowell; Patient Care Technician: Juan Oquendo; Resident: Dane Alto Holter, DO    Diagnosis: Principal Problem:    Schizoaffective disorder, chronic condition with acute exacerbation [facility])    Patient Strengths/Assets: ability for insight, cooperative, compliant with medication, good past treatment response, good physical health, good support system, motivation for treatment/growth, patient is on a voluntary commitment    Patient Barriers/Limitations: financial instability, lack of stable employment, poor insight, poor reasoning ability    Short Term Goals: decrease in depressive symptoms, decrease in paranoid thoughts, decrease in psychotic symptoms, decrease in suicidal thoughts, ability to stay safe on the unit, ability to stay free of restraints, improvement in insight, improvement in reasoning ability, increase in group attendance, increase in socialization with peers on the unit, acceptance of need for psychiatric treatment, acceptance of psychiatric medications    Long Term Goals: improvement in depression, resolution of depressive symptoms, free of suicidal thoughts, resolution of psychotic symptoms, improvement in reasoning ability, improved insight, acceptance of need for psychiatric medications, acceptance of need for psychiatric treatment, acceptance of need for psychiatric follow up after discharge, acceptance of psychiatric diagnosis, appropriate interaction with peers, appropriate interaction with family    Progress Towards Goals: starting psychiatric medications as prescribed, continue psychiatric medications as prescribed    Recommended Treatment: medication management, patient medication education, group therapy, milieu therapy, continued Behavioral Health psychiatric evaluation/assessment process    Treatment Frequency: daily medication monitoring, group and milieu therapy daily, monitoring through interdisciplinary rounds, monitoring through weekly patient care conferences    Expected Discharge Date:  TBD    Discharge Plan: discharge to [facility] transition program    Treatment Plan Created/Updated By: Gambia C Holter, DO

## 2020-02-04 NOTE — ED NOTES
EVS (Eligibility Verification System) Automated system indicates:    Promise:  Bib 40 ASSURE   Medicare A/B

## 2020-02-04 NOTE — ASSESSMENT & PLAN NOTE
Mild, wbc 12/19 9 3  Likely stress reaction, would recommend f/u with PCP [FreeTextEntry6] : Pt needs a vaccination

## 2020-02-04 NOTE — ED NOTES
Report was called by FRANCICSO Perry  Vital signs taken and charted  Transferred with all of her belongings       Ta Estevez RN  02/04/20 9758
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
## 2020-02-04 NOTE — PROGRESS NOTES
Pt is compliant, cooperative with staff, oriented and attentive toward treatment  Pt appears paranoid, did not want to sign paperwork this morning until she had a copy first, states she came in because people were after her and she was seeing UFOs  She denies hallucinations, states she feels safe here  Pt did say she has SI constantly, states she does not have a plan and contracted for safety  Pt states she learned this behavior from her mother who used to say "I want to die" all of the time    Pt states, "when things aren't going well, it's always an option "

## 2020-02-04 NOTE — ASSESSMENT & PLAN NOTE
Controlled 2/3 - slight elevation this am, will f/u tomorrow  If continue to be elevated would start ace/arb

## 2020-02-04 NOTE — PROGRESS NOTES
ARMSTRONG GROUP SESSION  Declined BRANDY  Stated that Hetal Arceoion is "For people ruled by by their emotions or people who do not think"  Student explained what DERS is and it's purpose to patient after patient's statement  02/04/20 1030   Activity/Group Checklist   Group Admission/Discharge   Attendance Attended   Attendance Duration (min) 0-15   Interactions Interacted appropriately   Affect/Mood Appropriate   Goals Achieved Identified feelings; Identified triggers

## 2020-02-05 PROCEDURE — 99233 SBSQ HOSP IP/OBS HIGH 50: CPT | Performed by: PSYCHIATRY & NEUROLOGY

## 2020-02-05 RX ORDER — NICOTINE 21 MG/24HR
14 PATCH, TRANSDERMAL 24 HOURS TRANSDERMAL DAILY
Status: DISCONTINUED | OUTPATIENT
Start: 2020-02-05 | End: 2020-02-05

## 2020-02-05 RX ORDER — PALIPERIDONE 6 MG/1
6 TABLET, EXTENDED RELEASE ORAL DAILY
Status: DISCONTINUED | OUTPATIENT
Start: 2020-02-06 | End: 2020-02-13

## 2020-02-05 RX ADMIN — LORATADINE 10 MG: 10 TABLET ORAL at 08:24

## 2020-02-05 RX ADMIN — FLUTICASONE PROPIONATE 1 SPRAY: 50 SPRAY, METERED NASAL at 08:24

## 2020-02-05 RX ADMIN — PALIPERIDONE 9 MG: 6 TABLET, FILM COATED, EXTENDED RELEASE ORAL at 08:24
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
## 2020-02-05 NOTE — PROGRESS NOTES
02/05/20 0700   Team Meeting   Meeting Type Daily Rounds   Team Members Present   Team Members Present Physician;Nurse;; Other (Discipline and Name)   Physician Team Member Joe Webb   Nursing Team Member Toan   Care Management Team Member Stanley Mckee   Other (Discipline and Name) Michael RN,PEYTON [employer] psych resident, 2 medical students   Patient/Family Present   Patient Present No   Patient's Family Present No   Increased invega dose  Will continue to evaluate  Patient is on [place]   Next dose is due on 2-20-20

## 2020-02-05 NOTE — PROGRESS NOTES
02/05/20 0914   Team Meeting   Meeting Type Tx Team Meeting   Initial Conference Date 02/05/20   Next Conference Date 03/05/20   Team Members Present   Team Members Present Physician;Nurse;; Other (Discipline and Name)   Physician Team Member Dr Héctor Hill Team Member Dinorah   Other (Discipline and Name) Chandrakant ( resident)   Patient/Family Present   Patient Present Yes   Patient's Family Present No   Met with patient to discuss tx plan and to also sign tx plan
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
## 2020-02-05 NOTE — PROGRESS NOTES
ARMSTRONG GROUP SESSION  Participated in group session  Sat away from peers  Tangential during group discussion  02/05/20 1015   Activity/Group Checklist   Group Wellness  (Self-Love)   Attendance Attended   Attendance Duration (min) 16-30   Interactions Interacted appropriately   Affect/Mood Blunted/flat   Goals Achieved Able to listen to others; Able to engage in interactions

## 2020-02-05 NOTE — PROGRESS NOTES
Medications verified :  [address] ([phone]) and [facility] ([phone])   The only medication she has on her medication list from both pharmacy is Invega IM   Last dose of her Invega  mg was January 20th 2020

## 2020-02-05 NOTE — PLAN OF CARE
Problem: Alteration in Thoughts and Perception  Goal: Treatment Goal: Gain control of psychotic behaviors/thinking, reduce/eliminate presenting symptoms and demonstrate improved reality functioning upon discharge  Outcome: Progressing  Goal: Verbalize thoughts and feelings  Description  Interventions:  - Promote a nonjudgmental and trusting relationship with the patient through active listening and therapeutic communication  - Assess patient's level of functioning, behavior and potential for risk  - Engage patient in 1 on 1 interactions  - Encourage patient to express fears, feelings, frustrations, and discuss symptoms    - [place] patient to reality, help patient recognize reality-based thinking   - Administer medications as ordered and assess for potential side effects  - Provide the patient education related to the signs and symptoms of the illness and desired effects of prescribed medications  Outcome: Progressing  Goal: Refrain from acting on delusional thinking/internal stimuli  Description  Interventions:  - Monitor patient closely, per order   - Utilize least restrictive measures   - Set reasonable limits, give positive feedback for acceptable   - Administer medications as ordered and monitor of potential side effects  Outcome: Progressing  Goal: Agree to be compliant with medication regime, as prescribed and report medication side effects  Description  Interventions:  - Offer appropriate PRN medication and supervise ingestion; conduct AIMS, as needed   Outcome: Progressing  Goal: Attend and participate in unit activities, including therapeutic, recreational, and educational groups  Description  Interventions:  -Encourage Visitation and family involvement in care  Outcome: Progressing  Goal: Recognize dysfunctional thoughts, communicate reality-based thoughts at the time of discharge  Description  Interventions:  - Provide medication and psycho-education to assist patient in compliance and developing insight into his/her illness   Outcome: Progressing     Problem: DISCHARGE PLANNING  Goal: Discharge to home or other facility with appropriate resources  Description  INTERVENTIONS:  - Identify barriers to discharge w/patient and caregiver  - Arrange for needed discharge resources and transportation as appropriate  - Identify discharge learning needs (meds, wound care, etc )  - Arrange for interpretive services to assist at discharge as needed  - Refer to Case Management Department for coordinating discharge planning if the patient needs post-hospital services based on physician/advanced practitioner order or complex needs related to functional status, cognitive ability, or social support system  Outcome: Progressing     Problem: Risk for Self Injury/Neglect  Goal: Treatment Goal: Remain safe during length of stay, learn and adopt new coping skills, and be free of self-injurious ideation, impulses and acts at the time of discharge  Outcome: Progressing     Problem: Ineffective Coping  Goal: Participates in unit activities  Description  Interventions:  - Provide therapeutic environment   - Provide required programming   - Redirect inappropriate behaviors   Outcome: Progressing

## 2020-02-05 NOTE — PROGRESS NOTES
Progress Note - [address] VIR[address] Quijano 38 y o  female MRN: [id number]  Unit/Bed#: Denae Manrique 294-81 Encounter: [id number]    Assessment/Plan   Principal Problem:    Schizoaffective disorder, chronic condition with acute exacerbation ([address] )  Active Problems:    Elevated BP without diagnosis of hypertension    Medical clearance for psychiatric admission    Leukocytosis    Impaired fasting glucose    Chronic rhinitis       Decreased dose of Invega to 6 mg q d ; Invega injection by [facility] on 02/20/2020   Continue to promote patient participation in therapeutic milieu   Discharge TBD  Subjective:  Patient evaluated this a m  for continuity of care  No acute distress noted throughout evaluation; consents for safety  She denies suicidal/homicidal ideation  Patient denies depression and anxiety/irritability; she reiterates that "her species does not have emotions " She admits to medication regimen adherence and denies any acute adverse effects aside from mild sedation this a m  Patsi Reil Patient admits to appropriate appetite, states she is eating well  Patient admits she is sleeping well  Throughout evaluation, patient is paranoid and states she declined the EKG despite presentation of risks/benefits of antipsychotics because, "I do not want medical tests or experimentation throughout my hospitalization, maybe i'll find a black medical doctor after I am discharged, I do not necessarily want a  doctor"  She continues to be disorganized and distrustful, at times tangential and rambling      Current Medications:    Current Facility-Administered Medications:  acetaminophen 650 mg Oral Q6H PRN Alyssa Blair MD   aluminum-magnesium hydroxide-simethicone 30 mL Oral Q4H PRN Alyssa Blair MD   benztropine 1 mg Intramuscular Q6H PRN Ivania Henry MD   benztropine 1 mg Oral Q6H PRN Ivania Henry MD   fluticasone 1 spray Each Nare Daily Carlee Che PA-C   ibuprofen 600 mg Oral Q8H PRN Suresh Blair MD   loratadine 10 mg Oral Daily Carlee Che PA-C   LORazepam 2 mg Intramuscular Q6H PRN Ivania Henry MD   LORazepam 1 mg Oral Q8H PRN Ivania Henry MD   magnesium hydroxide 30 mL Oral Daily PRN Ivania Henry MD   nicotine 14 mg Transdermal Daily Borisia C Holter, DO   OLANZapine 5 mg Intramuscular BID PRN Ivania Henry MD   OLANZapine 5 mg Oral BID PRN Ivania Henry MD   paliperidone 9 mg Oral Daily [facility] Holter, DO   traZODone 50 mg Oral HS PRN Ivania Henry MD       Behavioral Health Medications:   all current active meds have been reviewed, continue current psychiatric medications and current meds:   Current Facility-Administered Medications   Medication Dose Route Frequency    acetaminophen (TYLENOL) tablet 650 mg  650 mg Oral Q6H PRN    aluminum-magnesium hydroxide-simethicone (MYLANTA) 200-200-20 mg/5 mL oral suspension 30 mL  30 mL Oral Q4H PRN    benztropine (COGENTIN) injection 1 mg  1 mg Intramuscular Q6H PRN    benztropine (COGENTIN) tablet 1 mg  1 mg Oral Q6H PRN    fluticasone (FLONASE) 50 mcg/act nasal spray 1 spray  1 spray Each Nare Daily    ibuprofen (MOTRIN) tablet 600 mg  600 mg Oral Q8H PRN    loratadine (CLARITIN) tablet 10 mg  10 mg Oral Daily    LORazepam (ATIVAN) injection 2 mg  2 mg Intramuscular Q6H PRN    LORazepam (ATIVAN) tablet 1 mg  1 mg Oral Q8H PRN    magnesium hydroxide (MILK OF MAGNESIA) 400 mg/5 mL oral suspension 30 mL  30 mL Oral Daily PRN    nicotine (NICODERM CQ) 14 mg/24hr TD 24 hr patch 14 mg  14 mg Transdermal Daily    OLANZapine (ZyPREXA) IM injection 5 mg  5 mg Intramuscular BID PRN    OLANZapine (ZyPREXA) tablet 5 mg  5 mg Oral BID PRN    paliperidone (INVEGA) 24 hr tablet 9 mg  9 mg Oral Daily    traZODone (DESYREL) tablet 50 mg  50 mg Oral HS PRN         Vital signs in last 24 hours:  Temp:  [97 9 °F (36 6 °C)-98 °F (36 7 °C)] 98 °F (36 7 °C)  HR:  [] 102  Resp:  [16] 16  BP: (136-140)/(70-95) 136/82    Laboratory results:    I have personally reviewed all pertinent laboratory/tests results    Most Recent Labs:   Lab Results   Component Value Date    WBC 11 10 (H) 02/04/2020    RBC 4 82 02/04/2020    HGB 13 9 02/04/2020    HCT 41 4 02/04/2020     02/04/2020    RDW 14 6 02/04/2020    NEUTROABS 8 00 (H) 02/04/2020    SODIUM 136 (L) 02/04/2020    K 3 6 02/04/2020     02/04/2020    CO2 24 02/04/2020    BUN 4 (L) 02/04/2020    CREATININE 0 67 02/04/2020    GLUC 111 (H) 02/04/2020    GLUF 111 (H) 02/04/2020    CALCIUM 8 7 02/04/2020    AST 19 02/04/2020    ALT 21 02/04/2020    ALKPHOS 79 02/04/2020    TP 7 4 02/04/2020    ALB 3 8 02/04/2020    TBILI 0 40 02/04/2020    CHOLESTEROL 180 02/04/2020    HDL 23 (L) 02/04/2020    TRIG 134 02/04/2020    LDLCALC 130 (H) 02/04/2020    NONHDLC 157 02/04/2020    [id number] 3 690 02/04/2020    RPR Non-Reactive 02/04/2020    HGBA1C 6 4 (H) 12/19/2019     12/19/2019     Admission Labs:   Admission on 02/03/2020   Component Date Value    Amph/Meth UR 02/03/2020 Negative     Barbiturate Ur 02/03/2020 Negative     Benzodiazepine Urine 02/03/2020 Negative     Cocaine Urine 02/03/2020 Negative     Methadone Urine 02/03/2020 Negative     Opiate Urine 02/03/2020 Negative     PCP Ur 02/03/2020 Negative     THC Urine 02/03/2020 Negative     EXT PREG TEST UR (Ref: N* 02/03/2020 negative     Control 02/03/2020 valid     EXTBreath Alcohol 02/03/2020 0 000     TSH 3RD GENERATON 02/04/2020 3 690     Color, UA 02/03/2020 Straw     Clarity, UA 02/03/2020 Clear     Specific Gravity, UA 02/03/2020 1 010     pH, UA 02/03/2020 6 0     Leukocytes, UA 02/03/2020 Negative     Nitrite, UA 02/03/2020 Negative     Protein, UA 02/03/2020 Negative     Glucose, UA 02/03/2020 Negative     Ketones, UA 02/03/2020 Negative     Bilirubin, UA 02/03/2020 Negative     Blood, UA 02/03/2020 Negative     UROBILINOGEN UA 02/03/2020 Negative     Cholesterol 02/04/2020 180     Triglycerides 02/04/2020 134     HDL, Direct 02/04/2020 23*    LDL Calculated 02/04/2020 130*    Non-HDL-Chol (CHOL-HDL) 02/04/2020 157     RPR 02/04/2020 Non-Reactive     Sodium 02/04/2020 136*    Potassium 02/04/2020 3 6     Chloride 02/04/2020 103     CO2 02/04/2020 24     ANION GAP 02/04/2020 9     BUN 02/04/2020 4*    Creatinine 02/04/2020 0 67     Glucose 02/04/2020 111*    Glucose, Fasting 02/04/2020 111*    Calcium 02/04/2020 8 7     AST 02/04/2020 19     ALT 02/04/2020 21     Alkaline Phosphatase 02/04/2020 79     Total Protein 02/04/2020 7 4     Albumin 02/04/2020 3 8     Total Bilirubin 02/04/2020 0 40     eGFR 02/04/2020 106     WBC 02/04/2020 11 10*    RBC 02/04/2020 4 82     Hemoglobin 02/04/2020 13 9     Hematocrit 02/04/2020 41 4     MCV 02/04/2020 86     MCH 02/04/2020 28 9     MCHC 02/04/2020 33 6     RDW 02/04/2020 14 6     MPV 02/04/2020 8 2*    Platelets 77/99/7733 347     Neutrophils Relative 02/04/2020 72*    Lymphocytes Relative 02/04/2020 19*    Monocytes Relative 02/04/2020 6     Eosinophils Relative 02/04/2020 3     Basophils Relative 02/04/2020 1     Neutrophils Absolute 02/04/2020 8 00*    Lymphocytes Absolute 02/04/2020 2 10     Monocytes Absolute 02/04/2020 0 60     Eosinophils Absolute 02/04/2020 0 40     Basophils Absolute 02/04/2020 0 10        Psychiatric Review of Systems:  Behavior over the last 24 hours:  Slight improvement  Sleep:  Improved  Appetite:  Improved  Medication side effects: No  ROS: no complaints    Mental Status Evaluation:  Appearance:  age appropriate, casually dressed and overweight   Behavior:  guarded and calm/cooperative   Speech:  normal pitch, normal volume and regular rate and rhythm   Mood:  Dysphoric   Affect:  constricted   Language naming objects and repeating phrases   Thought Process:  disorganized and tangential   Thought Content:  delusions  persecutory   Perceptual Disturbances: no presence of auditory visual hallucinations; no response to internal stimuli   Risk Potential: Suicidal Ideations none, Homicidal Ideations none and Potential for Aggression No   Sensorium:  person, place, time/date and situation   Cognition:  grossly intact   Consciousness:  alert and awake    Attention: attention span appeared shorter than expected for age   Insight:  limited   Judgment: limited   Intellect    Gait/Station: normal gait/station and normal balance   Motor Activity: no abnormal movements     Memory: Short and long term memory  fair     Progress Toward Goals:  Slight improvement    Recommended Treatment:   See above for assessment and plan  Continue following current medications:   Current Facility-Administered Medications:  acetaminophen 650 mg Oral Q6H PRN Eilleen Living, MD   aluminum-magnesium hydroxide-simethicone 30 mL Oral Q4H PRN Eilleen Living, MD   benztropine 1 mg Intramuscular Q6H PRN Eilleen Living, MD   benztropine 1 mg Oral Q6H PRN Eilleen Living, MD   fluticasone 1 spray Each Nare Daily Carlee Che PA-C   ibuprofen 600 mg Oral Q8H PRN Eilleen Living, MD   loratadine 10 mg Oral Daily Carlee Che PA-C   LORazepam 2 mg Intramuscular Q6H PRN Eilleen Living, MD   LORazepam 1 mg Oral Q8H PRN Eilleen Living, MD   magnesium hydroxide 30 mL Oral Daily PRN Eilleen Living, MD   nicotine 14 mg Transdermal Daily [place] C Holter, DO   OLANZapine 5 mg Intramuscular BID PRN Meri Blair MD   OLANZapine 5 mg Oral BID PRN Eilleen Living, MD   paliperidone 9 mg Oral Daily [place] C Holter, DO   traZODone 50 mg Oral HS PRN Eilleen MD [place]       Risks, benefits and possible side effects of Medications:   Risks, benefits, and possible side effects of medications explained to patient and patient verbalizes understanding  Risks of medications in pregnancy explained if female patient  Patient verbalizes understanding and agrees to notify her doctor if she becomes pregnant      This note has been constructed using a voice recognition system  There may be translation, syntax,  or grammatical errors  If you have any questions, please contact the dictating provider

## 2020-02-06 PROCEDURE — 99232 SBSQ HOSP IP/OBS MODERATE 35: CPT | Performed by: PSYCHIATRY & NEUROLOGY

## 2020-02-06 RX ADMIN — FLUTICASONE PROPIONATE 1 SPRAY: 50 SPRAY, METERED NASAL at 08:39

## 2020-02-06 RX ADMIN — LORATADINE 10 MG: 10 TABLET ORAL at 08:39

## 2020-02-06 RX ADMIN — PALIPERIDONE 6 MG: 6 TABLET, FILM COATED, EXTENDED RELEASE ORAL at 08:39

## 2020-02-06 NOTE — PLAN OF CARE
Problem: Alteration in Thoughts and Perception  Goal: Treatment Goal: Gain control of psychotic behaviors/thinking, reduce/eliminate presenting symptoms and demonstrate improved reality functioning upon discharge  Outcome: Progressing  Goal: Verbalize thoughts and feelings  Description  Interventions:  - Promote a nonjudgmental and trusting relationship with the patient through active listening and therapeutic communication  - Assess patient's level of functioning, behavior and potential for risk  - Engage patient in 1 on 1 interactions  - Encourage patient to express fears, feelings, frustrations, and discuss symptoms    - [place] patient to reality, help patient recognize reality-based thinking   - Administer medications as ordered and assess for potential side effects  - Provide the patient education related to the signs and symptoms of the illness and desired effects of prescribed medications  Outcome: Progressing  Goal: Refrain from acting on delusional thinking/internal stimuli  Description  Interventions:  - Monitor patient closely, per order   - Utilize least restrictive measures   - Set reasonable limits, give positive feedback for acceptable   - Administer medications as ordered and monitor of potential side effects  Outcome: Progressing  Goal: Agree to be compliant with medication regime, as prescribed and report medication side effects  Description  Interventions:  - Offer appropriate PRN medication and supervise ingestion; conduct AIMS, as needed   Outcome: Progressing  Goal: Attend and participate in unit activities, including therapeutic, recreational, and educational groups  Description  Interventions:  -Encourage Visitation and family involvement in care  Outcome: Progressing  Goal: Recognize dysfunctional thoughts, communicate reality-based thoughts at the time of discharge  Description  Interventions:  - Provide medication and psycho-education to assist patient in compliance and developing insight into his/her illness   Outcome: Progressing     Problem: DISCHARGE PLANNING  Goal: Discharge to home or other facility with appropriate resources  Description  INTERVENTIONS:  - Identify barriers to discharge w/patient and caregiver  - Arrange for needed discharge resources and transportation as appropriate  - Identify discharge learning needs (meds, wound care, etc )  - Arrange for interpretive services to assist at discharge as needed  - Refer to Case Management Department for coordinating discharge planning if the patient needs post-hospital services based on physician/advanced practitioner order or complex needs related to functional status, cognitive ability, or social support system  Outcome: Progressing     Problem: Risk for Self Injury/Neglect  Goal: Treatment Goal: Remain safe during length of stay, learn and adopt new coping skills, and be free of self-injurious ideation, impulses and acts at the time of discharge  Outcome: Progressing     Problem: Ineffective Coping  Goal: Participates in unit activities  Description  Interventions:  - Provide therapeutic environment   - Provide required programming   - Redirect inappropriate behaviors   Outcome: Progressing

## 2020-02-06 NOTE — PROGRESS NOTES
[facility] GROUP SESSION  Particapated in group session  02/06/20 0930   Activity/Group Checklist   Group Community meeting   Attendance Attended   Attendance Duration (min) 16-30   Interactions Interacted appropriately   Affect/Mood Other (Comment)  (Increase in affect)   Goals Achieved Able to listen to others; Able to engage in interactions

## 2020-02-06 NOTE — PROGRESS NOTES
02/05/20 1415   Activity/Group Checklist   Group Other (Comment)  ( )   Attendance Attended   Attendance Duration (min) Greater than 60   Interactions Interacted appropriately   Goals Achieved Able to listen to others; Able to engage in interactions; Able to recieve feedback; Able to give feedback to another  (Able to engage materials; full participation)

## 2020-02-06 NOTE — PROGRESS NOTES
Progress Note - [address] 38 y o  female MRN: [id number]   Unit/Bed#: Wilbert Alvarez 146-53 Encounter: [id number]      Subjective:  Patient's chart reviewed and case discussed with the treatment team   The patient was seen in the milieu today  She was more calm and pleasant today  She though still seems paranoid and stated that she does not want to have EKG done  She denies any auditory or visual hallucination  Reported her mood has been good  Denies any thoughts to harm herself or others  Denies any concern about being here on the unit  Did not endorsed any delusional ideation such as mention yesterday but still is paranoid about getting EKG done  She also refused vital signs last evening  The patient stated that she was taking Invega by mouth 3 mg at home  Yesterday pharmacy stated that the patient only is on [place] injection and no other medication was filled there but the patient stated that she had been taking Invega tablet 3 mg at home  The patient Kandi Giles does which was initially increased to 9 mg at admission was decreased to 6 again and will be continued on that at this time  She reports doing fine on medication and denied any side effects            ROS: no complaints, all other systems are negative    Mental Status Evaluation:    Appearance:  age appropriate, casually dressed   Behavior:  pleasant, calm   Speech:  normal rate, normal volume, normal pitch   Mood:  improved   Affect:  brighter   Thought Process:  linear   Associations: intact associations   Thought Content:  paranoid ideation   Perceptual Disturbances: denies auditory or visual hallucinations when asked   Risk Potential: Suicidal ideation - None  Homicidal ideation - None  Potential for aggression - No   Sensorium:  oriented to person, place and time/date   Memory:  recent and remote memory grossly intact   Consciousness:  alert and awake   Attention: attention span and concentration are age appropriate Insight:  limited   Judgment: limited   Gait/Station: normal gait/station   Motor Activity: no abnormal movements     Vital signs in last 24 hours:    Temp:  [97 7 °F (36 5 °C)-98 °F (36 7 °C)] 98 °F (36 7 °C)  HR:  [110-112] 110  Resp:  [16] 16  BP: (137-157)/(83-93) 137/83    Laboratory results:   I have personally reviewed all pertinent laboratory/tests results  Most Recent Labs:   Lab Results   Component Value Date    WBC 11 10 (H) 02/04/2020    RBC 4 82 02/04/2020    HGB 13 9 02/04/2020    HCT 41 4 02/04/2020     02/04/2020    RDW 14 6 02/04/2020    NEUTROABS 8 00 (H) 02/04/2020    SODIUM 136 (L) 02/04/2020    K 3 6 02/04/2020     02/04/2020    CO2 24 02/04/2020    BUN 4 (L) 02/04/2020    CREATININE 0 67 02/04/2020    GLUC 111 (H) 02/04/2020    GLUF 111 (H) 02/04/2020    CALCIUM 8 7 02/04/2020    AST 19 02/04/2020    ALT 21 02/04/2020    ALKPHOS 79 02/04/2020    TP 7 4 02/04/2020    ALB 3 8 02/04/2020    TBILI 0 40 02/04/2020    CHOLESTEROL 180 02/04/2020    HDL 23 (L) 02/04/2020    TRIG 134 02/04/2020    LDLCALC 130 (H) 02/04/2020    [place] 157 02/04/2020    [id number] 3 690 02/04/2020    RPR Non-Reactive 02/04/2020    HGBA1C 6 4 (H) 12/19/2019     12/19/2019       Progress Toward Goals: improving gradually    Assessment/Plan  patient mood seems stable though still has paranoid ideation  No SI or HI reported  Plan plan will be to continue with her current medication at this time with no changes  Will continue to monitor her for her mood, behavior, safety, sleep and response to meds  If the patient continues to improve and has stable place to stay after discharge then tentative plan for discharge will be middle to late next week    Principal Problem:    Schizoaffective disorder, chronic condition with acute exacerbation (HCC)  Active Problems:    Elevated BP without diagnosis of hypertension    Medical clearance for psychiatric admission    Leukocytosis    Impaired fasting glucose    Chronic rhinitis    Recommended Treatment:     Planned medication and treatment changes: All current active medications have been reviewed  Encourage group therapy, milieu therapy and occupational therapy  Behavioral Health checks every 7 minutes  Current medications:    Current Facility-Administered Medications:  acetaminophen 650 mg Oral Q6H PRN Suresh Blair MD   aluminum-magnesium hydroxide-simethicone 30 mL Oral Q4H PRN Meri Blair MD   benztropine 1 mg Intramuscular Q6H PRN Eilleen Living, MD   benztropine 1 mg Oral Q6H PRN Eilleen Living, MD   fluticasone 1 spray Each Nare Daily Carlee Che PA-C   ibuprofen 600 mg Oral Q8H PRN Brendeneen MD Jean [place]   loratadine 10 mg Oral Daily Carlee Che PA-C   LORazepam 2 mg Intramuscular Q6H PRN Meri Blair MD   LORazepam 1 mg Oral Q8H PRN Jelena Greenfield MD   magnesium hydroxide 30 mL Oral Daily PRN Meri Blair MD   OLANZapine 5 mg Intramuscular BID PRN Eilleen MD [place]   OLANZapine 5 mg Oral BID PRN Eilleen MD [place]   paliperidone 6 mg Oral Daily [place] C Holter, DO   traZODone 50 mg Oral HS PRN Jelena Greenfield MD       Risks / Benefits of Treatment:    Risks, benefits, and possible side effects of medications explained to patient and patient verbalizes understanding and agreement for treatment  Counseling / Coordination of Care:    Patient's progress discussed with staff in treatment team meeting  Medications, treatment progress and treatment plan reviewed with patient      Lesley Madrigal MD 02/06/20

## 2020-02-06 NOTE — PROGRESS NOTES
02/06/20 0700   Team Meeting   Meeting Type Daily Rounds   Team Members Present   Team Members Present Physician;Nurse;; Other (Discipline and Name)   Physician Team Member [address]   Nursing Team Member [facility] Management Team Member [place]   Other (Discipline and Name) Chandra[place]ant ( resident)   Patient/Family Present   Patient Present No   Patient's Family Present No     Med Management

## 2020-02-06 NOTE — PROGRESS NOTES
Patient continues to refuse EKG and she refused vital signs at 8pm this evening  She thinks these things because psychiatrists are trying to make money off her insurance  She continues paranoid and lacking insight into her need for treatment

## 2020-02-06 NOTE — PROGRESS NOTES
Pt is social and visible in the milieu  She is calm and pleasant today   She is still paranoid and refusing her EKG  She denies SI/HI  She is compliant with medications and treatment

## 2020-02-06 NOTE — PROGRESS NOTES
[facility] GROUP SESSION  Full active participation in group session  Pleasant  Ability to stay on topic is emerging  02/06/20 1100   Activity/Group Checklist   Group Wellness   Attendance Attended   Attendance Duration (min) 16-30   Interactions Interacted appropriately   Affect/Mood Other (Comment)  (Increase in affect)   Goals Achieved Able to listen to others; Able to engage in interactions
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
## 2020-02-07 LAB
ALBUMIN SERPL BCP-MCNC: 3.5 G/DL (ref 3–5.2)
ALP SERPL-CCNC: 65 U/L (ref 43–122)
ALT SERPL W P-5'-P-CCNC: 17 U/L (ref 9–52)
ANION GAP SERPL CALCULATED.3IONS-SCNC: 9 MMOL/L (ref 5–14)
AST SERPL W P-5'-P-CCNC: 14 U/L (ref 14–36)
BASOPHILS # BLD AUTO: 0 THOUSANDS/ΜL (ref 0–0.1)
BASOPHILS NFR BLD AUTO: 0 % (ref 0–1)
BILIRUB SERPL-MCNC: 0.3 MG/DL
BUN SERPL-MCNC: 8 MG/DL (ref 5–25)
CALCIUM SERPL-MCNC: 8.8 MG/DL (ref 8.4–10.2)
CHLORIDE SERPL-SCNC: 103 MMOL/L (ref 97–108)
CO2 SERPL-SCNC: 23 MMOL/L (ref 22–30)
CREAT SERPL-MCNC: 0.73 MG/DL (ref 0.6–1.2)
EOSINOPHIL # BLD AUTO: 0.4 THOUSAND/ΜL (ref 0–0.4)
EOSINOPHIL NFR BLD AUTO: 4 % (ref 0–6)
ERYTHROCYTE [DISTWIDTH] IN BLOOD BY AUTOMATED COUNT: 14.4 %
GFR SERPL CREATININE-BSD FRML MDRD: 100 ML/MIN/1.73SQ M
GLUCOSE P FAST SERPL-MCNC: 119 MG/DL (ref 70–99)
GLUCOSE SERPL-MCNC: 119 MG/DL (ref 70–99)
HCT VFR BLD AUTO: 40.7 % (ref 36–46)
HGB BLD-MCNC: 13.7 G/DL (ref 12–16)
LYMPHOCYTES # BLD AUTO: 1.7 THOUSANDS/ΜL (ref 0.5–4)
LYMPHOCYTES NFR BLD AUTO: 18 % (ref 25–45)
MCH RBC QN AUTO: 28.6 PG (ref 26–34)
MCHC RBC AUTO-ENTMCNC: 33.6 G/DL (ref 31–36)
MCV RBC AUTO: 85 FL (ref 80–100)
MONOCYTES # BLD AUTO: 0.6 THOUSAND/ΜL (ref 0.2–0.9)
MONOCYTES NFR BLD AUTO: 6 % (ref 1–10)
NEUTROPHILS # BLD AUTO: 7 THOUSANDS/ΜL (ref 1.8–7.8)
NEUTS SEG NFR BLD AUTO: 72 % (ref 45–65)
PLATELET # BLD AUTO: 328 THOUSANDS/UL (ref 150–450)
PMV BLD AUTO: 8.2 FL (ref 8.9–12.7)
POTASSIUM SERPL-SCNC: 4.1 MMOL/L (ref 3.6–5)
PROT SERPL-MCNC: 6.9 G/DL (ref 5.9–8.4)
RBC # BLD AUTO: 4.79 MILLION/UL (ref 4–5.2)
SODIUM SERPL-SCNC: 135 MMOL/L (ref 137–147)
WBC # BLD AUTO: 9.7 THOUSAND/UL (ref 4.5–11)

## 2020-02-07 PROCEDURE — 85025 COMPLETE CBC W/AUTO DIFF WBC: CPT | Performed by: PSYCHIATRY & NEUROLOGY

## 2020-02-07 PROCEDURE — 80053 COMPREHEN METABOLIC PANEL: CPT | Performed by: PSYCHIATRY & NEUROLOGY

## 2020-02-07 PROCEDURE — 99232 SBSQ HOSP IP/OBS MODERATE 35: CPT | Performed by: PSYCHIATRY & NEUROLOGY

## 2020-02-07 RX ADMIN — LORATADINE 10 MG: 10 TABLET ORAL at 08:23

## 2020-02-07 RX ADMIN — FLUTICASONE PROPIONATE 1 SPRAY: 50 SPRAY, METERED NASAL at 08:23

## 2020-02-07 RX ADMIN — PALIPERIDONE 6 MG: 6 TABLET, FILM COATED, EXTENDED RELEASE ORAL at 08:23

## 2020-02-07 NOTE — PLAN OF CARE
Problem: Alteration in Thoughts and Perception  Goal: Treatment Goal: Gain control of psychotic behaviors/thinking, reduce/eliminate presenting symptoms and demonstrate improved reality functioning upon discharge  Outcome: Progressing  Goal: Verbalize thoughts and feelings  Description  Interventions:  - Promote a nonjudgmental and trusting relationship with the patient through active listening and therapeutic communication  - Assess patient's level of functioning, behavior and potential for risk  - Engage patient in 1 on 1 interactions  - Encourage patient to express fears, feelings, frustrations, and discuss symptoms    - [place] patient to reality, help patient recognize reality-based thinking   - Administer medications as ordered and assess for potential side effects  - Provide the patient education related to the signs and symptoms of the illness and desired effects of prescribed medications  Outcome: Progressing  Goal: Refrain from acting on delusional thinking/internal stimuli  Description  Interventions:  - Monitor patient closely, per order   - Utilize least restrictive measures   - Set reasonable limits, give positive feedback for acceptable   - Administer medications as ordered and monitor of potential side effects  Outcome: Progressing  Goal: Agree to be compliant with medication regime, as prescribed and report medication side effects  Description  Interventions:  - Offer appropriate PRN medication and supervise ingestion; conduct AIMS, as needed   Outcome: Progressing  Goal: Attend and participate in unit activities, including therapeutic, recreational, and educational groups  Description  Interventions:  -Encourage Visitation and family involvement in care  Outcome: Progressing  Goal: Recognize dysfunctional thoughts, communicate reality-based thoughts at the time of discharge  Description  Interventions:  - Provide medication and psycho-education to assist patient in compliance and developing insight into his/her illness   Outcome: Progressing     Problem: DISCHARGE PLANNING  Goal: Discharge to home or other facility with appropriate resources  Description  INTERVENTIONS:  - Identify barriers to discharge w/patient and caregiver  - Arrange for needed discharge resources and transportation as appropriate  - Identify discharge learning needs (meds, wound care, etc )  - Arrange for interpretive services to assist at discharge as needed  - Refer to Case Management Department for coordinating discharge planning if the patient needs post-hospital services based on physician/advanced practitioner order or complex needs related to functional status, cognitive ability, or social support system  Outcome: Progressing     Problem: Risk for Self Injury/Neglect  Goal: Treatment Goal: Remain safe during length of stay, learn and adopt new coping skills, and be free of self-injurious ideation, impulses and acts at the time of discharge  Outcome: Progressing     Problem: Ineffective Coping  Goal: Participates in unit activities  Description  Interventions:  - Provide therapeutic environment   - Provide required programming   - Redirect inappropriate behaviors   Outcome: Progressing

## 2020-02-07 NOTE — PROGRESS NOTES
02/06/20 1415   Activity/Group Checklist   Group Other (Comment)  (Art Therapy/Exploring Empathy through Self, Open Discussion)   Attendance Attended   Attendance Duration (min) Greater than 60   Interactions Interacted appropriately   Affect/Mood Appropriate   Goals Achieved Able to listen to others; Able to engage in interactions; Able to recieve feedback; Able to give feedback to another  (Able to engage materials; full participation)

## 2020-02-07 NOTE — PROGRESS NOTES
Progress Note - [address] [facility] Samm 38 y o  female MRN: [id number]  Unit/Bed#: [facility] [place] 164-49 Encounter: [id number]    Assessment/Plan   Principal Problem:    Schizoaffective disorder, chronic condition with acute exacerbation ([address] )  Active Problems:    Elevated BP without diagnosis of hypertension    Medical clearance for psychiatric admission    Leukocytosis    Impaired fasting glucose    Chronic rhinitis     Continue to promote patient participation in therapeutic milieu   Continue Invega 6 mg q d  Aurora Tobar  Scheduled Invega Sustenna 234 mg on 02/20/2020   Discharge TBD  Subjective:  Patient evaluated this a m  for continuity of care  No acute distress noted throughout evaluation; consents for safety  She denies suicidal/homicidal ideation  Patient denies depressed and anxious/irritable mood  She describes her mood as, "okay"  Patient admits to medication regimen adherence and denies any acute adverse effects  She admits to appropriate appetite states she is eating well  Patient states she is sleeping well  Throughout evaluation, patient is less disorganized and her thought process is more logical and linear  Patient is paranoid regarding EKG/labs and declines EKG despite discussion on risks vs  benefits; amenable to EKG as aftercare      Current Medications:    Current Facility-Administered Medications:  acetaminophen 650 mg Oral Q6H PRN Steph Blair MD   aluminum-magnesium hydroxide-simethicone 30 mL Oral Q4H PRN Steph Blair MD   benztropine 1 mg Intramuscular Q6H PRN Rashmi MD Angelica   benztropine 1 mg Oral Q6H PRN Rashmi MD Angelica   fluticasone 1 spray Each Nare Daily Carlee Che PA-C   ibuprofen 600 mg Oral Q8H PRN Rashmi Dominguez MD   loratadine 10 mg Oral Daily Carlee Che PA-C   LORazepam 2 mg Intramuscular Q6H PRN Rashmi Dominguez MD   LORazepam 1 mg Oral Q8H PRN Rashmi MD Angelica   magnesium hydroxide 30 mL Oral Daily PRN Rashmi Dominguez MD   OLANZapine 5 mg Intramuscular BID PRN Tommy Lo MD   OLANZapine 5 mg Oral BID PRN Tommy Lo MD   paliperidone 6 mg Oral Daily [place] C Holter, DO   traZODone 50 mg Oral HS PRN Tommy Lo MD       Behavioral Health Medications:   all current active meds have been reviewed, continue current psychiatric medications and current meds:   Current Facility-Administered Medications   Medication Dose Route Frequency    acetaminophen (TYLENOL) tablet 650 mg  650 mg Oral Q6H PRN    aluminum-magnesium hydroxide-simethicone (MYLANTA) 200-200-20 mg/5 mL oral suspension 30 mL  30 mL Oral Q4H PRN    benztropine (COGENTIN) injection 1 mg  1 mg Intramuscular Q6H PRN    benztropine (COGENTIN) tablet 1 mg  1 mg Oral Q6H PRN    fluticasone (FLONASE) 50 mcg/act nasal spray 1 spray  1 spray Each Nare Daily    ibuprofen (MOTRIN) tablet 600 mg  600 mg Oral Q8H PRN    loratadine (CLARITIN) tablet 10 mg  10 mg Oral Daily    LORazepam (ATIVAN) injection 2 mg  2 mg Intramuscular Q6H PRN    LORazepam (ATIVAN) tablet 1 mg  1 mg Oral Q8H PRN    magnesium hydroxide (MILK OF MAGNESIA) 400 mg/5 mL oral suspension 30 mL  30 mL Oral Daily PRN    OLANZapine (ZyPREXA) IM injection 5 mg  5 mg Intramuscular BID PRN    OLANZapine (ZyPREXA) tablet 5 mg  5 mg Oral BID PRN    paliperidone (INVEGA) 24 hr tablet 6 mg  6 mg Oral Daily    traZODone (DESYREL) tablet 50 mg  50 mg Oral HS PRN     Vital signs in last 24 hours:  Temp:  [98 2 °F (36 8 °C)] 98 2 °F (36 8 °C)  HR:  [114] 114  Resp:  [16] 16  BP: (129)/(77) 129/77    Laboratory results:    I have personally reviewed all pertinent laboratory/tests results    Most Recent Labs:   Lab Results   Component Value Date    WBC 9 70 02/07/2020    RBC 4 79 02/07/2020    HGB 13 7 02/07/2020    HCT 40 7 02/07/2020     02/07/2020    RDW 14 4 02/07/2020    NEUTROABS 7 00 02/07/2020    SODIUM 135 (L) 02/07/2020    K 4 1 02/07/2020     02/07/2020    CO2 23 02/07/2020    BUN 8 02/07/2020 CREATININE 0 73 02/07/2020    GLUC 119 (H) 02/07/2020    GLUF 119 (H) 02/07/2020    CALCIUM 8 8 02/07/2020    AST 14 02/07/2020    ALT 17 02/07/2020    ALKPHOS 65 02/07/2020    TP 6 9 02/07/2020    ALB 3 5 02/07/2020    TBILI 0 30 02/07/2020    CHOLESTEROL 180 02/04/2020    HDL 23 (L) 02/04/2020    TRIG 134 02/04/2020    LDLCALC 130 (H) 02/04/2020    NONHDLC 157 02/04/2020    UIN5WPPMYLAK 3 690 02/04/2020    RPR Non-Reactive 02/04/2020    HGBA1C 6 4 (H) 12/19/2019     12/19/2019     Admission Labs:   Admission on 02/03/2020   Component Date Value    Amph/Meth UR 02/03/2020 Negative     Barbiturate Ur 02/03/2020 Negative     Benzodiazepine Urine 02/03/2020 Negative     Cocaine Urine 02/03/2020 Negative     Methadone Urine 02/03/2020 Negative     Opiate Urine 02/03/2020 Negative     PCP Ur 02/03/2020 Negative     THC Urine 02/03/2020 Negative     EXT PREG TEST UR (Ref: N* 02/03/2020 negative     Control 02/03/2020 valid     EXTBreath Alcohol 02/03/2020 0 000     TSH 3RD GENERATON 02/04/2020 3 690     Color, UA 02/03/2020 Straw     Clarity, UA 02/03/2020 Clear     Specific Gravity, UA 02/03/2020 1 010     pH, UA 02/03/2020 6 0     Leukocytes, UA 02/03/2020 Negative     Nitrite, UA 02/03/2020 Negative     Protein, UA 02/03/2020 Negative     Glucose, UA 02/03/2020 Negative     Ketones, UA 02/03/2020 Negative     Bilirubin, UA 02/03/2020 Negative     Blood, UA 02/03/2020 Negative     UROBILINOGEN UA 02/03/2020 Negative     Cholesterol 02/04/2020 180     Triglycerides 02/04/2020 134     HDL, Direct 02/04/2020 23*    LDL Calculated 02/04/2020 130*    Non-HDL-Chol (CHOL-HDL) 02/04/2020 157     RPR 02/04/2020 Non-Reactive     Sodium 02/04/2020 136*    Potassium 02/04/2020 3 6     Chloride 02/04/2020 103     CO2 02/04/2020 24     ANION GAP 02/04/2020 9     BUN 02/04/2020 4*    Creatinine 02/04/2020 0 67     Glucose 02/04/2020 111*    Glucose, Fasting 02/04/2020 111*    Calcium 02/04/2020 8 7     AST 02/04/2020 19     ALT 02/04/2020 21     Alkaline Phosphatase 02/04/2020 79     Total Protein 02/04/2020 7 4     Albumin 02/04/2020 3 8     Total Bilirubin 02/04/2020 0 40     eGFR 02/04/2020 106     WBC 02/04/2020 11 10*    RBC 02/04/2020 4 82     Hemoglobin 02/04/2020 13 9     Hematocrit 02/04/2020 41 4     MCV 02/04/2020 86     MCH 02/04/2020 28 9     MCHC 02/04/2020 33 6     RDW 02/04/2020 14 6     MPV 02/04/2020 8 2*    Platelets 82/17/1686 347     Neutrophils Relative 02/04/2020 72*    Lymphocytes Relative 02/04/2020 19*    Monocytes Relative 02/04/2020 6     Eosinophils Relative 02/04/2020 3     Basophils Relative 02/04/2020 1     Neutrophils Absolute 02/04/2020 8 00*    Lymphocytes Absolute 02/04/2020 2 10     Monocytes Absolute 02/04/2020 0 60     Eosinophils Absolute 02/04/2020 0 40     Basophils Absolute 02/04/2020 0 10     WBC 02/07/2020 9 70     RBC 02/07/2020 4 79     Hemoglobin 02/07/2020 13 7     Hematocrit 02/07/2020 40 7     MCV 02/07/2020 85     MCH 02/07/2020 28 6     MCHC 02/07/2020 33 6     RDW 02/07/2020 14 4     MPV 02/07/2020 8 2*    Platelets 66/72/0073 328     Neutrophils Relative 02/07/2020 72*    Lymphocytes Relative 02/07/2020 18*    Monocytes Relative 02/07/2020 6     Eosinophils Relative 02/07/2020 4     Basophils Relative 02/07/2020 0     Neutrophils Absolute 02/07/2020 7 00     Lymphocytes Absolute 02/07/2020 1 70     Monocytes Absolute 02/07/2020 0 60     Eosinophils Absolute 02/07/2020 0 40     Basophils Absolute 02/07/2020 0 00     Sodium 02/07/2020 135*    Potassium 02/07/2020 4 1     Chloride 02/07/2020 103     CO2 02/07/2020 23     ANION GAP 02/07/2020 9     BUN 02/07/2020 8     Creatinine 02/07/2020 0 73     Glucose 02/07/2020 119*    Glucose, Fasting 02/07/2020 119*    Calcium 02/07/2020 8 8     AST 02/07/2020 14     ALT 02/07/2020 17     Alkaline Phosphatase 02/07/2020 65     Total Protein 02/07/2020 6 9     Albumin 02/07/2020 3 5     Total Bilirubin 02/07/2020 0 30     eGFR 02/07/2020 100        Psychiatric Review of Systems:  Behavior over the last 24 hours:  Slight improvement  Sleep:  Improved  Appetite:  Improved  Medication side effects: No  ROS: no complaints    Mental Status Evaluation:  Appearance:  age appropriate, casually dressed and overweight   Behavior:  guarded and calm/cooperative   Speech:  normal pitch, normal volume and regular rate and rhythm; no paucity of speech   Mood:  dysphoric   Affect:  constricted   Language naming objects and repeating phrases   Thought Process:  slight disorganization  More logical/linear   Thought Content:  delusions  obsessive/rumination   Perceptual Disturbances: no auditory/visual hallucinations; no response to internal stimuli   Risk Potential: Suicidal Ideations none, Homicidal Ideations none and Potential for Aggression No   Sensorium:  person, place, day of week, month of year and year   Cognition:  grossly intact   Consciousness:  alert and awake    Attention: attention span appeared shorter than expected for age   Insight:  limited   Judgment: limited   Intellect    Gait/Station: normal gait/station and normal balance   Motor Activity: no abnormal movements     Memory: Short and long term memory fair     Progress Toward Goals:  Slight improvement    Recommended Treatment:   See above for assessment and plan       Continue following current medications:   Current Facility-Administered Medications:  acetaminophen 650 mg Oral Q6H PRN Wallace Blair MD   aluminum-magnesium hydroxide-simethicone 30 mL Oral Q4H PRN Wallace Blair MD   benztropine 1 mg Intramuscular Q6H PRN Emerita Cedeno MD   benztropine 1 mg Oral Q6H PRN Emerita Cedeno MD   fluticasone 1 spray Each Nare Daily Carlee Che PA-C   ibuprofen 600 mg Oral Q8H PRN Emerita Cedeno MD   loratadine 10 mg Oral Daily Carlee Che PA-C   LORazepam 2 mg Intramuscular Q6H PRN Armida Boeck, MD   LORazepam 1 mg Oral Q8H PRN Armida Boeck, MD   magnesium hydroxide 30 mL Oral Daily PRN Armida Boeck, MD   OLANZapine 5 mg Intramuscular BID PRN Armida Boeck, MD   OLANZapine 5 mg Oral BID PRN Armida Boeck, MD   paliperidone 6 mg Oral Daily [place] C Holter, DO   traZODone 50 mg Oral HS PRN Armida Boeck, MD       Risks, benefits and possible side effects of Medications:   Risks, benefits, and possible side effects of medications explained to patient and patient verbalizes understanding  Risks of medications in pregnancy explained if female patient  Patient verbalizes understanding and agrees to notify her doctor if she becomes pregnant  This note has been constructed using a voice recognition system  There may be translation, syntax,  or grammatical errors  If you have any questions, please contact the dictating provider

## 2020-02-07 NOTE — PROGRESS NOTES
[facility] NOTE     02/07/20 1000   Activity/Group Checklist   Group Other (Comment)  (LASHELL)   Attendance Attended   Attendance Duration (min) 31-45   Interactions Interacted appropriately   Affect/Mood Appropriate
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
## 2020-02-07 NOTE — PROGRESS NOTES
02/07/20 0700   Team Meeting   Meeting Type Daily Rounds   Team Members Present   Team Members Present Physician;Nurse;; Other (Discipline and Name)   Physician Team Member Rigoberto Willis 87 Team Member [facility] Management Team Member Padilla De La Paz   Social Work Team Member Navigator Nourse; Therapist    Other (Discipline and Name) Students   Patient/Family Present   Patient Present No   Patient's Family Present No     Med Management

## 2020-02-07 NOTE — PLAN OF CARE
Problem: Ineffective Coping  Goal: Participates in unit activities  Description  Interventions:  - Provide therapeutic environment   - Provide required programming   - Redirect inappropriate behaviors   Outcome: Progressing  Note:   Increase in ability to contribute to group discussion in an appropriate way

## 2020-02-07 NOTE — PROGRESS NOTES
02/07/20 1100   Activity/Group Checklist   Group   (recovery group)   Attendance Attended   Attendance Duration (min) 46-60   Interactions Interacted appropriately   Affect/Mood Appropriate   Goals Achieved Discussed self-esteem issues; Displayed empathy;Able to listen to others; Able to engage in interactions; Able to self-disclose; Able to recieve feedback; Able to give feedback to another   gratitude

## 2020-02-08 PROCEDURE — 99232 SBSQ HOSP IP/OBS MODERATE 35: CPT | Performed by: PSYCHIATRY & NEUROLOGY

## 2020-02-08 RX ADMIN — FLUTICASONE PROPIONATE 1 SPRAY: 50 SPRAY, METERED NASAL at 08:18

## 2020-02-08 RX ADMIN — LORATADINE 10 MG: 10 TABLET ORAL at 08:18

## 2020-02-08 RX ADMIN — PALIPERIDONE 6 MG: 6 TABLET, FILM COATED, EXTENDED RELEASE ORAL at 08:18

## 2020-02-08 NOTE — PROGRESS NOTES
About unit, attending groups and socializing with peers  Showered, doing laundry  Currently denying all symptoms but remains paranoid

## 2020-02-08 NOTE — PROGRESS NOTES
02/07/20 1415   Activity/Group Checklist   Group Other (Comment)  (Art Therapy Group/Therapy Sid, Process Discussion)   Attendance Attended   Attendance Duration (min) Greater than 60   Interactions Interacted appropriately   Affect/Mood Appropriate   Goals Achieved Identified feelings; Discussed coping strategies; Able to listen to others; Able to engage in interactions; Able to recieve feedback; Able to give feedback to another  (Able to engage materials; full participation)

## 2020-02-08 NOTE — PROGRESS NOTES
Progress Note - [address] Quijano 38 y o  female MRN: [id number]  Unit/Bed#: [facility] 562-32 Encounter: [id number]    The patient was seen for continuing care and reviewed with treatment team  Staff report patient has been calm, more visible on the unit  She continues to have paranoid delusions about the events leading up to admission but does not seem to have developed any new delusions  Her speech is pressured and is mildly disinhibited in our encounter  Mental Status Evaluation:  Appearance: adequate grooming, fair hygiene; no abnormal involuntary movements noted  Behavior: some diminished boundaries  Speech: pressured  Mood: euthymic  Affect: neutral, stable  Thought process: tangential; somewhat illogical  Thought content: paranoid delusions remain; denies SI/HI  Perceptual disturbances: denies Ah/VH  Cognition: oriented to all domains  Insight: limited  Judgement: poor    Progress Toward Goals: psychosis ongoing but behavior improved    Assessment/Plan    Principal Problem:    Schizoaffective disorder, chronic condition with acute exacerbation (HCC)  Active Problems:    Elevated BP without diagnosis of hypertension    Medical clearance for psychiatric admission    Leukocytosis    Impaired fasting glucose    Chronic rhinitis      Recommended Treatment: Continue with pharmacotherapy, group therapy, milieu therapy and occupational therapy    The patient will be maintained on the following medications:    Current Facility-Administered Medications:  acetaminophen 650 mg Oral Q6H PRN Tracy Blair MD   aluminum-magnesium hydroxide-simethicone 30 mL Oral Q4H PRN Tracy Blair MD   benztropine 1 mg Intramuscular Q6H PRN Margie Nova MD   benztropine 1 mg Oral Q6H PRN Margie Nova MD   fluticasone 1 spray Each Nare Daily Carlee Che PA-C   ibuprofen 600 mg Oral Q8H PRN Margie Nova MD   loratadine 10 mg Oral Daily Carlee Che PA-C   LORazepam 2 mg Intramuscular Q6H PRN Lori Méndez CYNTHIA Blair MD   LORazepam 1 mg Oral Q8H PRN Farida Blair MD   magnesium hydroxide 30 mL Oral Daily PRN Suresh Blair MD   OLANZapine 5 mg Intramuscular BID PRN Lalito Rivera MD   OLANZapine 5 mg Oral BID PRN Lalito Rivera MD   paliperidone 6 mg Oral Daily [place] C Holter, DO   traZODone 50 mg Oral HS PRN Lalito Rivera MD

## 2020-02-08 NOTE — PLAN OF CARE
Problem: Alteration in Thoughts and Perception  Goal: Treatment Goal: Gain control of psychotic behaviors/thinking, reduce/eliminate presenting symptoms and demonstrate improved reality functioning upon discharge  Outcome: Progressing  Goal: Verbalize thoughts and feelings  Description  Interventions:  - Promote a nonjudgmental and trusting relationship with the patient through active listening and therapeutic communication  - Assess patient's level of functioning, behavior and potential for risk  - Engage patient in 1 on 1 interactions  - Encourage patient to express fears, feelings, frustrations, and discuss symptoms    - [place] patient to reality, help patient recognize reality-based thinking   - Administer medications as ordered and assess for potential side effects  - Provide the patient education related to the signs and symptoms of the illness and desired effects of prescribed medications  Outcome: Progressing  Goal: Refrain from acting on delusional thinking/internal stimuli  Description  Interventions:  - Monitor patient closely, per order   - Utilize least restrictive measures   - Set reasonable limits, give positive feedback for acceptable   - Administer medications as ordered and monitor of potential side effects  Outcome: Progressing  Goal: Agree to be compliant with medication regime, as prescribed and report medication side effects  Description  Interventions:  - Offer appropriate PRN medication and supervise ingestion; conduct AIMS, as needed   Outcome: Progressing  Goal: Attend and participate in unit activities, including therapeutic, recreational, and educational groups  Description  Interventions:  -Encourage Visitation and family involvement in care  Outcome: Progressing  Goal: Recognize dysfunctional thoughts, communicate reality-based thoughts at the time of discharge  Description  Interventions:  - Provide medication and psycho-education to assist patient in compliance and developing insight into his/her illness   Outcome: Progressing     Problem: DISCHARGE PLANNING  Goal: Discharge to home or other facility with appropriate resources  Description  INTERVENTIONS:  - Identify barriers to discharge w/patient and caregiver  - Arrange for needed discharge resources and transportation as appropriate  - Identify discharge learning needs (meds, wound care, etc )  - Arrange for interpretive services to assist at discharge as needed  - Refer to Case Management Department for coordinating discharge planning if the patient needs post-hospital services based on physician/advanced practitioner order or complex needs related to functional status, cognitive ability, or social support system  Outcome: Progressing     Problem: Risk for Self Injury/Neglect  Goal: Treatment Goal: Remain safe during length of stay, learn and adopt new coping skills, and be free of self-injurious ideation, impulses and acts at the time of discharge  Outcome: Progressing     Problem: Ineffective Coping  Goal: Participates in unit activities  Description  Interventions:  - Provide therapeutic environment   - Provide required programming   - Redirect inappropriate behaviors   Outcome: Progressing

## 2020-02-08 NOTE — PROGRESS NOTES
Patient attends groups and is in the milieu for meals and needs  She is also a little more social wirh peers this evening  She is still voicing paranoid ideas however

## 2020-02-09 PROCEDURE — 99232 SBSQ HOSP IP/OBS MODERATE 35: CPT | Performed by: PSYCHIATRY & NEUROLOGY

## 2020-02-09 RX ADMIN — PALIPERIDONE 6 MG: 6 TABLET, FILM COATED, EXTENDED RELEASE ORAL at 08:15

## 2020-02-09 RX ADMIN — FLUTICASONE PROPIONATE 1 SPRAY: 50 SPRAY, METERED NASAL at 08:15

## 2020-02-09 RX ADMIN — LORATADINE 10 MG: 10 TABLET ORAL at 08:15

## 2020-02-09 NOTE — PROGRESS NOTES
Patient has been seclusive to room most of the shift  Mainly only out for meals  Scant, guarded and remains paranoid although pleasant  Will monitor

## 2020-02-09 NOTE — PROGRESS NOTES
Psychiatrist Progress Note - 14 Jennifer Quijano 38 y o  female MRN: [id number]  Unit/Bed#: [facility] 934-16 Encounter: [id number]    The patient was seen for continuing care and reviewed with treatment team  Patient is noted to be paranoid and llogical  She declines EKG but does not provide a logical explanation, stating "I held my breath during the blood pressure" and that "it's just congestion" as she points to her noise  Mental Status Evaluation:  Appearance:  Fair grooming and hygiene, no abnormal involuntary movements noted  Behavior:  Calm, superficially cooperative  Speech:   Within normal limits  Mood:  Denies complaints  Affect:  Neutral, stable  Thought process:  Illogical  Thought content:  Paranoid behavior remains  Perceptual disturbances:  Denies auditory visual hallucination  Cognition:  Adequately oriented    Insight:  Poor  Judgement:  Poor    Vitals:    02/08/20 0814 02/08/20 1545 02/09/20 0745 02/09/20 1525   BP: 115/71 120/71 118/75 117/72   BP Location: Left arm Left arm Left arm Left arm   Pulse: 95 100 100 87   Resp: 16 16 16 16   Temp: 97 8 °F (36 6 °C) 98 1 °F (36 7 °C) 98 2 °F (36 8 °C) 97 7 °F (36 5 °C)   TempSrc: Temporal Temporal Temporal Temporal   SpO2:       Weight: 84 6 kg (186 lb 6 4 oz)      Height:             Current Facility-Administered Medications:  acetaminophen 650 mg Oral Q6H PRN Suresh Blair MD   aluminum-magnesium hydroxide-simethicone 30 mL Oral Q4H PRN Ailyn Blair MD   benztropine 1 mg Intramuscular Q6H PRN Ailyn Blair MD   benztropine 1 mg Oral Q6H PRN Ailyn Blair MD   fluticasone 1 spray Each Nare Daily Carlee Che PA-C   ibuprofen 600 mg Oral Q8H PRN Aarti Chin MD   loratadine 10 mg Oral Daily Carlee Che PA-C   LORazepam 2 mg Intramuscular Q6H PRN Ailyn Blair MD   LORazepam 1 mg Oral Q8H PRN Ailyn Blair MD   magnesium hydroxide 30 mL Oral Daily PRN Suresh Blair MD   OLANZapine 5 mg Intramuscular BID PRN Donny Griffin MD   OLANZapine 5 mg Oral BID PRN Donny Griffin MD   paliperidone 6 mg Oral Daily [place] C Holter, DO   traZODone 50 mg Oral HS PRN Donny Griffin MD           Assessment/Plan    Principal Problem:    Schizoaffective disorder, chronic condition with acute exacerbation ([facility] [address] )  Active Problems:    Elevated BP without diagnosis of hypertension    Medical clearance for psychiatric admission    Leukocytosis    Impaired fasting glucose    Chronic rhinitis      Progress Toward Goals:  Limited    Recommended Treatment:   - continue current medications  - attempted to explain risks/benefits of refusing EKG but she does not seem to understand  - Continue with pharmacotherapy, group therapy, milieu therapy and occupational therapy

## 2020-02-09 NOTE — PROGRESS NOTES
02/08/20 1030   Activity/Group Checklist   Group Other (Comment)  (Surviving Trauma Group/Education, Open Discussion)   Attendance Attended   Attendance Duration (min) Greater than 60   Interactions Interacted appropriately   Affect/Mood Appropriate   Goals Achieved Able to listen to others; Able to engage in interactions; Able to recieve feedback; Able to give feedback to another

## 2020-02-09 NOTE — PROGRESS NOTES
Pt denies all symptoms  Pt is seclusive to room only out for meals  Pt appears scant and tired this evening  Pt has no complaints or concerns  Will monitor

## 2020-02-09 NOTE — PLAN OF CARE
Problem: Alteration in Thoughts and Perception  Goal: Treatment Goal: Gain control of psychotic behaviors/thinking, reduce/eliminate presenting symptoms and demonstrate improved reality functioning upon discharge  Outcome: Progressing  Goal: Verbalize thoughts and feelings  Description  Interventions:  - Promote a nonjudgmental and trusting relationship with the patient through active listening and therapeutic communication  - Assess patient's level of functioning, behavior and potential for risk  - Engage patient in 1 on 1 interactions  - Encourage patient to express fears, feelings, frustrations, and discuss symptoms    - [place] patient to reality, help patient recognize reality-based thinking   - Administer medications as ordered and assess for potential side effects  - Provide the patient education related to the signs and symptoms of the illness and desired effects of prescribed medications  Outcome: Progressing  Goal: Refrain from acting on delusional thinking/internal stimuli  Description  Interventions:  - Monitor patient closely, per order   - Utilize least restrictive measures   - Set reasonable limits, give positive feedback for acceptable   - Administer medications as ordered and monitor of potential side effects  Outcome: Progressing  Goal: Agree to be compliant with medication regime, as prescribed and report medication side effects  Description  Interventions:  - Offer appropriate PRN medication and supervise ingestion; conduct AIMS, as needed   Outcome: Progressing  Goal: Attend and participate in unit activities, including therapeutic, recreational, and educational groups  Description  Interventions:  -Encourage Visitation and family involvement in care  Outcome: Progressing  Goal: Recognize dysfunctional thoughts, communicate reality-based thoughts at the time of discharge  Description  Interventions:  - Provide medication and psycho-education to assist patient in compliance and developing insight into his/her illness   Outcome: Progressing     Problem: DISCHARGE PLANNING  Goal: Discharge to home or other facility with appropriate resources  Description  INTERVENTIONS:  - Identify barriers to discharge w/patient and caregiver  - Arrange for needed discharge resources and transportation as appropriate  - Identify discharge learning needs (meds, wound care, etc )  - Arrange for interpretive services to assist at discharge as needed  - Refer to Case Management Department for coordinating discharge planning if the patient needs post-hospital services based on physician/advanced practitioner order or complex needs related to functional status, cognitive ability, or social support system  Outcome: Progressing     Problem: Risk for Self Injury/Neglect  Goal: Treatment Goal: Remain safe during length of stay, learn and adopt new coping skills, and be free of self-injurious ideation, impulses and acts at the time of discharge  Outcome: Progressing     Problem: Ineffective Coping  Goal: Participates in unit activities  Description  Interventions:  - Provide therapeutic environment   - Provide required programming   - Redirect inappropriate behaviors   Outcome: Progressing

## 2020-02-10 PROCEDURE — 99232 SBSQ HOSP IP/OBS MODERATE 35: CPT | Performed by: PSYCHIATRY & NEUROLOGY

## 2020-02-10 RX ADMIN — LORATADINE 10 MG: 10 TABLET ORAL at 08:54

## 2020-02-10 RX ADMIN — FLUTICASONE PROPIONATE 1 SPRAY: 50 SPRAY, METERED NASAL at 08:55

## 2020-02-10 RX ADMIN — PALIPERIDONE 6 MG: 6 TABLET, FILM COATED, EXTENDED RELEASE ORAL at 08:54

## 2020-02-10 NOTE — CASE MANAGEMENT
[facility]  SW asked Pt if she has been in a group home before and she has  Pt has been in [facility], group home, hospitalized, in her own apartment, lived with her mother, and in shelters  Pt has not successfully remained in any of the placements due to her paranoia  Pt refused ICM or ACT team and stated they are a pain and just annoy her with their appointments  Pt stated she is grown and doesn't need "all of that"  Pt also stated her mother is retiring but her mother will help her with money, even if she says she is going to stop  Pt stated she is an only child, no father, and her grandparents are , so her mother will take care of her when it comes down to it  Pt stated her mother tries to baby her and does not believe she has mental health issues  Pt is in agreement with family meeting  SW will schedule family meeting

## 2020-02-10 NOTE — PROGRESS NOTES
Progress Note - 14 Jennifer Quijano 38 y o  female MRN: [id number]  Unit/Bed#: Jeramie Alaniz 251-36 Encounter: [id number]    The patient was seen for continuing care and reviewed with treatment team   Patient still experiencing delusional thinking during interview  Reports that she is not a [place] and communicates/prays to [employer]    Believes that doctors have ruined her life and were previously gang stalking her  Indicates that she feels better since admission as she has been in a good environment that is controlled  Reports that she made a [employer] for her mother and dog  States that her mother treats her dog as a family member  When asked about suicidality patient states I believe and supports suicide, it is part of my belief system," murders should kill themselves, and if I have problems in life I am going to kill myself    Indicates that she does not feel like harming herself at this point  Denies homicidal ideation or AVH  Patient continues to be tangental at times and switched topics during interview stating that she is not in a sexual relationship    Reports that groups have been helpful for her, particularly when they involve the  who she respects  Indicates that she has been on Invega since 2012 and has not experienced any side effects while inpatient  Indicates that she is sleeping well, 8 hours  Eating without difficulty  Patient's vital signs are stable at this time  Patient continues to refuse EKG  Patient was educated about what information this would provide and its indication  Patient still refuses  Per nursing staff patient is maintaining ADLs of showering etc   She has been seclusive to her room except for meals, scan, and guarded  Patient appears more bright on interview today and less guarded        Current Mental Status Evaluation:  Appearance:  Adequate hygiene and grooming, Good eye contact and Eccentric/bizarre   Behavior:  calm, cooperative, friendly and argumentative   Mood:  Denies complaints   Affect: constricted and mood-incongruent   Speech: Normal rate and Normal volume   Thought Process: Tangential and illogical   Thought Content:  Paranoid and mistrustful and Religiously preoccupied   Perceptual Disturbances: Denies hallucinations and does not appear to be responding to internal stimuli   Risk Potential: Suicidal Ideations without plan and Homicidal Ideations none   Orientation:   Alert and orient x3     Progress Toward Goals:   Principal Problem:    Schizoaffective disorder, chronic condition with acute exacerbation ([facility] [address] )  Active Problems:    Elevated BP without diagnosis of hypertension    Medical clearance for psychiatric admission    Leukocytosis    Impaired fasting glucose    Chronic rhinitis      Recommended Treatment: Continue with pharmacotherapy, group therapy, milieu therapy and occupational therapy  Patient continues to be delusional with passive SI  Tangental at times during interview  Indicates that she is participating in groups and finding these to be successful  Able to maintain ADLs at this time  Will continue to monitor for improvement  The patient will be maintained on the following medications:  Continue Invega 6 mg QD  Continue to monitor  Plan for tentative discharge back to [facility] later this week      Current Facility-Administered Medications:  acetaminophen 650 mg Oral Q6H PRN Yany Blair MD   aluminum-magnesium hydroxide-simethicone 30 mL Oral Q4H PRN Yany Blair MD   benztropine 1 mg Intramuscular Q6H PRN Tami Webster MD   benztropine 1 mg Oral Q6H PRN Tami Webster MD   fluticasone 1 spray Each Nare Daily Carlee Che PA-C   ibuprofen 600 mg Oral Q8H PRN Tami Webster MD   loratadine 10 mg Oral Daily Carlee Che PA-C   LORazepam 2 mg Intramuscular Q6H PRN Tami Webster MD   LORazepam 1 mg Oral Q8H PRN Tami Webster MD   magnesium hydroxide 30 mL Oral Daily PRN Suresh Blair MD   OLANZapine 5 mg Intramuscular BID PRN Andreea Dixon MD   OLANZapine 5 mg Oral BID PRN Andreea Dixon MD   paliperidone 6 mg Oral Daily [place] C Holter, DO   traZODone 50 mg Oral HS PRN Andreea Dixon MD

## 2020-02-10 NOTE — PROGRESS NOTES
02/10/20 1100   Activity/Group Checklist   Group   (recovery group)   Attendance Attended   Attendance Duration (min) 46-60   Interactions Interacted appropriately   Affect/Mood Appropriate   Goals Achieved Discussed self-esteem issues; Able to listen to others; Able to engage in interactions; Able to self-disclose; Able to recieve feedback

## 2020-02-10 NOTE — PROGRESS NOTES
02/10/20 0800   Team Meeting   Meeting Type Daily Rounds   Team Members Present   Team Members Present Physician;Nurse;; Other (Discipline and Name)   Physician Team Member [id number] Team Member [facility] Management Team Member Dinorah    Social Work Team Member Dinorah   Other (Discipline and Name) PEYTON Cifuentes   Patient/Family Present   Patient Present No   Patient's Family Present No     CM will find out where pt gets Invega from  Continue to monitor  Possible d/c this week

## 2020-02-10 NOTE — PROGRESS NOTES
Patient was cooperative with medications and scant and guarded in conversation this morning  Denied symptoms and needs

## 2020-02-11 PROCEDURE — 99232 SBSQ HOSP IP/OBS MODERATE 35: CPT | Performed by: PSYCHIATRY & NEUROLOGY

## 2020-02-11 RX ADMIN — FLUTICASONE PROPIONATE 1 SPRAY: 50 SPRAY, METERED NASAL at 08:25

## 2020-02-11 RX ADMIN — LORATADINE 10 MG: 10 TABLET ORAL at 08:25

## 2020-02-11 RX ADMIN — PALIPERIDONE 6 MG: 6 TABLET, FILM COATED, EXTENDED RELEASE ORAL at 08:25

## 2020-02-11 NOTE — PROGRESS NOTES
Minimal interaction with peers  Visible for most of the shift  Appears less suspicious  Compliant with medications

## 2020-02-11 NOTE — PROGRESS NOTES
02/11/20 1100   Activity/Group Checklist   Group   (recovery group)   Attendance Attended   Attendance Duration (min) 46-60   Interactions Interacted appropriately  (self absorbed, paranoid )   Affect/Mood Appropriate   Goals Achieved Able to listen to others; Able to engage in interactions; Able to self-disclose; Able to recieve feedback

## 2020-02-11 NOTE — PLAN OF CARE
Problem: Alteration in Thoughts and Perception  Goal: Treatment Goal: Gain control of psychotic behaviors/thinking, reduce/eliminate presenting symptoms and demonstrate improved reality functioning upon discharge  Outcome: Progressing  Goal: Verbalize thoughts and feelings  Description  Interventions:  - Promote a nonjudgmental and trusting relationship with the patient through active listening and therapeutic communication  - Assess patient's level of functioning, behavior and potential for risk  - Engage patient in 1 on 1 interactions  - Encourage patient to express fears, feelings, frustrations, and discuss symptoms    - [place] patient to reality, help patient recognize reality-based thinking   - Administer medications as ordered and assess for potential side effects  - Provide the patient education related to the signs and symptoms of the illness and desired effects of prescribed medications  Outcome: Progressing  Goal: Refrain from acting on delusional thinking/internal stimuli  Description  Interventions:  - Monitor patient closely, per order   - Utilize least restrictive measures   - Set reasonable limits, give positive feedback for acceptable   - Administer medications as ordered and monitor of potential side effects  Outcome: Progressing  Goal: Agree to be compliant with medication regime, as prescribed and report medication side effects  Description  Interventions:  - Offer appropriate PRN medication and supervise ingestion; conduct AIMS, as needed   Outcome: Progressing  Goal: Attend and participate in unit activities, including therapeutic, recreational, and educational groups  Description  Interventions:  -Encourage Visitation and family involvement in care  Outcome: Progressing  Goal: Recognize dysfunctional thoughts, communicate reality-based thoughts at the time of discharge  Description  Interventions:  - Provide medication and psycho-education to assist patient in compliance and developing insight into his/her illness   Outcome: Progressing     Problem: DISCHARGE PLANNING  Goal: Discharge to home or other facility with appropriate resources  Description  INTERVENTIONS:  - Identify barriers to discharge w/patient and caregiver  - Arrange for needed discharge resources and transportation as appropriate  - Identify discharge learning needs (meds, wound care, etc )  - Arrange for interpretive services to assist at discharge as needed  - Refer to Case Management Department for coordinating discharge planning if the patient needs post-hospital services based on physician/advanced practitioner order or complex needs related to functional status, cognitive ability, or social support system  Outcome: Progressing     Problem: Risk for Self Injury/Neglect  Goal: Treatment Goal: Remain safe during length of stay, learn and adopt new coping skills, and be free of self-injurious ideation, impulses and acts at the time of discharge  Outcome: Progressing     Problem: Ineffective Coping  Goal: Participates in unit activities  Description  Interventions:  - Provide therapeutic environment   - Provide required programming   - Redirect inappropriate behaviors   Outcome: Progressing

## 2020-02-11 NOTE — TREATMENT TEAM
02/11/20 0807   Team Meeting   Meeting Type Daily Rounds   Team Members Present   Team Members Present Physician;Nurse;; Other (Discipline and Name)   Physician Team Member Sydni   Nursing Team Member [facility] Management Team Member Raad   Other (Discipline and Name) Michael Leslie    Patient/Family Present   Patient Present No   Patient's Family Present No     Patient's mother coming in for family meeting on Wednesday  Possible discharge Friday

## 2020-02-11 NOTE — PROGRESS NOTES
Progress Note - [address] 38 y o  female MRN: [id number]  Unit/Bed#: [facility] 344-01 Encounter: [id number]    The patient was seen for continuing care and reviewed with treatment team   She does not offer any new complaints  She is careful not to verbalize anything delusional but later during the interview she does acknowledge about human trafficking and UFO is abducting people for that purpose  She indicates that she has photos that prove it  No EPS was noted    Current Mental Status Evaluation:  Appearance:  Adequate hygiene and grooming and Good eye contact   Behavior:  calm and cooperative   Mood:  euthymic   Affect: appropriate   Speech: Normal rate and Normal volume   Thought Process:  Goal directed and coherent   Thought Content:  Delusions of persecution and [place] delusions   Perceptual Disturbances: Uncertain   Risk Potential: No suicidal or homicidal ideation   Orientation:        Progress Toward Goals:  No significant events in the past 24 hours  Principal Problem:    Schizoaffective disorder, chronic condition with acute exacerbation ([facility] [address] )  Active Problems:    Elevated BP without diagnosis of hypertension    Medical clearance for psychiatric admission    Leukocytosis    Impaired fasting glucose    Chronic rhinitis      Recommended Treatment: Continue with pharmacotherapy, group therapy, milieu therapy and occupational therapy    The patient will be maintained on the following medications:    Current Facility-Administered Medications:  acetaminophen 650 mg Oral Q6H PRN Lidia Blair MD   aluminum-magnesium hydroxide-simethicone 30 mL Oral Q4H PRN Lidia Blair MD   benztropine 1 mg Intramuscular Q6H PRN Jazmin Sinha MD   benztropine 1 mg Oral Q6H PRN Jazmin Sinha MD   fluticasone 1 spray Each Nare Daily Carlee Che PA-C   ibuprofen 600 mg Oral Q8H PRN Jazmin Sinha MD   loratadine 10 mg Oral Daily Carlee Che PA-C   LORazepam 2 mg Intramuscular Q6H PRN Tommy Lo MD   LORazepam 1 mg Oral Q8H PRN Tommy Lo MD   magnesium hydroxide 30 mL Oral Daily PRN Suresh Blair MD   OLANZapine 5 mg Intramuscular BID PRN Tommy Lo MD   OLANZapine 5 mg Oral BID PRN Tommy Lo MD   paliperidone 6 mg Oral Daily [place] C Holter, DO   traZODone 50 mg Oral HS PRN Tommy Lo MD

## 2020-02-11 NOTE — PROGRESS NOTES
Patient has been in the milieu on and off this evening  She remains on the periphery  Occasional biblical references and she remains suspicious

## 2020-02-11 NOTE — CMS CERTIFICATION NOTE
Certification: Based upon physical, mental and social evaluations, I certify that inpatient psychiatric services are medically necessary for this patient for a duration of 4 midnights for the treatment of schizoaffective disorder  Available alternative community resources do not meet the patient's mental health care needs  I further attest that an established written individualized plan of care has been implemented and is outlined in the patient's medical records

## 2020-02-12 PROCEDURE — 93005 ELECTROCARDIOGRAM TRACING: CPT

## 2020-02-12 PROCEDURE — 99232 SBSQ HOSP IP/OBS MODERATE 35: CPT | Performed by: PSYCHIATRY & NEUROLOGY

## 2020-02-12 RX ADMIN — LORATADINE 10 MG: 10 TABLET ORAL at 08:40

## 2020-02-12 RX ADMIN — PALIPERIDONE 6 MG: 6 TABLET, FILM COATED, EXTENDED RELEASE ORAL at 08:40

## 2020-02-12 RX ADMIN — FLUTICASONE PROPIONATE 1 SPRAY: 50 SPRAY, METERED NASAL at 08:40

## 2020-02-12 NOTE — TREATMENT TEAM
02/11/20 1415   Activity/Group Checklist   Group Other (Comment)  (Art Therapy Group/Open Choice, Process Discussion)   Attendance Attended   Attendance Duration (min) Greater than 60   Interactions Interacted appropriately   Affect/Mood Appropriate   Goals Achieved Able to listen to others; Able to engage in interactions; Able to recieve feedback; Able to give feedback to another  (Able to engage materials; full participation)

## 2020-02-12 NOTE — PROGRESS NOTES
Progress Note - [address] Quijano 38 y o  female MRN: [id number]  Unit/Bed#: -01 Encounter: [id number]    The patient was seen for continuing care and reviewed with treatment team   Patient seen in office  Patient does not endorse any complaints at this time and denies any side effects to medications  States that she is eating and sleeping without difficulty  States that she has something to give her mom during the meeting today at 2:30 p m  Indicates that she would like to have her mother involved in her discharge plan  Reports that she is still okay with obtaining a personal room or going to warming shelter as discharge plan  Indicates that she does not want to go back to the [facility] as there are prostitutes and drug users there  Patient does still report some delusional material indicating that a past doctor had threatened to break her finger if she asked for a diagnosis" and my counselor was a skin head " When asked about UFO pictures she tells this interviewer to talk to other people that saw all the pictures in the ED  Indicates that she posted these pictures on Facebook where other people viewed and replied to them  Indicates that she has not totally sure that they were in fact UFO does but that there was something in the pictures  Patient was able to logically explain why she does not desire to have ICM      Current Mental Status Evaluation:  Appearance:  Adequate hygiene and grooming and Good eye contact   Behavior:  calm, cooperative and friendly   Mood:  euthymic   Affect: appropriate and mood-congruent   Speech: Normal rate and Normal volume   Thought Process:  Goal directed and coherent   Thought Content:  Delusions of persecution and Grandiose delusions   Perceptual Disturbances: Uncertain   Risk Potential: No suicidal or homicidal ideation   Orientation:   Alert and oriented x3     Progress Toward Goals:   Principal Problem:    Schizoaffective disorder, chronic condition with acute exacerbation (HCC)  Active Problems:    Elevated BP without diagnosis of hypertension    Medical clearance for psychiatric admission    Leukocytosis    Impaired fasting glucose    Chronic rhinitis      Recommended Treatment: Continue with pharmacotherapy, group therapy, milieu therapy and occupational therapy  Patient is cooperative with interview and denies any side effects to medications  Will continue current psychiatric medication regimen  Patient does continue to endorse delusional material as seen above  Will have family meeting today at 2:30 p m  Will have better idea of discharge plan following meeting  Patient states that she is okay discharging to a room that she would pay for or to warming shelter  Will continue to monitor  The patient will be maintained on the following medications: Continue Invega 6 mg QD       Current Facility-Administered Medications:  acetaminophen 650 mg Oral Q6H PRN Clarice Blair MD   aluminum-magnesium hydroxide-simethicone 30 mL Oral Q4H PRN Clarice Blair MD   benztropine 1 mg Intramuscular Q6H PRN Jewels Christian MD   benztropine 1 mg Oral Q6H PRN Jewels Christian MD   fluticasone 1 spray Each Nare Daily Carlee Che PA-C   ibuprofen 600 mg Oral Q8H PRN Jewels Christian MD   loratadine 10 mg Oral Daily Carlee Che PA-C   LORazepam 2 mg Intramuscular Q6H PRN Clarice Blair MD   LORazepam 1 mg Oral Q8H PRN Jewels Christian MD   magnesium hydroxide 30 mL Oral Daily PRN Clarice Blair MD   OLANZapine 5 mg Intramuscular BID PRN Jewels Christian MD   OLANZapine 5 mg Oral BID PRN Jewels Christian MD   paliperidone 6 mg Oral Daily [place] C Holter, DO   traZODone 50 mg Oral HS PRN Jewels Christian MD

## 2020-02-12 NOTE — PROGRESS NOTES
Pt presents as visible in public areas as well as bedroom  Currently denies SI, HI and A/V hallucinations  Compliant with meds  Observed as bright and cooperative with staff

## 2020-02-12 NOTE — PROGRESS NOTES
Pt has been visible on unit  She is quiet and to self  She is superficially pleasant and denies all symptoms  Pt appears suspicious at times  Pt denies needs and concerns  She has been compliant with routines and care

## 2020-02-12 NOTE — PROGRESS NOTES
Quiet and keeps to self  Pleasant  Remains guarded  Appears less suspicious  Stated that she is looking forward to seeing her mother today for their family meeting

## 2020-02-12 NOTE — PLAN OF CARE
Problem: Alteration in Thoughts and Perception  Goal: Treatment Goal: Gain control of psychotic behaviors/thinking, reduce/eliminate presenting symptoms and demonstrate improved reality functioning upon discharge  Outcome: Progressing  Goal: Verbalize thoughts and feelings  Description  Interventions:  - Promote a nonjudgmental and trusting relationship with the patient through active listening and therapeutic communication  - Assess patient's level of functioning, behavior and potential for risk  - Engage patient in 1 on 1 interactions  - Encourage patient to express fears, feelings, frustrations, and discuss symptoms    - [place] patient to reality, help patient recognize reality-based thinking   - Administer medications as ordered and assess for potential side effects  - Provide the patient education related to the signs and symptoms of the illness and desired effects of prescribed medications  Outcome: Progressing  Goal: Refrain from acting on delusional thinking/internal stimuli  Description  Interventions:  - Monitor patient closely, per order   - Utilize least restrictive measures   - Set reasonable limits, give positive feedback for acceptable   - Administer medications as ordered and monitor of potential side effects  Outcome: Progressing  Goal: Agree to be compliant with medication regime, as prescribed and report medication side effects  Description  Interventions:  - Offer appropriate PRN medication and supervise ingestion; conduct AIMS, as needed   Outcome: Progressing  Goal: Attend and participate in unit activities, including therapeutic, recreational, and educational groups  Description  Interventions:  -Encourage Visitation and family involvement in care  Outcome: Progressing  Goal: Recognize dysfunctional thoughts, communicate reality-based thoughts at the time of discharge  Description  Interventions:  - Provide medication and psycho-education to assist patient in compliance and developing insight into his/her illness   Outcome: Progressing     Problem: DISCHARGE PLANNING  Goal: Discharge to home or other facility with appropriate resources  Description  INTERVENTIONS:  - Identify barriers to discharge w/patient and caregiver  - Arrange for needed discharge resources and transportation as appropriate  - Identify discharge learning needs (meds, wound care, etc )  - Arrange for interpretive services to assist at discharge as needed  - Refer to Case Management Department for coordinating discharge planning if the patient needs post-hospital services based on physician/advanced practitioner order or complex needs related to functional status, cognitive ability, or social support system  Outcome: Progressing     Problem: Risk for Self Injury/Neglect  Goal: Treatment Goal: Remain safe during length of stay, learn and adopt new coping skills, and be free of self-injurious ideation, impulses and acts at the time of discharge  Outcome: Progressing     Problem: Ineffective Coping  Goal: Participates in unit activities  Description  Interventions:  - Provide therapeutic environment   - Provide required programming   - Redirect inappropriate behaviors   Outcome: Progressing

## 2020-02-12 NOTE — PROGRESS NOTES
02/12/20 1100   Activity/Group Checklist   Group   (recovery group)   Attendance Attended   Attendance Duration (min) 46-60  (saw MD)   Interactions Interacted appropriately  (paranoid)   Affect/Mood Appropriate   Goals Achieved Able to listen to others; Able to engage in interactions; Able to self-disclose

## 2020-02-12 NOTE — TREATMENT TEAM
[facility] GROUP SESSION  Arrived late to the group  Redirectable  Pleasant  02/12/20 1000   Activity/Group Checklist   Group Life Skills   Attendance Attended   Attendance Duration (min) 0-15   Interactions Interacted appropriately   Affect/Mood Appropriate   Goals Achieved Able to listen to others; Able to engage in interactions; Able to recieve feedback

## 2020-02-13 LAB
ATRIAL RATE: 82 BPM
ATRIAL RATE: 90 BPM
P AXIS: 57 DEGREES
P AXIS: 68 DEGREES
PR INTERVAL: 144 MS
PR INTERVAL: 158 MS
QRS AXIS: 83 DEGREES
QRS AXIS: 86 DEGREES
QRSD INTERVAL: 62 MS
QRSD INTERVAL: 62 MS
QT INTERVAL: 360 MS
QT INTERVAL: 370 MS
QTC INTERVAL: 432 MS
QTC INTERVAL: 440 MS
T WAVE AXIS: 48 DEGREES
T WAVE AXIS: 53 DEGREES
VENTRICULAR RATE: 82 BPM
VENTRICULAR RATE: 90 BPM

## 2020-02-13 PROCEDURE — NS001 PR NO SIGNATURE OR ATTESTATION: Performed by: PSYCHIATRY & NEUROLOGY

## 2020-02-13 PROCEDURE — 93010 ELECTROCARDIOGRAM REPORT: CPT | Performed by: INTERNAL MEDICINE

## 2020-02-13 RX ADMIN — PALIPERIDONE 6 MG: 6 TABLET, FILM COATED, EXTENDED RELEASE ORAL at 08:43

## 2020-02-13 RX ADMIN — FLUTICASONE PROPIONATE 1 SPRAY: 50 SPRAY, METERED NASAL at 08:43

## 2020-02-13 RX ADMIN — LORATADINE 10 MG: 10 TABLET ORAL at 08:43

## 2020-02-13 NOTE — TREATMENT TEAM
02/13/20 0733   Team Meeting   Meeting Type Daily Rounds   Team Members Present   Team Members Present Physician;Nurse;; Other (Discipline and Name)   Physician Team Member [facility]   Nursing Team Member [facility] Management Team Member Saima   Other (Discipline and Name) Michelle Watters   Patient/Family Present   Patient Present No   Patient's Family Present No     Patients paranoid delusions are fixed and patient may be at baseline  Continued med management  Possible d/c 2/20, when her Jaskaran April shot will be due at [facility]
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
## 2020-02-13 NOTE — TREATMENT TEAM
02/12/20 1415   Activity/Group Checklist   Group Other (Comment)  (Art Therapy Group/Incorporating Color, Process Discussion)   Attendance Attended   Attendance Duration (min) Greater than 60   Interactions Interacted appropriately   Affect/Mood Appropriate   Goals Achieved Able to listen to others; Able to engage in interactions; Able to recieve feedback; Able to give feedback to another
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
## 2020-02-13 NOTE — CASE MANAGEMENT
HEATHER called PT mother and informed her Pt will most likely be discharged 2/20/2020 for her appointment at [facility]  Pt will be referred to [address], given rooms for rent and extended stay hotels, also warming shelters, food diggs, and soup roberto in area  Mother is aware of discharge plans and instructed that [facility] was notified  Mother wants PT in housing, HEATHER explained Pt has exhausted several of those resources already and there are lengthy waiting lists  Pt is also unwilling to cooperate with ICM and ACT services, this limits her options also  Mother understood and HEATHER informed her that her phone number and address were utilized for PT referrals

## 2020-02-13 NOTE — CASE MANAGEMENT
HEATHER called and [facility] due to Pt being a [facility] individual admitted and informed of possible DC on 2/20/2020
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
## 2020-02-13 NOTE — TREATMENT TEAM
[facility] GROUP SESSION  Participated in group session  Expressed excitement for Sridhar Chi group       02/13/20 4737   Activity/Group Checklist   Group Community meeting   Attendance Attended   Attendance Duration (min) 16-30   Interactions Interacted appropriately   Affect/Mood Appropriate   Goals Achieved Able to engage in interactions; Able to listen to others

## 2020-02-13 NOTE — CASE MANAGEMENT
HEATHER called [facility] 2/20/2020 at 9:30 for IM Invega  Pt has not been attending therapy sessions, she is only going for medications  Pt refusing therapy at this time

## 2020-02-13 NOTE — PROGRESS NOTES
Patient stated that family meeting with her mother went well yesterday  A bit more visible and interactive today  A bit less guarded  Continues to appear suspicious at times

## 2020-02-14 PROCEDURE — 99232 SBSQ HOSP IP/OBS MODERATE 35: CPT | Performed by: PSYCHIATRY & NEUROLOGY

## 2020-02-14 RX ADMIN — LORATADINE 10 MG: 10 TABLET ORAL at 08:14

## 2020-02-14 RX ADMIN — PALIPERIDONE 9 MG: 3 TABLET, EXTENDED RELEASE ORAL at 08:14

## 2020-02-14 RX ADMIN — FLUTICASONE PROPIONATE 1 SPRAY: 50 SPRAY, METERED NASAL at 08:14

## 2020-02-14 NOTE — PLAN OF CARE
Problem: Alteration in Thoughts and Perception  Goal: Treatment Goal: Gain control of psychotic behaviors/thinking, reduce/eliminate presenting symptoms and demonstrate improved reality functioning upon discharge  Outcome: Progressing  Goal: Verbalize thoughts and feelings  Description  Interventions:  - Promote a nonjudgmental and trusting relationship with the patient through active listening and therapeutic communication  - Assess patient's level of functioning, behavior and potential for risk  - Engage patient in 1 on 1 interactions  - Encourage patient to express fears, feelings, frustrations, and discuss symptoms    - [place] patient to reality, help patient recognize reality-based thinking   - Administer medications as ordered and assess for potential side effects  - Provide the patient education related to the signs and symptoms of the illness and desired effects of prescribed medications  Outcome: Progressing  Goal: Refrain from acting on delusional thinking/internal stimuli  Description  Interventions:  - Monitor patient closely, per order   - Utilize least restrictive measures   - Set reasonable limits, give positive feedback for acceptable   - Administer medications as ordered and monitor of potential side effects  Outcome: Progressing  Goal: Agree to be compliant with medication regime, as prescribed and report medication side effects  Description  Interventions:  - Offer appropriate PRN medication and supervise ingestion; conduct AIMS, as needed   Outcome: Progressing  Goal: Attend and participate in unit activities, including therapeutic, recreational, and educational groups  Description  Interventions:  -Encourage Visitation and family involvement in care  Outcome: Progressing  Goal: Recognize dysfunctional thoughts, communicate reality-based thoughts at the time of discharge  Description  Interventions:  - Provide medication and psycho-education to assist patient in compliance and developing insight into his/her illness   Outcome: Progressing     Problem: DISCHARGE PLANNING  Goal: Discharge to home or other facility with appropriate resources  Description  INTERVENTIONS:  - Identify barriers to discharge w/patient and caregiver  - Arrange for needed discharge resources and transportation as appropriate  - Identify discharge learning needs (meds, wound care, etc )  - Arrange for interpretive services to assist at discharge as needed  - Refer to Case Management Department for coordinating discharge planning if the patient needs post-hospital services based on physician/advanced practitioner order or complex needs related to functional status, cognitive ability, or social support system  Outcome: Progressing     Problem: Risk for Self Injury/Neglect  Goal: Treatment Goal: Remain safe during length of stay, learn and adopt new coping skills, and be free of self-injurious ideation, impulses and acts at the time of discharge  Outcome: Progressing     Problem: Ineffective Coping  Goal: Participates in unit activities  Description  Interventions:  - Provide therapeutic environment   - Provide required programming   - Redirect inappropriate behaviors   Outcome: Progressing

## 2020-02-14 NOTE — PROGRESS NOTES
Progress Note - [address] Quijano 38 y o  female MRN: [id number]  Unit/Bed#: U 344-01 Encounter: [id number]    The patient was seen for continuing care and reviewed with treatment team   Patient was seen in office  Per nursing staff patient had episode of wakefulness last night but was able to get back to sleep  Patient continues to be paranoid/suspicious at times and expressing delusional material   She has been visible in public areas and social with peers  Attending groups and says that they played jeopardy today which she really enjoyed  Patient's Kandi Chan was increased yesterday and she has been medication compliant  Denies any side effects  Denies SI/HI/AVH  Indicates that she is eating without difficulty  She inquires about plans for injectable  Indicates that she would like to keep the same appointment for the 20th as she has behavioral health appointment at that time in addition to receiving her injections from the nurse practitioner who she states is only there on Thursdays  States that she is scheduled to get the 234 mg injection        Current Mental Status Evaluation:  Appearance:  Adequate hygiene and grooming and Good eye contact   Behavior:  calm, cooperative and friendly   Mood:  anxious   Affect: constricted and mood-congruent   Speech: Normal rate and Normal volume   Thought Process:  Goal directed and coherent   Thought Content:  Paranoid and mistrustful, Delusions of persecution and Grandiose delusions   Perceptual Disturbances: Denies hallucinations and does not appear to be responding to internal stimuli   Risk Potential: No suicidal or homicidal ideation   Orientation:   Alert and oriented x3     Progress Toward Goals:   Principal Problem:    Schizoaffective disorder, chronic condition with acute exacerbation ([facility] [address] )  Active Problems:    Elevated BP without diagnosis of hypertension    Medical clearance for psychiatric admission    Leukocytosis    Impaired fasting glucose    Chronic rhinitis      Recommended Treatment: Continue with pharmacotherapy, group therapy, milieu therapy and occupational therapy  Spoke with case management to inform them patient will be keeping her appointment on the 20th for her injection and behavioral health appointment  Patient is still suspicious/paranoid and expressing some delusional material   Patient denies any side effects to increase of Invega  Recent EKG showed QTC at 440  Will schedule repeat EKG on Monday to make sure QTC is not expanding  No additional changes to medications at this time  Will continue to monitor  The patient will be maintained on the following medications:  Continue Invega 9 mg QD  Ordered EKG to be conducted 02/17/2020      Current Facility-Administered Medications:  acetaminophen 650 mg Oral Q6H PRN Zaid Blair MD   aluminum-magnesium hydroxide-simethicone 30 mL Oral Q4H PRN Zaid Blair MD   benztropine 1 mg Intramuscular Q6H PRN Lena Torres MD   benztropine 1 mg Oral Q6H PRN Lena Torres MD   fluticasone 1 spray Each Nare Daily Carlee Che PA-C   ibuprofen 600 mg Oral Q8H PRN Lena Torres MD   loratadine 10 mg Oral Daily Carlee Che PA-C   LORazepam 2 mg Intramuscular Q6H PRN Zaid Blair MD   LORazepam 1 mg Oral Q8H PRN Zaid Blair MD   magnesium hydroxide 30 mL Oral Daily PRN Suresh Blair MD   OLANZapine 5 mg Intramuscular BID PRN Zaid Blair MD   OLANZapine 5 mg Oral BID PRN Lena Torres MD   paliperidone 9 mg Oral Daily Trey Downy, DO   traZODone 50 mg Oral HS PRN Lena Torres MD

## 2020-02-14 NOTE — PROGRESS NOTES
Pt visible on unit and in room this evening  Quiet and to self  Pleasant on approach and denies SI/HI and hallucinations  No delusions noted  Pt has been compliant with medications  She is calm and compliant with care  Will monitor

## 2020-02-14 NOTE — PROGRESS NOTES
Patient reported to RN that she did wake up once during the night but was able to fall back to sleep  Denies all symptoms but continues to appear paranoid/suspcious  Compliant with increased dose of Invega this morning  Will monitor

## 2020-02-14 NOTE — TREATMENT TEAM
ARMSTRONG GROUP SESSION  Participated in group session  Pleasant  02/14/20 1000   Activity/Group Checklist   Group Wellness   Attendance Attended   Attendance Duration (min) 31-45   Interactions Interacted appropriately   Affect/Mood Appropriate   Goals Achieved Able to listen to others; Able to engage in interactions

## 2020-02-14 NOTE — PROGRESS NOTES
02/14/20 1100   Activity/Group Checklist   Group   (recovery group)   Attendance Attended   Attendance Duration (min) 46-60   Interactions Interacted appropriately   Affect/Mood Appropriate   Goals Achieved Discussed coping strategies; Discussed self-esteem issues; Displayed empathy;Able to listen to others; Able to engage in interactions; Able to reflect/comment on own behavior;Able to self-disclose; Able to recieve feedback   Patient was able to identify that she sees the world as unsafe and that her perception may need to change for her to have a different experience

## 2020-02-14 NOTE — TREATMENT TEAM
02/14/20 0727   Team Meeting   Meeting Type Daily Rounds   Team Members Present   Team Members Present Physician;Nurse;; Other (Discipline and Name)   Physician Team Member [facility]   Nursing Team Member [facility] Management Team Member Alisia Curry   Other (Discipline and Name) Roya Smith and Michael   Patient/Family Present   Patient Present No   Patient's Family Present No   Medication management, possible DC next week

## 2020-02-14 NOTE — TREATMENT TEAM
02/13/20 1415   Activity/Group Checklist   Group Other (Comment)  (Art Therapy/Zooming Out-The Bigger Picture, Discussion)   Attendance Attended   Attendance Duration (min) Greater than 60   Interactions Interacted appropriately   Affect/Mood Appropriate   Goals Achieved Able to listen to others; Able to engage in interactions; Able to recieve feedback; Able to give feedback to another  (Able to engage materials; full participation)

## 2020-02-15 PROCEDURE — 99232 SBSQ HOSP IP/OBS MODERATE 35: CPT | Performed by: PSYCHIATRY & NEUROLOGY

## 2020-02-15 RX ADMIN — LORATADINE 10 MG: 10 TABLET ORAL at 08:45

## 2020-02-15 RX ADMIN — PALIPERIDONE 9 MG: 3 TABLET, EXTENDED RELEASE ORAL at 08:45

## 2020-02-15 RX ADMIN — FLUTICASONE PROPIONATE 1 SPRAY: 50 SPRAY, METERED NASAL at 10:22

## 2020-02-15 NOTE — PROGRESS NOTES
Progress Note - [address] Quijano 38 y o  female MRN: [id number]  Unit/Bed#: U 344-01 Encounter: [id number]  Staff reported: Has been complaint with treatment  When I met with PT she talked at length about her plans for discharged and how she feels about plans she needs to make in order to get her prescriptions filled so she can get her injection of Invega  I encouraged her to keep talking to her team about final disposition and I will try to find out from staff more about plans already made      Behavior over the last 24 hours:  improved  Sleep: normal  Appetite: normal  Medication side effects: No  ROS: no complaints    Medications:   Current Facility-Administered Medications   Medication Dose Route Frequency Provider Last Rate Last Dose    acetaminophen (TYLENOL) tablet 650 mg  650 mg Oral Q6H PRN Margie Nova MD        aluminum-magnesium hydroxide-simethicone (MYLANTA) 200-200-20 mg/5 mL oral suspension 30 mL  30 mL Oral Q4H PRN Margie Nova MD        benztropine (COGENTIN) injection 1 mg  1 mg Intramuscular Q6H PRILIA Nova MD        benztropine (COGENTIN) tablet 1 mg  1 mg Oral Q6H PRN Margie Nova MD        fluticasone (FLONASE) 50 mcg/act nasal spray 1 spray  1 spray Each Nare Daily Efraín Link, PA-C   1 spray at 02/14/20 0814    ibuprofen (MOTRIN) tablet 600 mg  600 mg Oral Q8H PRN Margie Nova MD        loratadine (CLARITIN) tablet 10 mg  10 mg Oral Daily Efraín Link, PA-C   10 mg at 02/14/20 3852    LORazepam (ATIVAN) injection 2 mg  2 mg Intramuscular Q6H PRILIA Nova MD        LORazepam (ATIVAN) tablet 1 mg  1 mg Oral Q8H PRN Margie Nova MD        magnesium hydroxide (MILK OF MAGNESIA) 400 mg/5 mL oral suspension 30 mL  30 mL Oral Daily PRN Margie Nova MD        OLANZapine (ZyPREXA) IM injection 5 mg  5 mg Intramuscular BID PRN Margie Nova MD        OLANZapine (ZyPREXA) tablet 5 mg  5 mg Oral BID PRILIA MARIE MD Rossy        paliperidone (INVEGA) 24 hr tablet 9 mg  9 mg Oral Daily Walda Frankel, DO   9 mg at 02/14/20 0814    traZODone (DESYREL) tablet 50 mg  50 mg Oral HS PRN Corina Layne MD         Medications Prior to Admission   Medication    fluticasone (FLONASE) 50 mcg/act nasal spray    paliperidone (INVEGA) 6 MG 24 hr tablet    paliperidone palmitate ER (INVEGA) 234 mg/1 5 mL IM injection    carbamide peroxide (DEBROX) 6 5 % otic solution    sodium chloride (OCEAN) 0 65 % nasal spray       Labs:   Admission on 02/03/2020   Component Date Value    Amph/Meth UR 02/03/2020 Negative     Barbiturate Ur 02/03/2020 Negative     Benzodiazepine Urine 02/03/2020 Negative     Cocaine Urine 02/03/2020 Negative     Methadone Urine 02/03/2020 Negative     Opiate Urine 02/03/2020 Negative     PCP Ur 02/03/2020 Negative     THC Urine 02/03/2020 Negative     EXT PREG TEST UR (Ref: N* 02/03/2020 negative     Control 02/03/2020 valid     EXTBreath Alcohol 02/03/2020 0 000     TSH 3RD GENERATON 02/04/2020 3 690     Color, UA 02/03/2020 Straw     Clarity, UA 02/03/2020 Clear     Specific Gravity, UA 02/03/2020 1 010     pH, UA 02/03/2020 6 0     Leukocytes, UA 02/03/2020 Negative     Nitrite, UA 02/03/2020 Negative     Protein, UA 02/03/2020 Negative     Glucose, UA 02/03/2020 Negative     Ketones, UA 02/03/2020 Negative     Bilirubin, UA 02/03/2020 Negative     Blood, UA 02/03/2020 Negative     UROBILINOGEN UA 02/03/2020 Negative     Cholesterol 02/04/2020 180     Triglycerides 02/04/2020 134     HDL, Direct 02/04/2020 23*    LDL Calculated 02/04/2020 130*    Non-HDL-Chol (CHOL-HDL) 02/04/2020 157     RPR 02/04/2020 Non-Reactive     Sodium 02/04/2020 136*    Potassium 02/04/2020 3 6     Chloride 02/04/2020 103     CO2 02/04/2020 24     ANION GAP 02/04/2020 9     BUN 02/04/2020 4*    Creatinine 02/04/2020 0 67     Glucose 02/04/2020 111*    Glucose, Fasting 02/04/2020 111*    Calcium 02/04/2020 8 7     AST 02/04/2020 19     ALT 02/04/2020 21     Alkaline Phosphatase 02/04/2020 79     Total Protein 02/04/2020 7 4     Albumin 02/04/2020 3 8     Total Bilirubin 02/04/2020 0 40     eGFR 02/04/2020 106     WBC 02/04/2020 11 10*    RBC 02/04/2020 4 82     Hemoglobin 02/04/2020 13 9     Hematocrit 02/04/2020 41 4     MCV 02/04/2020 86     MCH 02/04/2020 28 9     MCHC 02/04/2020 33 6     RDW 02/04/2020 14 6     MPV 02/04/2020 8 2*    Platelets 73/89/0751 347     Neutrophils Relative 02/04/2020 72*    Lymphocytes Relative 02/04/2020 19*    Monocytes Relative 02/04/2020 6     Eosinophils Relative 02/04/2020 3     Basophils Relative 02/04/2020 1     Neutrophils Absolute 02/04/2020 8 00*    Lymphocytes Absolute 02/04/2020 2 10     Monocytes Absolute 02/04/2020 0 60     Eosinophils Absolute 02/04/2020 0 40     Basophils Absolute 02/04/2020 0 10     WBC 02/07/2020 9 70     RBC 02/07/2020 4 79     Hemoglobin 02/07/2020 13 7     Hematocrit 02/07/2020 40 7     MCV 02/07/2020 85     MCH 02/07/2020 28 6     MCHC 02/07/2020 33 6     RDW 02/07/2020 14 4     MPV 02/07/2020 8 2*    Platelets 62/83/3958 328     Neutrophils Relative 02/07/2020 72*    Lymphocytes Relative 02/07/2020 18*    Monocytes Relative 02/07/2020 6     Eosinophils Relative 02/07/2020 4     Basophils Relative 02/07/2020 0     Neutrophils Absolute 02/07/2020 7 00     Lymphocytes Absolute 02/07/2020 1 70     Monocytes Absolute 02/07/2020 0 60     Eosinophils Absolute 02/07/2020 0 40     Basophils Absolute 02/07/2020 0 00     Sodium 02/07/2020 135*    Potassium 02/07/2020 4 1     Chloride 02/07/2020 103     CO2 02/07/2020 23     ANION GAP 02/07/2020 9     BUN 02/07/2020 8     Creatinine 02/07/2020 0 73     Glucose 02/07/2020 119*    Glucose, Fasting 02/07/2020 119*    Calcium 02/07/2020 8 8     AST 02/07/2020 14     ALT 02/07/2020 17     Alkaline Phosphatase 02/07/2020 65     Total Protein 02/07/2020 6 9     Albumin 02/07/2020 3 5     Total Bilirubin 02/07/2020 0 30     eGFR 02/07/2020 100     Ventricular Rate 02/12/2020 90     Atrial Rate 02/12/2020 90     IL Interval 02/12/2020 144     QRSD Interval 02/12/2020 62     QT Interval 02/12/2020 360     QTC Interval 02/12/2020 440     P Axis 02/12/2020 68     QRS Axis 02/12/2020 83     T Wave Axis 02/12/2020 48     Ventricular Rate 02/12/2020 82     Atrial Rate 02/12/2020 82     IL Interval 02/12/2020 158     QRSD Interval 02/12/2020 62     QT Interval 02/12/2020 370     QTC Interval 02/12/2020 432     P Axis 02/12/2020 57     QRS Axis 02/12/2020 86     T Wave Axis 02/12/2020 53        Mental Status Evaluation:  Appearance:  age appropriate and casually dressed   Behavior:  Cooperative   Speech:  Normal rate and rhythm   Mood:  Less labile   Affect:  mood-congruent   Associations: intact associations   Thought Process:  goal directed   Thought Content:  No overt delusions   Perceptual Disturbances: Denied auditory and visual hallucinations   Risk Potential: Deny suicidal homicidal thoughts and plans  Low risk for aggression   Sensorium:  person, place and time/date   Memory recent and remote memory grossly intact   Consciousness:  alert    Attention: attention span and concentration were age appropriate   Insight:  Improving   Judgment: Improving   Gait/Station: normal gait/station   Motor Activity: no abnormal movements     Progress Toward Goals:   Patient has been cooperative and compliant with treatment    She believes she is close to baseline and is ready to be discharged she has some concern about how to get to her appointment and also have her prescriptions filled and I encouraged her to continue to express her concerns to team     Assessment/Plan   Principal Problem:    Schizoaffective disorder, chronic condition with acute exacerbation ([facility] [address] )  Active Problems:    Elevated BP without diagnosis of hypertension    Medical clearance for psychiatric admission    Leukocytosis    Impaired fasting glucose    Chronic rhinitis  Medications:  Invega 9 mg daily  Invega IM monthly  Recommended Treatment: Continue with group therapy, milieu therapy and occupational therapy  Risks, benefits and possible side effects of Medications:   Risks, benefits, and possible side effects of medications explained to patient and patient verbalizes understanding  Counseling / Coordination of Care  Total floor / unit time spent today 20 minutes  Greater than 50% of total time was spent with the patient and / or family counseling and / or coordination of care  A description of the counseling / coordination of care:   Assessment, medication management supportive psychotherapy addressing how to advocate for herself and prevent relapses

## 2020-02-15 NOTE — TREATMENT TEAM
02/14/20 1415   Activity/Group Checklist   Group Other (Comment)  (Art Therapy Group/Visualizing Love, Process Discussion)   Attendance Attended   Attendance Duration (min) Greater than 60   Interactions Interacted appropriately   Affect/Mood Appropriate;Normal range   Goals Achieved Discussed coping strategies; Able to listen to others; Able to engage in interactions; Able to recieve feedback; Able to give feedback to another  (Able to engage materials; full participation)

## 2020-02-15 NOTE — PLAN OF CARE
Problem: Alteration in Thoughts and Perception  Goal: Treatment Goal: Gain control of psychotic behaviors/thinking, reduce/eliminate presenting symptoms and demonstrate improved reality functioning upon discharge  Outcome: Progressing  Goal: Verbalize thoughts and feelings  Description  Interventions:  - Promote a nonjudgmental and trusting relationship with the patient through active listening and therapeutic communication  - Assess patient's level of functioning, behavior and potential for risk  - Engage patient in 1 on 1 interactions  - Encourage patient to express fears, feelings, frustrations, and discuss symptoms    - [place] patient to reality, help patient recognize reality-based thinking   - Administer medications as ordered and assess for potential side effects  - Provide the patient education related to the signs and symptoms of the illness and desired effects of prescribed medications  Outcome: Progressing  Goal: Refrain from acting on delusional thinking/internal stimuli  Description  Interventions:  - Monitor patient closely, per order   - Utilize least restrictive measures   - Set reasonable limits, give positive feedback for acceptable   - Administer medications as ordered and monitor of potential side effects  Outcome: Progressing  Goal: Agree to be compliant with medication regime, as prescribed and report medication side effects  Description  Interventions:  - Offer appropriate PRN medication and supervise ingestion; conduct AIMS, as needed   Outcome: Progressing  Goal: Attend and participate in unit activities, including therapeutic, recreational, and educational groups  Description  Interventions:  -Encourage Visitation and family involvement in care  Outcome: Progressing  Goal: Recognize dysfunctional thoughts, communicate reality-based thoughts at the time of discharge  Description  Interventions:  - Provide medication and psycho-education to assist patient in compliance and developing insight into his/her illness   Outcome: Progressing     Problem: DISCHARGE PLANNING  Goal: Discharge to home or other facility with appropriate resources  Description  INTERVENTIONS:  - Identify barriers to discharge w/patient and caregiver  - Arrange for needed discharge resources and transportation as appropriate  - Identify discharge learning needs (meds, wound care, etc )  - Arrange for interpretive services to assist at discharge as needed  - Refer to Case Management Department for coordinating discharge planning if the patient needs post-hospital services based on physician/advanced practitioner order or complex needs related to functional status, cognitive ability, or social support system  Outcome: Progressing     Problem: Risk for Self Injury/Neglect  Goal: Treatment Goal: Remain safe during length of stay, learn and adopt new coping skills, and be free of self-injurious ideation, impulses and acts at the time of discharge  Outcome: Progressing     Problem: Ineffective Coping  Goal: Participates in unit activities  Description  Interventions:  - Provide therapeutic environment   - Provide required programming   - Redirect inappropriate behaviors   Outcome: Progressing

## 2020-02-15 NOTE — PROGRESS NOTES
Pt visible on unit, bright and social with peers  Has been attending groups and making phone calls  Future/goal oriented  Denies all symptoms  Calm and compliant with routines and care  Will monitor

## 2020-02-15 NOTE — PROGRESS NOTES
Patient had spoken with her mom and patient reports "My mom wont come visit me tomorrow for my birthday because she is mad at me "  Patient states her mother wants her to stay in the hospital   Patient feels her relationship with her mother is parent/child instead of adult/adult  Patient states her mother uses hospitalizations as  power over her  Patient has good insight to her relationship with her mother and her own issues  Patient is bright, polite, calm, and medication compliant  Patient denies SI/HI and denies A/V hallucinations

## 2020-02-16 PROCEDURE — NC001 PR NO CHARGE: Performed by: PSYCHIATRY & NEUROLOGY

## 2020-02-16 RX ADMIN — LORAZEPAM 1 MG: 1 TABLET ORAL at 01:38

## 2020-02-16 RX ADMIN — LORATADINE 10 MG: 10 TABLET ORAL at 09:04

## 2020-02-16 RX ADMIN — FLUTICASONE PROPIONATE 1 SPRAY: 50 SPRAY, METERED NASAL at 09:05

## 2020-02-16 RX ADMIN — PALIPERIDONE 9 MG: 3 TABLET, EXTENDED RELEASE ORAL at 09:04

## 2020-02-16 NOTE — PROGRESS NOTES
Pt c/o anxiety, stated she felt sad and anxious because it is her birthday today and her mother won't come see her  She requested prn of Ativan; same was given

## 2020-02-16 NOTE — PROGRESS NOTES
Pt visible on unit and pleasant on approach  Continues making calls to try to get mother to come visit for birthday  Asked RN to take call and try to find out what she has done wrong in her mother's eyes and why she won't visit; mother has not called  Pt seems to be harboring guilt about not meeting mother's expectations but is also expressing anger for mother "intruding on her business and putting her in the hospital all the time"  Despite this, pt is bright and social on unit  Denies SI/HI and hallucinations  She is compliant with routines and care  Will continue to monitor

## 2020-02-16 NOTE — PROGRESS NOTES
Patient was cooperative with medications and pleasant in conversation this morning  Said she slept well once she "finally got to sleep " Edelmira Delarosa she had been upset because her mother said she "didn't want to" come and visit her today, on her birthday  Said her mother had originally taken off from work to see her but now wasn't even going to visit her in the hospital  Edelmira Delarosa she was a bit sad about this but would try calling her again today to convince her  Patient denied symptoms and needs, including anxiety  Patient returned to her room to nap for a few hours this morning before rejoining the milieu

## 2020-02-16 NOTE — PROGRESS NOTES
Progress Note - [address] 54 y o  female MRN: [id number]  Unit/Bed#: [facility] 344-01 Encounter: [id number]  Staff reported: Has been complaint with treatment today is her birthday and she is hoping mother can visit  When I met with PT we discussed what I was told about her discharged plans and that I reported to staff  Some of the PTs concerns  PT seems to know the system well and wants to make sure the plan would work       Behavior over the last 24 hours:  improved  Sleep: normal  Appetite: normal  Medication side effects: No  ROS: no complaints    Medications:   Current Facility-Administered Medications   Medication Dose Route Frequency Provider Last Rate Last Dose    acetaminophen (TYLENOL) tablet 650 mg  650 mg Oral Q6H PRN Lucho Newman MD        aluminum-magnesium hydroxide-simethicone (MYLANTA) 200-200-20 mg/5 mL oral suspension 30 mL  30 mL Oral Q4H PRN Lucho Newman MD        benztropine (COGENTIN) injection 1 mg  1 mg Intramuscular Q6H PRN Lucho Newman MD        benztropine (COGENTIN) tablet 1 mg  1 mg Oral Q6H PRN Lucho Newman MD        fluticasone (FLONASE) 50 mcg/act nasal spray 1 spray  1 spray Each Nare Daily Ronald Butler PA-C   1 spray at 02/16/20 0905    ibuprofen (MOTRIN) tablet 600 mg  600 mg Oral Q8H PRN Lucho Newman MD        loratadine (CLARITIN) tablet 10 mg  10 mg Oral Daily Ronald Butler PA-C   10 mg at 02/16/20 9376    LORazepam (ATIVAN) injection 2 mg  2 mg Intramuscular Q6H PRN Lucho Newman MD        LORazepam (ATIVAN) tablet 1 mg  1 mg Oral Q8H PRN Lucho Newman MD   1 mg at 02/16/20 0138    magnesium hydroxide (MILK OF MAGNESIA) 400 mg/5 mL oral suspension 30 mL  30 mL Oral Daily PRN Lucho Newman MD        OLANZapine (ZyPREXA) IM injection 5 mg  5 mg Intramuscular BID PRN Lucho Newman MD        OLANZapine (ZyPREXA) tablet 5 mg  5 mg Oral BID PRN Lucho Newman MD        paliperidone (INVEGA) 24 hr tablet 9 mg 9 mg Oral Daily Walda Frankel, DO   9 mg at 02/16/20 0904    traZODone (DESYREL) tablet 50 mg  50 mg Oral HS PRN Corina Layne MD         Medications Prior to Admission   Medication    fluticasone (FLONASE) 50 mcg/act nasal spray    paliperidone (INVEGA) 6 MG 24 hr tablet    paliperidone palmitate ER (INVEGA) 234 mg/1 5 mL IM injection    carbamide peroxide (DEBROX) 6 5 % otic solution    sodium chloride (OCEAN) 0 65 % nasal spray       Labs:   Admission on 02/03/2020   Component Date Value    Amph/Meth UR 02/03/2020 Negative     Barbiturate Ur 02/03/2020 Negative     Benzodiazepine Urine 02/03/2020 Negative     Cocaine Urine 02/03/2020 Negative     Methadone Urine 02/03/2020 Negative     Opiate Urine 02/03/2020 Negative     PCP Ur 02/03/2020 Negative     THC Urine 02/03/2020 Negative     EXT PREG TEST UR (Ref: N* 02/03/2020 negative     Control 02/03/2020 valid     EXTBreath Alcohol 02/03/2020 0 000     TSH 3RD GENERATON 02/04/2020 3 690     Color, UA 02/03/2020 Straw     Clarity, UA 02/03/2020 Clear     Specific Gravity, UA 02/03/2020 1 010     pH, UA 02/03/2020 6 0     Leukocytes, UA 02/03/2020 Negative     Nitrite, UA 02/03/2020 Negative     Protein, UA 02/03/2020 Negative     Glucose, UA 02/03/2020 Negative     Ketones, UA 02/03/2020 Negative     Bilirubin, UA 02/03/2020 Negative     Blood, UA 02/03/2020 Negative     UROBILINOGEN UA 02/03/2020 Negative     Cholesterol 02/04/2020 180     Triglycerides 02/04/2020 134     HDL, Direct 02/04/2020 23*    LDL Calculated 02/04/2020 130*    Non-HDL-Chol (CHOL-HDL) 02/04/2020 157     RPR 02/04/2020 Non-Reactive     Sodium 02/04/2020 136*    Potassium 02/04/2020 3 6     Chloride 02/04/2020 103     CO2 02/04/2020 24     ANION GAP 02/04/2020 9     BUN 02/04/2020 4*    Creatinine 02/04/2020 0 67     Glucose 02/04/2020 111*    Glucose, Fasting 02/04/2020 111*    Calcium 02/04/2020 8 7     AST 02/04/2020 19     ALT 02/04/2020 21  Alkaline Phosphatase 02/04/2020 79     Total Protein 02/04/2020 7 4     Albumin 02/04/2020 3 8     Total Bilirubin 02/04/2020 0 40     eGFR 02/04/2020 106     WBC 02/04/2020 11 10*    RBC 02/04/2020 4 82     Hemoglobin 02/04/2020 13 9     Hematocrit 02/04/2020 41 4     MCV 02/04/2020 86     MCH 02/04/2020 28 9     MCHC 02/04/2020 33 6     RDW 02/04/2020 14 6     MPV 02/04/2020 8 2*    Platelets 46/72/6502 347     Neutrophils Relative 02/04/2020 72*    Lymphocytes Relative 02/04/2020 19*    Monocytes Relative 02/04/2020 6     Eosinophils Relative 02/04/2020 3     Basophils Relative 02/04/2020 1     Neutrophils Absolute 02/04/2020 8 00*    Lymphocytes Absolute 02/04/2020 2 10     Monocytes Absolute 02/04/2020 0 60     Eosinophils Absolute 02/04/2020 0 40     Basophils Absolute 02/04/2020 0 10     WBC 02/07/2020 9 70     RBC 02/07/2020 4 79     Hemoglobin 02/07/2020 13 7     Hematocrit 02/07/2020 40 7     MCV 02/07/2020 85     MCH 02/07/2020 28 6     MCHC 02/07/2020 33 6     RDW 02/07/2020 14 4     MPV 02/07/2020 8 2*    Platelets 14/10/9780 328     Neutrophils Relative 02/07/2020 72*    Lymphocytes Relative 02/07/2020 18*    Monocytes Relative 02/07/2020 6     Eosinophils Relative 02/07/2020 4     Basophils Relative 02/07/2020 0     Neutrophils Absolute 02/07/2020 7 00     Lymphocytes Absolute 02/07/2020 1 70     Monocytes Absolute 02/07/2020 0 60     Eosinophils Absolute 02/07/2020 0 40     Basophils Absolute 02/07/2020 0 00     Sodium 02/07/2020 135*    Potassium 02/07/2020 4 1     Chloride 02/07/2020 103     CO2 02/07/2020 23     ANION GAP 02/07/2020 9     BUN 02/07/2020 8     Creatinine 02/07/2020 0 73     Glucose 02/07/2020 119*    Glucose, Fasting 02/07/2020 119*    Calcium 02/07/2020 8 8     AST 02/07/2020 14     ALT 02/07/2020 17     Alkaline Phosphatase 02/07/2020 65     Total Protein 02/07/2020 6 9     Albumin 02/07/2020 3 5     Total Bilirubin 02/07/2020 0 30     eGFR 02/07/2020 100     Ventricular Rate 02/12/2020 90     Atrial Rate 02/12/2020 90     ID Interval 02/12/2020 144     QRSD Interval 02/12/2020 62     QT Interval 02/12/2020 360     QTC Interval 02/12/2020 440     P Axis 02/12/2020 68     QRS Axis 02/12/2020 83     T Wave Axis 02/12/2020 48     Ventricular Rate 02/12/2020 82     Atrial Rate 02/12/2020 82     ID Interval 02/12/2020 158     QRSD Interval 02/12/2020 62     QT Interval 02/12/2020 370     QTC Interval 02/12/2020 432     P Axis 02/12/2020 57     QRS Axis 02/12/2020 86     T Wave Axis 02/12/2020 53        Mental Status Evaluation:   Evaluated and unchanged al Status  Appearance:  age appropriate and casually dressed   Behavior:  Cooperative   Speech:  Normal rate and rhythm   Mood:  Less labile   Affect:  mood-congruent   Associations: intact associations   Thought Process:  goal directed   Thought Content:  No overt delusions   Perceptual Disturbances: Denied auditory and visual hallucinations   Risk Potential: Deny suicidal homicidal thoughts and plans  Low risk for aggression   Sensorium:  person, place and time/date   Memory recent and remote memory grossly intact   Consciousness:  alert    Attention: attention span and concentration were age appropriate   Insight:  Improving   Judgment: Improving   Gait/Station: normal gait/station   Motor Activity: no abnormal movements     Progress Toward Goals:   Patient has been cooperative and compliant with treatment  Today's her birthday and she hopes her mother comes to visit her  She is working hard not to let it upset her if she does not as she does not want to decompensate    We spoke at length about her concerns regarding her discharge planning and I discussed that with staff      fAssessment/Plan   Principal Problem:    Schizoaffective disorder, chronic condition with acute exacerbation ([facility] [address] )  Active Problems:    Elevated BP without diagnosis of hypertension Medical clearance for psychiatric admission    Leukocytosis    Impaired fasting glucose    Chronic rhinitis  Medications:  Invega 9 mg daily  Invega IM monthly  Recommended Treatment: Continue with group therapy, milieu therapy and occupational therapy  Risks, benefits and possible side effects of Medications:   Risks, benefits, and possible side effects of medications explained to patient and patient verbalizes understanding  Counseling / Coordination of Care  Total floor / unit time spent today 20 minutes  Greater than 50% of total time was spent with the patient and / or family counseling and / or coordination of care  A description of the counseling / coordination of care:   Assessment, medication management supportive psychotherapy addressing how to advocate for herself and prevent relapses  Will continue to work with nursing regarding discharge planning

## 2020-02-16 NOTE — PLAN OF CARE
Problem: Alteration in Thoughts and Perception  Goal: Treatment Goal: Gain control of psychotic behaviors/thinking, reduce/eliminate presenting symptoms and demonstrate improved reality functioning upon discharge  Outcome: Progressing  Goal: Verbalize thoughts and feelings  Description  Interventions:  - Promote a nonjudgmental and trusting relationship with the patient through active listening and therapeutic communication  - Assess patient's level of functioning, behavior and potential for risk  - Engage patient in 1 on 1 interactions  - Encourage patient to express fears, feelings, frustrations, and discuss symptoms    - [place] patient to reality, help patient recognize reality-based thinking   - Administer medications as ordered and assess for potential side effects  - Provide the patient education related to the signs and symptoms of the illness and desired effects of prescribed medications  Outcome: Progressing  Goal: Refrain from acting on delusional thinking/internal stimuli  Description  Interventions:  - Monitor patient closely, per order   - Utilize least restrictive measures   - Set reasonable limits, give positive feedback for acceptable   - Administer medications as ordered and monitor of potential side effects  Outcome: Progressing  Goal: Agree to be compliant with medication regime, as prescribed and report medication side effects  Description  Interventions:  - Offer appropriate PRN medication and supervise ingestion; conduct AIMS, as needed   Outcome: Progressing  Goal: Recognize dysfunctional thoughts, communicate reality-based thoughts at the time of discharge  Description  Interventions:  - Provide medication and psycho-education to assist patient in compliance and developing insight into his/her illness   Outcome: Progressing     Problem: Risk for Self Injury/Neglect  Goal: Treatment Goal: Remain safe during length of stay, learn and adopt new coping skills, and be free of self-injurious ideation, impulses and acts at the time of discharge  Outcome: Progressing

## 2020-02-16 NOTE — PROGRESS NOTES
Pt sitting by phone and appeared down, states she is waiting for phone to come on to call her mom again to try and convince her to come in for her birthday tomorrow  Brightens when speaking with staff

## 2020-02-17 LAB
ATRIAL RATE: 81 BPM
P AXIS: 67 DEGREES
PR INTERVAL: 146 MS
QRS AXIS: 86 DEGREES
QRSD INTERVAL: 72 MS
QT INTERVAL: 376 MS
QTC INTERVAL: 436 MS
T WAVE AXIS: 52 DEGREES
VENTRICULAR RATE: 81 BPM

## 2020-02-17 PROCEDURE — 93010 ELECTROCARDIOGRAM REPORT: CPT | Performed by: INTERNAL MEDICINE

## 2020-02-17 PROCEDURE — 99232 SBSQ HOSP IP/OBS MODERATE 35: CPT | Performed by: PSYCHIATRY & NEUROLOGY

## 2020-02-17 PROCEDURE — 93005 ELECTROCARDIOGRAM TRACING: CPT

## 2020-02-17 RX ADMIN — PALIPERIDONE 9 MG: 3 TABLET, EXTENDED RELEASE ORAL at 08:30

## 2020-02-17 RX ADMIN — LORATADINE 10 MG: 10 TABLET ORAL at 08:30

## 2020-02-17 RX ADMIN — FLUTICASONE PROPIONATE 1 SPRAY: 50 SPRAY, METERED NASAL at 08:31

## 2020-02-17 NOTE — PROGRESS NOTES
Pt denies all symptoms  Pt is mainly seclusive to room out for meals and groups  Pt is scant in conversation but pleasant  Pt is calm and cooperative  No paranoia noted during interaction  Pt has no complaints or concerns  Will monitor

## 2020-02-17 NOTE — PROGRESS NOTES
02/17/20 1100   Activity/Group Checklist   Group   (recovery group)   Attendance Attended   Attendance Duration (min) 46-60   Interactions Interacted appropriately   Affect/Mood Appropriate   Goals Achieved Discussed self-esteem issues; Able to listen to others; Able to engage in interactions; Able to self-disclose; Able to recieve feedback; Able to give feedback to another

## 2020-02-17 NOTE — CASE MANAGEMENT
HEATHER called [facility] and left message  HEATHER called [place] [employer] and the [facility] IM is there for , PT can no longer receive IM at the pharmacy  Must be picked up and administered by [facility]  Pt last received injection on 1/17/2020  HEATHER will arrange  of medication for IM will need to be administered today  HEATHER called Pt mother to inquire if she is available to  prescription and bring to hospital for Pt today  HEATHER left message

## 2020-02-17 NOTE — PROGRESS NOTES
Patient remains guarded although pleasant  States that she is hoping that she could possibly go to her moms after discharge until she finds a place to go  No paranoia noted during interaction  Cooperative  About the unit

## 2020-02-17 NOTE — TREATMENT TEAM
02/17/20 0736   Team Meeting   Meeting Type Daily Rounds   Team Members Present   Team Members Present Physician;Nurse;; Other (Discipline and Name)   Physician Team Member Dr MCGRATH   Nursing Team Member MAYUR [facility] Management Team Member Octavio Gamez   Other (Discipline and Name) Michael Drake   Patient/Family Present   Patient Present No   Patient's Family Present No     Med Management    D/C on the 20th Feb

## 2020-02-17 NOTE — PLAN OF CARE
Problem: Alteration in Thoughts and Perception  Goal: Treatment Goal: Gain control of psychotic behaviors/thinking, reduce/eliminate presenting symptoms and demonstrate improved reality functioning upon discharge  Outcome: Progressing  Goal: Verbalize thoughts and feelings  Description  Interventions:  - Promote a nonjudgmental and trusting relationship with the patient through active listening and therapeutic communication  - Assess patient's level of functioning, behavior and potential for risk  - Engage patient in 1 on 1 interactions  - Encourage patient to express fears, feelings, frustrations, and discuss symptoms    - [place] patient to reality, help patient recognize reality-based thinking   - Administer medications as ordered and assess for potential side effects  - Provide the patient education related to the signs and symptoms of the illness and desired effects of prescribed medications  Outcome: Progressing  Goal: Refrain from acting on delusional thinking/internal stimuli  Description  Interventions:  - Monitor patient closely, per order   - Utilize least restrictive measures   - Set reasonable limits, give positive feedback for acceptable   - Administer medications as ordered and monitor of potential side effects  Outcome: Progressing  Goal: Agree to be compliant with medication regime, as prescribed and report medication side effects  Description  Interventions:  - Offer appropriate PRN medication and supervise ingestion; conduct AIMS, as needed   Outcome: Progressing  Goal: Attend and participate in unit activities, including therapeutic, recreational, and educational groups  Description  Interventions:  -Encourage Visitation and family involvement in care  Outcome: Progressing  Goal: Recognize dysfunctional thoughts, communicate reality-based thoughts at the time of discharge  Description  Interventions:  - Provide medication and psycho-education to assist patient in compliance and developing insight into his/her illness   Outcome: Progressing     Problem: DISCHARGE PLANNING  Goal: Discharge to home or other facility with appropriate resources  Description  INTERVENTIONS:  - Identify barriers to discharge w/patient and caregiver  - Arrange for needed discharge resources and transportation as appropriate  - Identify discharge learning needs (meds, wound care, etc )  - Arrange for interpretive services to assist at discharge as needed  - Refer to Case Management Department for coordinating discharge planning if the patient needs post-hospital services based on physician/advanced practitioner order or complex needs related to functional status, cognitive ability, or social support system  Outcome: Progressing     Problem: Risk for Self Injury/Neglect  Goal: Treatment Goal: Remain safe during length of stay, learn and adopt new coping skills, and be free of self-injurious ideation, impulses and acts at the time of discharge  Outcome: Progressing     Problem: Ineffective Coping  Goal: Participates in unit activities  Description  Interventions:  - Provide therapeutic environment   - Provide required programming   - Redirect inappropriate behaviors   Outcome: Progressing

## 2020-02-18 PROCEDURE — 99232 SBSQ HOSP IP/OBS MODERATE 35: CPT | Performed by: PSYCHIATRY & NEUROLOGY

## 2020-02-18 RX ORDER — PALIPERIDONE 6 MG/1
6 TABLET, EXTENDED RELEASE ORAL DAILY
Status: DISCONTINUED | OUTPATIENT
Start: 2020-02-19 | End: 2020-02-19 | Stop reason: HOSPADM

## 2020-02-18 RX ADMIN — PALIPERIDONE PALMITATE 234 MG: 234 INJECTION INTRAMUSCULAR at 15:52

## 2020-02-18 RX ADMIN — PALIPERIDONE 9 MG: 3 TABLET, EXTENDED RELEASE ORAL at 08:25

## 2020-02-18 RX ADMIN — FLUTICASONE PROPIONATE 1 SPRAY: 50 SPRAY, METERED NASAL at 08:25

## 2020-02-18 RX ADMIN — LORATADINE 10 MG: 10 TABLET ORAL at 08:25

## 2020-02-18 NOTE — PROGRESS NOTES
Myra Chambers  was seen for continuing care and reviewed with staff  Progress Note - [address] 54 y o  female MRN: @MRN   Unit/Bed#: [facility]con 344-01 Encounter: [id number]       Report from staff regarding this patient received and discussed, and records reviewed prior to seeing this patient   The patient the still has residual paranoid ideations, guarded, but no longer an acute psychotic distress, tolerates the Invega 9 mg well  Looking forward her Rayandrew Kaminski shot  Visible in the unit but seclusive  At times pacing around the unit  Sleep: improved  Appetite: normal    Medication side effects:no complaints    Mental Status Evaluation:    Appearance:  dressed in hospital attire, improved grooming   Behavior:  cooperative, calm   Mood:  improved, anxious   Affect: constricted    Speech:  normal rate and volume, normal pitch   Thought Process:  concrete   Thought Content:  paranoid ideation   Perceptual Disturbances: denies auditory hallucinations when asked, does not appear responding to internal stimuli   Risk Potential: Suicidal ideation - None, contracts for safety on the unit  Homicidal ideation - None  Potential for aggression - No   Sensorium:  oriented to person, place and time   Memory:  recent and remote memory grossly intact   Consciousness:  alert and awake   Insight:  impaired due to psychosis   Judgment: improving and impaired due to psychosis   Motor Activity: no abnormal movements         Laboratory results:  I have personally reviewed all pertinent laboratory results  BMP: No results for input(s): NA, K, CL, CO2, BUN in the last 72 hours      Invalid input(s): CREA, GLU  Vitals:    02/17/20 1541   BP: 116/73   Pulse: 99   Resp: 16   Temp:    SpO2:         Medication Administration - last 24 hours from 02/16/2020 2110 to 02/17/2020 2110       Date/Time Order Dose Route Action Action by     02/17/2020 0831 fluticasone (FLONASE) 50 mcg/act nasal spray 1 spray 1 spray Each Nare Given Radha Joya RN     02/17/2020 0830 loratadine (CLARITIN) tablet 10 mg 10 mg Oral Given Radha Joya RN     02/17/2020 0830 paliperidone (INVEGA) 24 hr tablet 9 mg 9 mg Oral Given Radha Joya RN              Assessment/Plan   Principal Problem:    Schizoaffective disorder, chronic condition with acute exacerbation ([facility] [address] )  Active Problems:    Elevated BP without diagnosis of hypertension    Medical clearance for psychiatric admission    Leukocytosis    Impaired fasting glucose    Chronic rhinitis          Current Facility-Administered Medications:     acetaminophen (TYLENOL) tablet 650 mg, 650 mg, Oral, Q6H PRN, Cristiana Quarles MD    aluminum-magnesium hydroxide-simethicone (MYLANTA) 200-200-20 mg/5 mL oral suspension 30 mL, 30 mL, Oral, Q4H PRN, Cristiana Quarles MD    benztropine (COGENTIN) injection 1 mg, 1 mg, Intramuscular, Q6H PRN, Cristiana Quarles MD    benztropine (COGENTIN) tablet 1 mg, 1 mg, Oral, Q6H PRN, Cristiana Quarles MD    fluticasone (FLONASE) 50 mcg/act nasal spray 1 spray, 1 spray, Each Nare, Daily, Carlee Che PA-C, 1 spray at 02/17/20 0831    ibuprofen (MOTRIN) tablet 600 mg, 600 mg, Oral, Q8H PRN, Cristiana Quarles MD    loratadine (CLARITIN) tablet 10 mg, 10 mg, Oral, Daily, Carlee Che PA-C, 10 mg at 02/17/20 0830    LORazepam (ATIVAN) injection 2 mg, 2 mg, Intramuscular, Q6H PRN, Cristiana Quarles MD    LORazepam (ATIVAN) tablet 1 mg, 1 mg, Oral, Q8H PRN, Cristiana Quarles MD, 1 mg at 02/16/20 0138    magnesium hydroxide (MILK OF MAGNESIA) 400 mg/5 mL oral suspension 30 mL, 30 mL, Oral, Daily PRN, Cristiana Quarles MD    OLANZapine (ZyPREXA) IM injection 5 mg, 5 mg, Intramuscular, BID PRN, Cristiana Quarles MD    OLANZapine (ZyPREXA) tablet 5 mg, 5 mg, Oral, BID PRN, Cristiana Quarles MD    paliperidone (INVEGA) 24 hr tablet 9 mg, 9 mg, Oral, Daily, Carmen Willson DO, 9 mg at 02/17/20 0830    traZODone (DESYREL) tablet 50 mg, 50 mg, Oral, JAVID CHANN, Eileen Kline MD    Recommended Treatment:   Will not make medication changes today, will provide was oral Invega and later this week with injectable Invega 234 mg  Planned medication and treatment changes: All current active medications have been reviewed  Continue treatment with group therapy, milieu therapy, occupational therapy and medication management  Risks / Benefits of Treatment:    Risks, benefits, and possible side effects of medications explained to patient and patient verbalizes understanding and agreement for treatment  Planned medication and treatment changes: All current active medications have been reviewed  Continue treatment with group therapy, milieu therapy, occupational therapy and medication management  Counseling / Coordination of Care:    Patient's progress discussed with staff in treatment team meeting  ** Please Note: This note has been constructed using a voice recognition system   **      ------------------------------------------------------------------------------------

## 2020-02-18 NOTE — PROGRESS NOTES
Progress Note - [id number] Vicki Foote 54 y o  female MRN: [id number]  Unit/Bed#: Steve Hayes 344-01 Encounter: [id number]    The patient was seen for continuing care and reviewed with treatment team   Patient was seen and office  Per nursing staff, patient was a little suspicious this morning and refused morning blood draw stating that Devorah Fam is not a donor    Inquiring about why she can't use her pharmacy  Inquired if pharmacist had done something wrong  It was explained to patient that this was not the case and that her injection had originally been scheduled at [facility] so that she could also receive her mental health therapy at the same time  Patient voiced understanding  Patient was then informed that she would be receiving her long-acting injectable Invega 234 mg today with plans to discharge to [facility] tomorrow  Patient was agreeable to this plan  Patient's mother Jimbo Tse was contacted to inform her of discharge plans  Patient denies anxiety or depression  Denies SI/HI/AVH  Patient appears to be back to her baseline level of functioning  No delusional material expressed during interview  Patient states that she is eating and sleeping without difficulty      Current Mental Status Evaluation:  Appearance:  Adequate hygiene and grooming and Good eye contact   Behavior:  calm, cooperative and friendly   Mood:  euthymic   Affect: appropriate and mood-congruent   Speech: Normal rate and Normal volume   Thought Process:  Goal directed and coherent   Thought Content:  Does not verbalize delusional material   Perceptual Disturbances: Denies hallucinations and does not appear to be responding to internal stimuli   Risk Potential: No suicidal or homicidal ideation   Orientation:   Alert and oriented x3     Progress Toward Goals:   Principal Problem:    Schizoaffective disorder, chronic condition with acute exacerbation ([facility] [address] )  Active Problems:    Elevated BP without diagnosis of hypertension    Medical clearance for psychiatric admission    Leukocytosis    Impaired fasting glucose    Chronic rhinitis      Recommended Treatment: Continue with pharmacotherapy, group therapy, milieu therapy and occupational therapy  Denies any side effects to medications  Denies feeling depressed or anxious  Denies SI/HI/AVH  Patient appears to be back to baseline and is psychiatrically stable at this time  EKG showed QTC at 440  Plan to discharge tomorrow to [facility] with follow-up appointment scheduled at [address] [place] [address] for 1030 am Thursday  Will give IM Invega and decrease patient's oral dose  Plan for discharge on only 2 weeks of oral Invega  The patient will be maintained on the following medications: Decrease Invega from 9 mg QD to 6 mg QD  Will give patient Invega 234 mg IM      Current Facility-Administered Medications:  acetaminophen 650 mg Oral Q6H PRN Saul Blair MD   aluminum-magnesium hydroxide-simethicone 30 mL Oral Q4H PRN Saul Blair MD   benztropine 1 mg Intramuscular Q6H PRN Anju Kumar MD   benztropine 1 mg Oral Q6H PRN Anju Kumar MD   fluticasone 1 spray Each Nare Daily Carlee Che PA-C   ibuprofen 600 mg Oral Q8H PRN Anju Kumar MD   loratadine 10 mg Oral Daily Carlee Che PA-C   LORazepam 2 mg Intramuscular Q6H PRN Saul Blair MD   LORazepam 1 mg Oral Q8H PRN Saul Blair MD   magnesium hydroxide 30 mL Oral Daily PRN Suresh Blair MD   OLANZapine 5 mg Intramuscular BID PRN Saul Blair MD   OLANZapine 5 mg Oral BID PRN Anju Kumar MD   paliperidone 9 mg Oral Daily Dianna Safer, DO   traZODone 50 mg Oral HS PRN Anju Kumar MD

## 2020-02-18 NOTE — CASE MANAGEMENT
Oscar [place] [id number] called to inform SW that PT was closed to [facility]  Pt was denied housing and case was closed

## 2020-02-18 NOTE — CASE MANAGEMENT
[facility] will be [facility] to [facility]  She has follow up appointment scheduled at [facility] 10:30 Thursday and was referred to Bryan Guerra for [facility]  SW informed Pt of this  Pt stated she knows her mother doesn't believe the warming shelter is a good idea but Pt is confident she will be able to navigate the homeless systems in the [facility] and she will be successful   Pt was in agreement with DC

## 2020-02-18 NOTE — PLAN OF CARE
Problem: Ineffective Coping  Goal: Participates in unit activities  Description  Interventions:  - Provide therapeutic environment   - Provide required programming   - Redirect inappropriate behaviors   Outcome: Progressing   Attend a group within 24 hrs

## 2020-02-18 NOTE — PLAN OF CARE
Pt tentative DC for 2/19/2020  Pt has made progress but continues with paranoid thinking which mother reports as Pt baseline  Pt has fixed delusions of people trespassing and breaking into her room, home, shelter, where ever she resides at the time     Problem: Alteration in Thoughts and Perception  Goal: Verbalize thoughts and feelings  Description  Interventions:  - Promote a nonjudgmental and trusting relationship with the patient through active listening and therapeutic communication  - Assess patient's level of functioning, behavior and potential for risk  - Engage patient in 1 on 1 interactions  - Encourage patient to express fears, feelings, frustrations, and discuss symptoms    - [place] patient to reality, help patient recognize reality-based thinking   - Administer medications as ordered and assess for potential side effects  - Provide the patient education related to the signs and symptoms of the illness and desired effects of prescribed medications  Outcome: Progressing  Goal: Refrain from acting on delusional thinking/internal stimuli  Description  Interventions:  - Monitor patient closely, per order   - Utilize least restrictive measures   - Set reasonable limits, give positive feedback for acceptable   - Administer medications as ordered and monitor of potential side effects  Outcome: Progressing  Goal: Agree to be compliant with medication regime, as prescribed and report medication side effects  Description  Interventions:  - Offer appropriate PRN medication and supervise ingestion; conduct AIMS, as needed   Outcome: Progressing  Goal: Attend and participate in unit activities, including therapeutic, recreational, and educational groups  Description  Interventions:  -Encourage Visitation and family involvement in care  Outcome: Progressing  Goal: Recognize dysfunctional thoughts, communicate reality-based thoughts at the time of discharge  Description  Interventions:  - Provide medication and psycho-education to assist patient in compliance and developing insight into his/her illness   Outcome: Progressing     Problem: DISCHARGE PLANNING  Goal: Discharge to home or other facility with appropriate resources  Description  INTERVENTIONS:  - Identify barriers to discharge w/patient and caregiver  - Arrange for needed discharge resources and transportation as appropriate  - Identify discharge learning needs (meds, wound care, etc )  - Arrange for interpretive services to assist at discharge as needed  - Refer to Case Management Department for coordinating discharge planning if the patient needs post-hospital services based on physician/advanced practitioner order or complex needs related to functional status, cognitive ability, or social support system  Outcome: Progressing     Problem: Ineffective Coping  Goal: Participates in unit activities  Description  Interventions:  - Provide therapeutic environment   - Provide required programming   - Redirect inappropriate behaviors   Outcome: Progressing

## 2020-02-18 NOTE — CASE MANAGEMENT
HEATHER informed mother Elie Bolaños that Pt will be discharged to a warming shelter, has appointment at [facility] on Thursday, and was given IM Invega today  Mother is not happy about Pt being DC to warming shelter but she understands PT has exhausted resources

## 2020-02-18 NOTE — PROGRESS NOTES
Pastoral Care Progress Note    2020  Patient: Bi Loredo : 1975  Admission Date & Time: 2/3/2020 2226  MRN: [id number] [facility]: [id number]                     Chaplaincy Interventions Utilized:   Empowerment: Encouraged self-care    Exploration: Explored spiritual needs & resources    Relationship Building: Cultivated a relationship of care and support and Listened empathically      Chaplaincy Outcomes Achieved:  Developed chaplaincy care plan, Distress reduced, Progressed toward understanding and Spiritual resources utilized    Spiritual Coping Strategies Utilized:   Spiritual practices, Spiritual empowerment and Spiritual conflict      [address] Affiliation multiple   Current Christian Involvement Patient not active with Jewish   Spiritual Beliefs/Perceptions   Concept of God Accepting   Relationship with God Close   Spiritual Strengths strong pete in God   Coping Responses   Patient Coping Open/discussion

## 2020-02-18 NOTE — TREATMENT TEAM
02/18/20 0731   Team Meeting   Meeting Type Daily Rounds   Team Members Present   Team Members Present Physician;Nurse;; Other (Discipline and Name)   Physician Team Member Dr MCGRATH   Nursing Team Member [facility] Management Team Member [id number]   Other (Discipline and Name)    Patient/Family Present   Patient Present No   Patient's Family Present No     Pt will have long acting injection prior to discharge (likely today)  Discharge tomorrow morning to warming shelter

## 2020-02-18 NOTE — PROGRESS NOTES
Pt denies all symptoms  Pt received Invega IM in right deltoid @ 0930  Pt is seclusive to room  Pt is scant, guarded and suspicious in conversation  Will monitor

## 2020-02-19 VITALS
BODY MASS INDEX: 32.44 KG/M2 | DIASTOLIC BLOOD PRESSURE: 81 MMHG | SYSTOLIC BLOOD PRESSURE: 107 MMHG | RESPIRATION RATE: 16 BRPM | TEMPERATURE: 98 F | HEART RATE: 112 BPM | WEIGHT: 190 LBS | OXYGEN SATURATION: 95 % | HEIGHT: 64 IN

## 2020-02-19 PROBLEM — D72.829 LEUKOCYTOSIS: Status: RESOLVED | Noted: 2020-02-04 | Resolved: 2020-02-19

## 2020-02-19 PROBLEM — F25.9 SCHIZOAFFECTIVE DISORDER, CHRONIC CONDITION WITH ACUTE EXACERBATION (HCC): Status: RESOLVED | Noted: 2019-12-18 | Resolved: 2020-02-19

## 2020-02-19 PROBLEM — R03.0 ELEVATED BP WITHOUT DIAGNOSIS OF HYPERTENSION: Status: RESOLVED | Noted: 2019-12-18 | Resolved: 2020-02-19

## 2020-02-19 PROBLEM — Z00.8 MEDICAL CLEARANCE FOR PSYCHIATRIC ADMISSION: Status: RESOLVED | Noted: 2019-12-18 | Resolved: 2020-02-19

## 2020-02-19 PROBLEM — J31.0 CHRONIC RHINITIS: Status: RESOLVED | Noted: 2020-02-04 | Resolved: 2020-02-19

## 2020-02-19 PROBLEM — R73.01 IMPAIRED FASTING GLUCOSE: Status: RESOLVED | Noted: 2020-02-04 | Resolved: 2020-02-19

## 2020-02-19 PROCEDURE — 99238 HOSP IP/OBS DSCHRG MGMT 30/<: CPT | Performed by: PSYCHIATRY & NEUROLOGY

## 2020-02-19 RX ORDER — PALIPERIDONE 3 MG/1
3 TABLET, EXTENDED RELEASE ORAL EVERY MORNING
Qty: 7 TABLET | Refills: 0 | Status: SHIPPED | OUTPATIENT
Start: 2020-02-27 | End: 2021-03-30

## 2020-02-19 RX ORDER — PALIPERIDONE 6 MG/1
6 TABLET, EXTENDED RELEASE ORAL DAILY
Qty: 7 TABLET | Refills: 0 | Status: SHIPPED | OUTPATIENT
Start: 2020-02-19 | End: 2020-02-26

## 2020-02-19 RX ADMIN — PALIPERIDONE 6 MG: 6 TABLET, FILM COATED, EXTENDED RELEASE ORAL at 08:25

## 2020-02-19 RX ADMIN — LORATADINE 10 MG: 10 TABLET ORAL at 08:25

## 2020-02-19 RX ADMIN — FLUTICASONE PROPIONATE 1 SPRAY: 50 SPRAY, METERED NASAL at 08:25

## 2020-02-19 NOTE — PROGRESS NOTES
02/19/20 1100   Activity/Group Checklist   Group   (recovery group)   Attendance Attended   Attendance Duration (min) 46-60   Interactions Interacted appropriately   Affect/Mood Appropriate   Goals Achieved Discussed self-esteem issues; Displayed empathy;Able to listen to others; Able to engage in interactions; Able to self-disclose; Able to recieve feedback; Able to give feedback to another   Maslow hierarchy of needs

## 2020-02-19 NOTE — CASE MANAGEMENT
Pt discharged to [address] [place] [address] via [address]  Pt is in agreement with discharge plan due to having been removed from several housing options due to behaviors  Pt will continue with [address] clinic and received IM 2/18/2020  Pt refused ICM, ACT, or any other services due to believing they are more harmful than good  Pt mother was notified of DC and discharge plan, mother is upset her daughter is homeless but understands housing options have been exhausted, also Pt does receive income and SW encouraged mother to assist Pt in finding room for rent if homelessness is issue

## 2020-02-19 NOTE — PROGRESS NOTES
Patient denies all symptoms and is looking forward to discharge today  Plans on being compliant with her medications and follow up with outpatient appointments

## 2020-02-19 NOTE — DISCHARGE INSTR - LAB
If you smoke, use tobacco or nicotine, and/or are exposed to second hand smoke, you are encouraged to stop to improve your health    If you need help quitting, please talk to your health care provider or call:  · Magali Avila ([phone])  · [facility] ([phone])   · [address] [place] [address] ([phone])
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
## 2020-02-19 NOTE — TREATMENT TEAM
02/19/20 0700   Team Meeting   Meeting Type Daily Rounds   Team Members Present   Team Members Present Physician;Nurse;;; Other (Discipline and Name)   Physician Team Member Mary Kay Thompson, [address]  Team Member Toan   Care Management Team Member Paul Robertson   Social Work Team Member Navigator Lizett Rodriguez   Other (Discipline and Name) Students   Patient/Family Present   Patient Present No   Patient's Family Present No   Patient is for discharge today at noon to [facility] shelter   by Kannan

## 2020-02-19 NOTE — TREATMENT TEAM
02/18/20 1415   Activity/Group Checklist   Group Other (Comment)  (Art Therapy Group/Open Choice, Process Discussion)   Attendance Attended   Attendance Duration (min) Greater than 60   Interactions Interacted appropriately   Affect/Mood Appropriate   Goals Achieved Able to listen to others; Able to engage in interactions; Able to recieve feedback; Able to give feedback to another  (Able to engage materials; full participation/gained insight)

## 2020-02-19 NOTE — PLAN OF CARE
Problem: Alteration in Thoughts and Perception  Goal: Treatment Goal: Gain control of psychotic behaviors/thinking, reduce/eliminate presenting symptoms and demonstrate improved reality functioning upon discharge  Outcome: Completed  Goal: Verbalize thoughts and feelings  Description  Interventions:  - Promote a nonjudgmental and trusting relationship with the patient through active listening and therapeutic communication  - Assess patient's level of functioning, behavior and potential for risk  - Engage patient in 1 on 1 interactions  - Encourage patient to express fears, feelings, frustrations, and discuss symptoms    - Wilmer patient to reality, help patient recognize reality-based thinking   - Administer medications as ordered and assess for potential side effects  - Provide the patient education related to the signs and symptoms of the illness and desired effects of prescribed medications  Outcome: Completed  Goal: Refrain from acting on delusional thinking/internal stimuli  Description  Interventions:  - Monitor patient closely, per order   - Utilize least restrictive measures   - Set reasonable limits, give positive feedback for acceptable   - Administer medications as ordered and monitor of potential side effects  Outcome: Completed  Goal: Agree to be compliant with medication regime, as prescribed and report medication side effects  Description  Interventions:  - Offer appropriate PRN medication and supervise ingestion; conduct AIMS, as needed   Outcome: Completed  Goal: Attend and participate in unit activities, including therapeutic, recreational, and educational groups  Description  Interventions:  -Encourage Visitation and family involvement in care  Outcome: Completed  Goal: Recognize dysfunctional thoughts, communicate reality-based thoughts at the time of discharge  Description  Interventions:  - Provide medication and psycho-education to assist patient in compliance and developing insight into his/her illness   Outcome: Completed     Problem: DISCHARGE PLANNING  Goal: Discharge to home or other facility with appropriate resources  Description  INTERVENTIONS:  - Identify barriers to discharge w/patient and caregiver  - Arrange for needed discharge resources and transportation as appropriate  - Identify discharge learning needs (meds, wound care, etc )  - Arrange for interpretive services to assist at discharge as needed  - Refer to Case Management Department for coordinating discharge planning if the patient needs post-hospital services based on physician/advanced practitioner order or complex needs related to functional status, cognitive ability, or social support system  Outcome: Completed     Problem: Risk for Self Injury/Neglect  Goal: Treatment Goal: Remain safe during length of stay, learn and adopt new coping skills, and be free of self-injurious ideation, impulses and acts at the time of discharge  Outcome: Completed     Problem: Ineffective Coping  Goal: Participates in unit activities  Description  Interventions:  - Provide therapeutic environment   - Provide required programming   - Redirect inappropriate behaviors   Outcome: Completed

## 2020-05-02 ENCOUNTER — OFFICE VISIT (OUTPATIENT)
Dept: URGENT CARE | Age: 45
End: 2020-05-02
Payer: COMMERCIAL

## 2020-05-02 VITALS
HEIGHT: 63 IN | WEIGHT: 180 LBS | DIASTOLIC BLOOD PRESSURE: 87 MMHG | OXYGEN SATURATION: 96 % | BODY MASS INDEX: 31.89 KG/M2 | HEART RATE: 115 BPM | SYSTOLIC BLOOD PRESSURE: 138 MMHG | TEMPERATURE: 98 F | RESPIRATION RATE: 20 BRPM

## 2020-05-02 DIAGNOSIS — R00.0 TACHYCARDIA: ICD-10-CM

## 2020-05-02 DIAGNOSIS — R05.9 COUGH: ICD-10-CM

## 2020-05-02 DIAGNOSIS — R06.00 DYSPNEA, UNSPECIFIED TYPE: Primary | ICD-10-CM

## 2020-05-02 DIAGNOSIS — R42 DIZZINESS: ICD-10-CM

## 2020-05-02 PROCEDURE — 99213 OFFICE O/P EST LOW 20 MIN: CPT | Performed by: PHYSICIAN ASSISTANT

## 2020-05-02 RX ORDER — PALIPERIDONE PALMITATE 156 MG/ML
156 INJECTION INTRAMUSCULAR
COMMUNITY
Start: 2020-04-10

## 2021-01-26 NOTE — PROGRESS NOTES
Patient suspicious this morning  Refused blood work stating that she is "not a donor"  Also questioning why case management wants her to use a different pharmacy  " Why are they being so indirect"? Asking if the pharmacist did something wrong and if he did she would appreciate someone telling her the truth  Patient mainly seclusive to room  Only out for breakfast as of this time  Will monitor  show

## 2021-03-27 ENCOUNTER — APPOINTMENT (EMERGENCY)
Dept: RADIOLOGY | Facility: HOSPITAL | Age: 46
DRG: 193 | End: 2021-03-27
Payer: COMMERCIAL

## 2021-03-27 ENCOUNTER — APPOINTMENT (EMERGENCY)
Dept: CT IMAGING | Facility: HOSPITAL | Age: 46
DRG: 193 | End: 2021-03-27
Payer: COMMERCIAL

## 2021-03-27 ENCOUNTER — HOSPITAL ENCOUNTER (INPATIENT)
Facility: HOSPITAL | Age: 46
LOS: 1 days | Discharge: HOME | DRG: 193 | End: 2021-03-28
Admitting: INTERNAL MEDICINE
Payer: COMMERCIAL

## 2021-03-27 DIAGNOSIS — E87.6 HYPOKALEMIA: ICD-10-CM

## 2021-03-27 DIAGNOSIS — R09.02 HYPOXIA: ICD-10-CM

## 2021-03-27 DIAGNOSIS — R44.3 HALLUCINATION: ICD-10-CM

## 2021-03-27 DIAGNOSIS — F25.0 SCHIZOAFFECTIVE DISORDER, BIPOLAR TYPE (HCC): ICD-10-CM

## 2021-03-27 DIAGNOSIS — J18.9 PNEUMONIA: Primary | ICD-10-CM

## 2021-03-27 PROBLEM — R19.7 DIARRHEA: Status: RESOLVED | Noted: 2021-03-27 | Resolved: 2021-03-27

## 2021-03-27 PROBLEM — J96.01 ACUTE HYPOXEMIC RESPIRATORY FAILURE (HCC): Status: ACTIVE | Noted: 2021-03-27

## 2021-03-27 PROBLEM — R19.7 DIARRHEA: Status: ACTIVE | Noted: 2021-03-27

## 2021-03-27 LAB
ALBUMIN SERPL BCP-MCNC: 3.7 G/DL (ref 3.4–4.8)
ALP SERPL-CCNC: 48.8 U/L (ref 35–140)
ALT SERPL W P-5'-P-CCNC: 28 U/L (ref 5–54)
ANION GAP SERPL CALCULATED.3IONS-SCNC: 11 MMOL/L (ref 4–13)
AST SERPL W P-5'-P-CCNC: 57 U/L (ref 15–41)
BACTERIA UR QL AUTO: NORMAL /HPF
BASOPHILS # BLD MANUAL: 0 THOUSAND/UL (ref 0–0.1)
BASOPHILS NFR MAR MANUAL: 0 % (ref 0–1)
BILIRUB SERPL-MCNC: 0.52 MG/DL (ref 0.3–1.2)
BILIRUB UR QL STRIP: NEGATIVE
BUN SERPL-MCNC: 6 MG/DL (ref 6–20)
CALCIUM SERPL-MCNC: 8.2 MG/DL (ref 8.4–10.2)
CHLORIDE SERPL-SCNC: 92 MMOL/L (ref 96–108)
CLARITY UR: CLEAR
CO2 SERPL-SCNC: 31 MMOL/L (ref 22–33)
COLOR UR: YELLOW
CREAT SERPL-MCNC: 0.76 MG/DL (ref 0.4–1.1)
EOSINOPHIL # BLD MANUAL: 0.07 THOUSAND/UL (ref 0–0.4)
EOSINOPHIL NFR BLD MANUAL: 1 % (ref 0–6)
ERYTHROCYTE [DISTWIDTH] IN BLOOD BY AUTOMATED COUNT: 13.7 % (ref 11.6–15.1)
ERYTHROCYTE [SEDIMENTATION RATE] IN BLOOD: 17 MM/HOUR (ref 0–20)
FLUAV RNA RESP QL NAA+PROBE: NEGATIVE
FLUBV RNA RESP QL NAA+PROBE: NEGATIVE
GFR SERPL CREATININE-BSD FRML MDRD: 94 ML/MIN/1.73SQ M
GLUCOSE SERPL-MCNC: 151 MG/DL (ref 65–140)
GLUCOSE UR STRIP-MCNC: NEGATIVE MG/DL
HCT VFR BLD AUTO: 41.1 % (ref 34.8–46.1)
HGB BLD-MCNC: 14 G/DL (ref 11.5–15.4)
HGB UR QL STRIP.AUTO: ABNORMAL
KETONES UR STRIP-MCNC: NEGATIVE MG/DL
LEUKOCYTE ESTERASE UR QL STRIP: NEGATIVE
LYMPHOCYTES # BLD AUTO: 1.35 THOUSAND/UL (ref 0.6–4.47)
LYMPHOCYTES # BLD AUTO: 19 % (ref 14–44)
MCH RBC QN AUTO: 27.4 PG (ref 26.8–34.3)
MCHC RBC AUTO-ENTMCNC: 34.1 G/DL (ref 31.4–37.4)
MCV RBC AUTO: 80 FL (ref 82–98)
MONOCYTES # BLD AUTO: 0.14 THOUSAND/UL (ref 0–1.22)
MONOCYTES NFR BLD: 2 % (ref 4–12)
NEUTROPHILS # BLD MANUAL: 5.52 THOUSAND/UL (ref 1.85–7.62)
NEUTS BAND NFR BLD MANUAL: 1 % (ref 0–8)
NEUTS SEG NFR BLD AUTO: 77 % (ref 43–75)
NITRITE UR QL STRIP: NEGATIVE
NON-SQ EPI CELLS URNS QL MICRO: NORMAL /HPF
PH UR STRIP.AUTO: 5.5 [PH]
PLATELET # BLD AUTO: 224 THOUSANDS/UL (ref 149–390)
PLATELET BLD QL SMEAR: ADEQUATE
PMV BLD AUTO: 10 FL (ref 8.9–12.7)
POTASSIUM SERPL-SCNC: 2.8 MMOL/L (ref 3.5–5)
PROT SERPL-MCNC: 7.1 G/DL (ref 6.4–8.3)
PROT UR STRIP-MCNC: NEGATIVE MG/DL
RBC # BLD AUTO: 5.11 MILLION/UL (ref 3.81–5.12)
RBC #/AREA URNS AUTO: NORMAL /HPF
RBC MORPH BLD: NORMAL
RSV RNA RESP QL NAA+PROBE: NEGATIVE
SARS-COV-2 RNA RESP QL NAA+PROBE: NEGATIVE
SODIUM SERPL-SCNC: 134 MMOL/L (ref 133–145)
SP GR UR STRIP.AUTO: <=1.005 (ref 1–1.03)
TOTAL CELLS COUNTED SPEC: 100
UROBILINOGEN UR QL STRIP.AUTO: 0.2 E.U./DL
WBC # BLD AUTO: 7.08 THOUSAND/UL (ref 4.31–10.16)
WBC #/AREA URNS AUTO: NORMAL /HPF

## 2021-03-27 PROCEDURE — 99223 1ST HOSP IP/OBS HIGH 75: CPT | Performed by: INTERNAL MEDICINE

## 2021-03-27 PROCEDURE — 0241U HB NFCT DS VIR RESP RNA 4 TRGT: CPT | Performed by: PHYSICIAN ASSISTANT

## 2021-03-27 PROCEDURE — 71045 X-RAY EXAM CHEST 1 VIEW: CPT

## 2021-03-27 PROCEDURE — 71275 CT ANGIOGRAPHY CHEST: CPT

## 2021-03-27 PROCEDURE — 96361 HYDRATE IV INFUSION ADD-ON: CPT

## 2021-03-27 PROCEDURE — 99285 EMERGENCY DEPT VISIT HI MDM: CPT | Performed by: PHYSICIAN ASSISTANT

## 2021-03-27 PROCEDURE — 94640 AIRWAY INHALATION TREATMENT: CPT

## 2021-03-27 PROCEDURE — 81001 URINALYSIS AUTO W/SCOPE: CPT | Performed by: INTERNAL MEDICINE

## 2021-03-27 PROCEDURE — 36415 COLL VENOUS BLD VENIPUNCTURE: CPT | Performed by: PHYSICIAN ASSISTANT

## 2021-03-27 PROCEDURE — G1004 CDSM NDSC: HCPCS

## 2021-03-27 PROCEDURE — 80053 COMPREHEN METABOLIC PANEL: CPT | Performed by: PHYSICIAN ASSISTANT

## 2021-03-27 PROCEDURE — 99285 EMERGENCY DEPT VISIT HI MDM: CPT

## 2021-03-27 PROCEDURE — 87040 BLOOD CULTURE FOR BACTERIA: CPT | Performed by: PHYSICIAN ASSISTANT

## 2021-03-27 PROCEDURE — 85027 COMPLETE CBC AUTOMATED: CPT | Performed by: PHYSICIAN ASSISTANT

## 2021-03-27 PROCEDURE — 96367 TX/PROPH/DG ADDL SEQ IV INF: CPT

## 2021-03-27 PROCEDURE — 85652 RBC SED RATE AUTOMATED: CPT | Performed by: INTERNAL MEDICINE

## 2021-03-27 PROCEDURE — 94760 N-INVAS EAR/PLS OXIMETRY 1: CPT

## 2021-03-27 PROCEDURE — 96365 THER/PROPH/DIAG IV INF INIT: CPT

## 2021-03-27 PROCEDURE — 85007 BL SMEAR W/DIFF WBC COUNT: CPT | Performed by: PHYSICIAN ASSISTANT

## 2021-03-27 RX ORDER — NICOTINE 21 MG/24HR
1 PATCH, TRANSDERMAL 24 HOURS TRANSDERMAL DAILY
Status: DISCONTINUED | OUTPATIENT
Start: 2021-03-28 | End: 2021-03-28 | Stop reason: HOSPADM

## 2021-03-27 RX ORDER — CEFTRIAXONE 1 G/50ML
1000 INJECTION, SOLUTION INTRAVENOUS EVERY 24 HOURS
Status: DISCONTINUED | OUTPATIENT
Start: 2021-03-28 | End: 2021-03-28

## 2021-03-27 RX ORDER — IPRATROPIUM BROMIDE AND ALBUTEROL SULFATE 2.5; .5 MG/3ML; MG/3ML
3 SOLUTION RESPIRATORY (INHALATION)
Status: DISCONTINUED | OUTPATIENT
Start: 2021-03-27 | End: 2021-03-27

## 2021-03-27 RX ORDER — POTASSIUM CHLORIDE 20 MEQ/1
40 TABLET, EXTENDED RELEASE ORAL ONCE
Status: COMPLETED | OUTPATIENT
Start: 2021-03-27 | End: 2021-03-27

## 2021-03-27 RX ORDER — GUAIFENESIN/DEXTROMETHORPHAN 100-10MG/5
10 SYRUP ORAL EVERY 4 HOURS PRN
Status: DISCONTINUED | OUTPATIENT
Start: 2021-03-27 | End: 2021-03-28 | Stop reason: HOSPADM

## 2021-03-27 RX ORDER — ESZOPICLONE 1 MG/1
1 TABLET, FILM COATED ORAL
Status: DISCONTINUED | OUTPATIENT
Start: 2021-03-27 | End: 2021-03-27

## 2021-03-27 RX ORDER — LEVALBUTEROL 1.25 MG/.5ML
1.25 SOLUTION, CONCENTRATE RESPIRATORY (INHALATION)
Status: DISCONTINUED | OUTPATIENT
Start: 2021-03-27 | End: 2021-03-28 | Stop reason: HOSPADM

## 2021-03-27 RX ORDER — LANOLIN ALCOHOL/MO/W.PET/CERES
3 CREAM (GRAM) TOPICAL
Status: DISCONTINUED | OUTPATIENT
Start: 2021-03-27 | End: 2021-03-28 | Stop reason: HOSPADM

## 2021-03-27 RX ORDER — CEFTRIAXONE 1 G/50ML
1000 INJECTION, SOLUTION INTRAVENOUS ONCE
Status: COMPLETED | OUTPATIENT
Start: 2021-03-27 | End: 2021-03-27

## 2021-03-27 RX ADMIN — CEFTRIAXONE 1000 MG: 1 INJECTION, SOLUTION INTRAVENOUS at 14:49

## 2021-03-27 RX ADMIN — LEVALBUTEROL HYDROCHLORIDE 1.25 MG: 1.25 SOLUTION, CONCENTRATE RESPIRATORY (INHALATION) at 20:17

## 2021-03-27 RX ADMIN — POTASSIUM CHLORIDE 40 MEQ: 1500 TABLET, EXTENDED RELEASE ORAL at 14:52

## 2021-03-27 RX ADMIN — IOHEXOL 85 ML: 350 INJECTION, SOLUTION INTRAVENOUS at 15:33

## 2021-03-27 RX ADMIN — POTASSIUM CHLORIDE 40 MEQ: 1500 TABLET, EXTENDED RELEASE ORAL at 21:07

## 2021-03-27 RX ADMIN — IPRATROPIUM BROMIDE AND ALBUTEROL SULFATE 3 ML: 2.5; .5 SOLUTION RESPIRATORY (INHALATION) at 17:39

## 2021-03-27 RX ADMIN — IPRATROPIUM BROMIDE 0.5 MG: 0.5 SOLUTION RESPIRATORY (INHALATION) at 20:17

## 2021-03-27 RX ADMIN — GUAIFENESIN AND DEXTROMETHORPHAN 10 ML: 100; 10 SYRUP ORAL at 21:07

## 2021-03-27 RX ADMIN — SODIUM CHLORIDE 1000 ML: 0.9 INJECTION, SOLUTION INTRAVENOUS at 14:03

## 2021-03-27 RX ADMIN — AZITHROMYCIN MONOHYDRATE 500 MG: 500 INJECTION, POWDER, LYOPHILIZED, FOR SOLUTION INTRAVENOUS at 15:09

## 2021-03-27 NOTE — ASSESSMENT & PLAN NOTE
Patient reports smoking almost 1 pack since she was 20years old    Order nicotine patches  Smoking cessation counseling

## 2021-03-27 NOTE — ASSESSMENT & PLAN NOTE
Patient requiring 1-2 L of oxygen  Suspect secondary to underlying infiltrates-infectious versus autoimmune    On antibiotics  Consider pulmonary medicine consult

## 2021-03-27 NOTE — ASSESSMENT & PLAN NOTE
Patient requiring 1-2 L of oxygen for a brief episode  Suspect secondary to underlying infiltrates-infectious versus autoimmune    On antibiotics  She is currently saturating well on room air  Acute hypoxic respiratory failure resolved

## 2021-03-27 NOTE — ASSESSMENT & PLAN NOTE
Patient reports smoking almost 1 pack since she was 20years old    Will prescribe nicotine patches  Smoking cessation counseling

## 2021-03-27 NOTE — ED PROVIDER NOTES
History  Chief Complaint   Patient presents with    Medical Problem     Pt states "I think I have been poisened" the land lord sprayed for bed bugs on the , ever since she has been sob off and on     Pt with PMH: Schizoaffective disorder, no PSH  Presents to ED c/o facial/sinus pain/pressure, headache, cough, sob, no fever, no sore throat, no cp, no abd pain, some + NV, no bowel changes, no rash concerned about reaction to bed bug spray from 3/21  Prior to Admission Medications   Prescriptions Last Dose Informant Patient Reported? Taking? INVEGA SUSTENNA 156 MG/ML IM injection Past Month at Unknown time  Yes Yes   carbamide peroxide (DEBROX) 6 5 % otic solution Not Taking at Unknown time  No No   Sig: Administer 5 drops into the left ear 2 (two) times a day   Patient not taking: Reported on 2020   fluticasone (FLONASE) 50 mcg/act nasal spray   No No   Si spray into each nostril daily   paliperidone (INVEGA) 3 mg 24 hr tablet   No No   Sig: Take 1 tablet (3 mg total) by mouth every morning for 7 days   paliperidone palmitate ER (INVEGA) 234 mg/1 5 mL IM injection Not Taking at Unknown time  No No   Sig: Inject 1 5 mL (234 mg total) into a muscle every 30 (thirty) days   Patient not taking: Reported on 2020   sodium chloride (OCEAN) 0 65 % nasal spray   No No   Si spray into each nostril every hour as needed for congestion      Facility-Administered Medications: None       Past Medical History:   Diagnosis Date    Schizoaffective disorder ([facility] [address] )        No past surgical history on file  Family History   Family history unknown: Yes     I have reviewed and agree with the history as documented      E-Cigarette/Vaping    E-Cigarette Use Never User      E-Cigarette/Vaping Substances     Social History     Tobacco Use    Smoking status: Current Every Day Smoker     Packs/day: 3 00    Smokeless tobacco: Never Used    Tobacco comment: Pt states 18 cigarettes per day   Substance Use Topics    Alcohol use: Never     Frequency: Never     Binge frequency: Never    Drug use: No       Review of Systems   Constitutional: Negative for chills and fever  HENT: Positive for congestion, rhinorrhea, sinus pressure and sinus pain  Negative for ear pain, hearing loss, mouth sores, nosebleeds, sore throat and trouble swallowing  Eyes: Negative for discharge and visual disturbance  Respiratory: Positive for cough and shortness of breath  Cardiovascular: Negative for chest pain and leg swelling  Gastrointestinal: Positive for diarrhea, nausea and vomiting  Negative for abdominal pain  Genitourinary: Negative for dysuria, frequency, genital sores, vaginal bleeding and vaginal discharge  Musculoskeletal: Negative for arthralgias and myalgias  Skin: Negative for color change and pallor  Neurological: Negative for dizziness and weakness  Psychiatric/Behavioral: Negative for behavioral problems  All other systems reviewed and are negative  Physical Exam  Physical Exam  Vitals signs and nursing note reviewed  Constitutional:       General: She is in acute distress  Appearance: She is well-developed  HENT:      Head: Normocephalic and atraumatic  Right Ear: External ear normal       Left Ear: External ear normal       Nose: Congestion present  Mouth/Throat:      Mouth: Mucous membranes are moist       Pharynx: Oropharynx is clear  Eyes:      Conjunctiva/sclera: Conjunctivae normal    Neck:      Musculoskeletal: Normal range of motion  Cardiovascular:      Rate and Rhythm: Regular rhythm  Tachycardia present  Pulmonary:      Effort: Respiratory distress present  Breath sounds: Rhonchi (scattered) present  Abdominal:      General: Bowel sounds are normal       Palpations: Abdomen is soft  Tenderness: There is no abdominal tenderness  There is no right CVA tenderness or left CVA tenderness  Musculoskeletal: Normal range of motion           General: No tenderness  Right lower leg: No edema  Left lower leg: No edema  Lymphadenopathy:      Cervical: No cervical adenopathy  Skin:     General: Skin is warm and dry  Findings: No lesion or rash  Neurological:      General: No focal deficit present  Mental Status: She is alert and oriented to person, place, and time  Motor: No weakness     Psychiatric:         Behavior: Behavior normal          Vital Signs  ED Triage Vitals   Temperature Pulse Respirations Blood Pressure SpO2   03/27/21 1326 03/27/21 1326 03/27/21 1326 03/27/21 1332 03/27/21 1326   98 3 °F (36 8 °C) (!) 110 21 149/60 92 %      Temp Source Heart Rate Source Patient Position - Orthostatic VS BP Location FiO2 (%)   03/27/21 1326 03/27/21 1326 03/27/21 1326 03/27/21 1326 --   Oral Monitor Lying Right arm       Pain Score       03/27/21 1700       4           Vitals:    03/27/21 1326 03/27/21 1332 03/27/21 1509 03/27/21 1700   BP:  149/60 138/68 119/79   Pulse: (!) 110  98 98   Patient Position - Orthostatic VS: Lying  Lying Lying         Visual Acuity      ED Medications  Medications   nicotine (NICODERM CQ) 14 mg/24hr TD 24 hr patch 1 patch (has no administration in time range)   enoxaparin (LOVENOX) subcutaneous injection 40 mg (has no administration in time range)   cefTRIAXone (ROCEPHIN) IVPB (premix in dextrose) 1,000 mg 50 mL (has no administration in time range)   azithromycin (ZITHROMAX) 500 mg in sodium chloride 0 9% 250mL IVPB 500 mg (has no administration in time range)   potassium chloride (K-DUR,KLOR-CON) CR tablet 40 mEq (has no administration in time range)   levalbuterol (XOPENEX) inhalation solution 1 25 mg (has no administration in time range)   ipratropium (ATROVENT) 0 02 % inhalation solution 0 5 mg (has no administration in time range)   melatonin tablet 3 mg (has no administration in time range)   sodium chloride 0 9 % bolus 1,000 mL (1,000 mL Intravenous New Bag 3/27/21 1403)   cefTRIAXone (ROCEPHIN) IVPB (premix in dextrose) 1,000 mg 50 mL (0 mg Intravenous Stopped 3/27/21 1505)   azithromycin (ZITHROMAX) 500 mg in sodium chloride 0 9% 250mL IVPB 500 mg (0 mg Intravenous Stopped 3/27/21 1559)   potassium chloride (K-DUR,KLOR-CON) CR tablet 40 mEq (40 mEq Oral Given 3/27/21 1452)   iohexol (OMNIPAQUE) 350 MG/ML injection (SINGLE-DOSE) 85 mL (85 mL Intravenous Given 3/27/21 1533)       Diagnostic Studies  Results Reviewed     Procedure Component Value Units Date/Time    Sedimentation rate, automated [[id number]]  (Normal) Collected: 03/27/21 1355    Lab Status: Final result Specimen: Blood from Arm, Right Updated: 03/27/21 1650     Sed Rate 17 mm/hour     CBC and differential [[id number]]  (Abnormal) Collected: 03/27/21 1355    Lab Status: Final result Specimen: Blood from Arm, Right Updated: 03/27/21 1531     WBC 7 08 Thousand/uL      RBC 5 11 Million/uL      Hemoglobin 14 0 g/dL      Hematocrit 41 1 %      MCV 80 fL      MCH 27 4 pg      MCHC 34 1 g/dL      RDW 13 7 %      MPV 10 0 fL      Platelets 238 Thousands/uL     Narrative: This is an appended report  These results have been appended to a previously verified report  Manual Differential(PHLEBS Do Not Order) [[id number]]  (Abnormal) Collected: 03/27/21 1355    Lab Status: Final result Specimen: Blood from Arm, Right Updated: 03/27/21 1531     Segmented % 77 %      Bands % 1 %      Lymphocytes % 19 %      Monocytes % 2 %      Eosinophils, % 1 %      Basophils % 0 %      Absolute Neutrophils 5 52 Thousand/uL      Lymphocytes Absolute 1 35 Thousand/uL      Monocytes Absolute 0 14 Thousand/uL      Eosinophils Absolute 0 07 Thousand/uL      Basophils Absolute 0 00 Thousand/uL      Total Counted 100     RBC Morphology Normal     Platelet Estimate Adequate    Blood culture #2 [[id number]] Collected: 03/27/21 1445    Lab Status:  In process Specimen: Blood from Arm, Right Updated: 03/27/21 1450    Blood culture #1 [[id number]] Collected: 03/27/21 1445    Lab Status: In process Specimen: Blood from Arm, Right Updated: 03/27/21 1449    COVID[facility], [facility]/B, [facility] [[id number]]  (Normal) Collected: 03/27/21 1355    Lab Status: Final result Specimen: Nares from Nasopharyngeal Swab Updated: 03/27/21 1444     SARS-CoV-2 Negative     INFLUENZA A PCR Negative     INFLUENZA B PCR Negative     RSV PCR Negative    Narrative: This test has been authorized by FDA under an EUA (Emergency Use Assay) for use by authorized laboratories  Clinical caution and judgement should be used with the interpretation of these results with consideration of the clinical impression and other laboratory testing  Testing reported as "Positive" or "Negative" has been proven to be accurate according to standard laboratory validation requirements  All testing is performed with control materials showing appropriate reactivity at standard intervals      Comprehensive metabolic panel [[id number]]  (Abnormal) Collected: 03/27/21 1355    Lab Status: Final result Specimen: Blood from Arm, Right Updated: 03/27/21 1420     Sodium 134 mmol/L      Potassium 2 8 mmol/L      Chloride 92 mmol/L      CO2 31 mmol/L      ANION GAP 11 mmol/L      BUN 6 mg/dL      Creatinine 0 76 mg/dL      Glucose 151 mg/dL      Calcium 8 2 mg/dL      AST 57 U/L      ALT 28 U/L      Alkaline Phosphatase 48 8 U/L      Total Protein 7 1 g/dL      Albumin 3 7 g/dL      Total Bilirubin 0 52 mg/dL      eGFR 94 ml/min/1 73sq m     Narrative:      [facility] guidelines for Chronic Kidney Disease (CKD):     Stage 1 with normal or high GFR (GFR > 90 mL/min/1 73 square meters)    Stage 2 Mild CKD (GFR = 60-89 mL/min/1 73 square meters)    Stage 3A Moderate CKD (GFR = 45-59 mL/min/1 73 square meters)    Stage 3B Moderate CKD (GFR = 30-44 mL/min/1 73 square meters)    Stage 4 Severe CKD (GFR = 15-29 mL/min/1 73 square meters)    Stage 5 End Stage CKD (GFR <15 mL/min/1 73 square meters)  Note: GFR calculation is accurate only with a steady state creatinine    UA w Reflex to Microscopic w Reflex to Culture [[id number]]     Lab Status: No result Specimen: Urine, Clean Catch     POCT pregnancy, urine [[id number]]     Lab Status: No result                  CTA ED chest PE study   Final Result by Wilberto Jasso MD (03/27 1538)      No pulmonary embolism  Bilateral areas of consolidation most severe in the anterobasal segment of the right lower lobe consistent with multilobar pneumonia  Extensive mediastinal and bilateral hilar reactive lymphadenopathy  Workstation performed: [id number]         XR chest 1 view portable   ED Interpretation by Man Frankel PA-C (03/27 7895)   Rll pneumonia                 Procedures  Procedures         ED Course  ED Course as of Mar 27 1948   Sat Mar 27, 2021   1552 CTA: IMPRESSION:   No pulmonary embolism    Bilateral areas of consolidation most severe in the anterobasal segment of the right lower lobe consistent with multilobar pneumonia      Extensive mediastinal and bilateral hilar reactive lymphadenopathy                                                MDM  Number of Diagnoses or Management Options     Amount and/or Complexity of Data Reviewed  Clinical lab tests: ordered and reviewed  Tests in the radiology section of CPT®: ordered and reviewed  Review and summarize past medical records: yes  Discuss the patient with other providers: yes        Disposition  Final diagnoses:   Pneumonia - multilobar   Hypoxia   Hypokalemia     Time reflects when diagnosis was documented in both MDM as applicable and the Disposition within this note     Time User Action Codes Description Comment    3/27/2021  3:37 PM Chris Fregoso [J18 9] Pneumonia     3/27/2021  3:37 PM Ina Callejas [R09 02] Hypoxia     3/27/2021  3:37 PM Clerebecca Prey [E87 6] Hypokalemia     3/27/2021  3:53 PM Louis Joshi [J18 9] Pneumonia multilobar    3/27/2021  4:31 PM Renetta Callejas [F25 0] Schizoaffective disorder, bipolar type ([facility] [address] )     3/27/2021  4:32 PM Bereggiemari Sumit Add [R44 3] Hallucination       ED Disposition     ED Disposition Condition Date/Time Comment    Admit Stable Sat Mar 27, 2021  3:37 PM Case was discussed with Greg Suero and the patient's admission status was agreed to be Admission Status: inpatient status to the service of Dr Greg Suero   Follow-up Information    None         Current Discharge Medication List      CONTINUE these medications which have NOT CHANGED    Details   INVEGA SUSTENNA 156 MG/ML IM injection       carbamide peroxide (DEBROX) 6 5 % otic solution Administer 5 drops into the left ear 2 (two) times a day  Qty: 15 mL, Refills: 0    Associated Diagnoses: Excess ear wax      fluticasone (FLONASE) 50 mcg/act nasal spray 1 spray into each nostril daily  Qty: 1 Bottle, Refills: 0    Associated Diagnoses: Allergies      paliperidone (INVEGA) 3 mg 24 hr tablet Take 1 tablet (3 mg total) by mouth every morning for 7 days  Qty: 7 tablet, Refills: 0    Associated Diagnoses: Schizoaffective disorder (HCC)      paliperidone palmitate ER (INVEGA) 234 mg/1 5 mL IM injection Inject 1 5 mL (234 mg total) into a muscle every 30 (thirty) days  Qty: 1 Syringe, Refills: 0    Associated Diagnoses: Schizoaffective disorder, chronic condition with acute exacerbation (HCC)      sodium chloride (OCEAN) 0 65 % nasal spray 1 spray into each nostril every hour as needed for congestion  Qty: 15 mL, Refills: 0    Associated Diagnoses: Allergies           No discharge procedures on file      PDMP Review     None          ED Provider  Electronically Signed by           Diego Lynn PA-C  03/27/21 1949

## 2021-03-27 NOTE — ASSESSMENT & PLAN NOTE
Patient presents with ongoing cough episodes since past few years, worsening shortness of breath  Patient received ceftriaxone and azithromycin in the ED, will continue with the same  Patient is negative for COVID-19, no reported exposure  CT scan findings- Bilateral areas of consolidation most severe in the anterobasal segment of the right lower lobe consistent with multilobar pneumonia  Extensive mediastinal and bilateral hilar reactive lymphadenopathy  American female, concerns for sarcoidosis given hilar lymphadenopathy  Also risks for atypical infection, will test for HIV  Blood cultures obtained in the ED  Pending report  Patient is afebrile, requiring 1-2 L of oxygen via nasal cannula for a brief episode  Currently she is saturating well on room air  Patient reports subjectively she is improved  Patient is eager to go home  She is very suspicious for surroundings and medical staffs  She reports she will get antibiotics and go home  Explained her medical condition and offer to stay one more night for IV antibiotics however she declined  Discharge home with azithromycin and Keflex  Recommended to follow-up with primary care physician as an outpatient  She reports she does not have primary care physician and she would like to see only [facility] American physician who born in [facility]

## 2021-03-27 NOTE — ASSESSMENT & PLAN NOTE
Potassium of 2 8 on admission  Replaced with 40+ 40 meq of potassium  Underlying etiology could be diarrhea  Repeat potassium 3 2, replaced with 40 mEq

## 2021-03-27 NOTE — ASSESSMENT & PLAN NOTE
Patient has history of schizoaffective disorder, patient is on Invega injections  Patient gets this injection every 30 days, last injection on 18th March as per as patient    Will consult Psychiatry

## 2021-03-27 NOTE — H&P
Kaveh BAILEY  65   H&P- Candi Li 1975, 51 y o  female MRN: [id number]  Unit/Bed#: -01 Encounter: [id number]  Primary Care Provider: No primary care provider on file  Date and time admitted to hospital: 3/27/2021  1:18 PM    Multifocal pneumonia  Assessment & Plan  Patient presents with ongoing cough episodes since past few years, worsening shortness of breath  Patient received ceftriaxone and azithromycin in the ED, will continue with the same  Patient is negative for COVID-19, no reported exposure  CT scan findings- Bilateral areas of consolidation most severe in the anterobasal segment of the right lower lobe consistent with multilobar pneumonia  Extensive mediastinal and bilateral hilar reactive lymphadenopathy  American female, concerns for sarcoidosis given hilar lymphadenopathy  Also risks for atypical infection, will test for HIV  Blood cultures obtained in the ED  Patient is afebrile, requiring 1-2 L of oxygen via nasal cannula  Consider consult to Pulmonary Medicine  Hallucination  Assessment & Plan  Patient is constantly suspicious of her surroundings  patient suspected her landlord had sprayed her for bed bugs  Will consult psychiatry      Hypokalemia  Assessment & Plan  Potassium of 2 8 on admission  Replaced with 40+ 40 meq of potassium  Underlying etiology could be diarrhea  Acute hypoxemic respiratory failure [facility])  Assessment & Plan  Patient requiring 1-2 L of oxygen  Suspect secondary to underlying infiltrates-infectious versus autoimmune  On antibiotics  Consider pulmonary medicine consult    Tobacco abuse  Assessment & Plan  Patient reports smoking almost 1 pack since she was 20years old  Will prescribe nicotine patches  Smoking cessation counseling    Schizoaffective disorder [facility])  Assessment & Plan  Patient has history of schizoaffective disorder, patient is on Invega injections    Patient gets this injection every 30 days, last injection on 18th March as per as patient  Will consult Psychiatry      VTE Prophylaxis: Enoxaparin (Lovenox)  / sequential compression device   Code Status: FULL CODE  POLST: There is no POLST form on file for this patient (pre-hospital)  Discussion with family: NO    Anticipated Length of Stay:  Patient will be admitted on an Inpatient basis with an anticipated length of stay of  > 2 midnights  Justification for Hospital Stay: MULTIFOCAL PNEUMONIA    Total Time for Visit, including Counseling / Coordination of Care: 45 minutes  Greater than 50% of this total time spent on direct patient counseling and coordination of care  Chief Complaint:  Cough/shortness of breath    History of Present Illness: This is a 51years old female with past medical history of schizoaffective disorder who comes into the ED with chief complaints of cough, headache, shortness of breath  Patient reports that she is been having this cough since past few years, cough is sometimes productive and sometimes nonproductive in nature  Currently patient reports cough is nonproductive  Patient also reports that she is getting more short of breath, patient is not able to quantify with what she gets short of breath  Patient does not report any contact to any sick individual   Patient reports for her repetitive cough she followed up with primary care physicians, she was prescribed nasal steroids and that have not helped her  Overall patient is a very poor historian given schizoaffective disorder  Although patient reports she got her 4 weekly shot on March 18th but she does not follow with her psychiatrist anymore  Vital signs are stable, saturating 92% on room air  Labs on admission-no evidence of leukocytosis, patient is negative for COVID-19  Potassium down to 2 8, AST -57  Chest X ray on admission-not good exposure, infiltrates appreciated over bilateral lung fields?     Review of Systems:    Review of Systems   Constitutional: Negative for activity change and appetite change  HENT: Negative for congestion and dental problem  Respiratory: Positive for cough and shortness of breath  Negative for stridor  Cardiovascular: Negative for chest pain, palpitations and leg swelling  Gastrointestinal: Negative for abdominal distention and abdominal pain  Endocrine: Negative for cold intolerance and heat intolerance  Genitourinary: Negative for difficulty urinating, dysuria, enuresis and flank pain  Allergic/Immunologic: Negative for environmental allergies  Neurological: Negative for seizures and speech difficulty  Psychiatric/Behavioral: Positive for hallucinations  Negative for self-injury, sleep disturbance and suicidal ideas  The patient is nervous/anxious  Past Medical and Surgical History:     Past Medical History:   Diagnosis Date    Schizoaffective disorder ([facility] [address] )        No past surgical history on file  Meds/Allergies:    Prior to Admission medications    Medication Sig Start Date End Date Taking? Authorizing Provider   carbamide peroxide (DEBROX) 6 5 % otic solution Administer 5 drops into the left ear 2 (two) times a day  Patient not taking: Reported on 2/5/2020 12/31/19   RALPH Galvan   fluticasone [facility]) 50 mcg/act nasal spray 1 spray into each nostril daily 1/1/20   RALPH Galvan   INVEGA SUSTENNA 156 MG/ML IM injection  4/10/20   Historical Provider, MD   paliperidone (INVEGA) 3 mg 24 hr tablet Take 1 tablet (3 mg total) by mouth every morning for 7 days 2/27/20 3/5/20  Sydney Torres DO   paliperidone palmitate ER (INVEGA) 234 mg/1 5 mL IM injection Inject 1 5 mL (234 mg total) into a muscle every 30 (thirty) days  Patient not taking: Reported on 5/2/2020 3/18/20   Sydney Torres DO   sodium chloride (OCEAN) 0 65 % nasal spray 1 spray into each nostril every hour as needed for congestion 12/31/19   RALPH Galvan     I have reviewed home medications with patient personally      Allergies: Allergies   Allergen Reactions    Risperidone Shortness Of Breath     Previously tolerated Marianela Flaming and Invega tablets without a problem   Bacitracin     Haloperidol Other (See Comments)     Tardive Dyskinesia      Zinc        Social History:     Marital Status: Single       Substance Use History:   Social History     Substance and Sexual Activity   Alcohol Use No    Frequency: Never    Binge frequency: Never     Social History     Tobacco Use   Smoking Status Current Every Day Smoker    Packs/day: 3 00   Smokeless Tobacco Never Used   Tobacco Comment    Pt states 18 cigarettes per day     Social History     Substance and Sexual Activity   Drug Use No       Family History:    non-contributory    Physical Exam:     Vitals:   Blood Pressure: 138/68 (03/27/21 1509)  Pulse: 98 (03/27/21 1509)  Temperature: 98 3 °F (36 8 °C) (03/27/21 1326)  Temp Source: Oral (03/27/21 1326)  Respirations: 20 (03/27/21 1509)  Weight - Scale: 81 6 kg (180 lb) (03/27/21 1326)  SpO2: 95 % (03/27/21 1509)    Physical Exam  Constitutional:       Comments: Patient's bed and clothes soiled with urine   HENT:      Head: Normocephalic and atraumatic  Eyes:      Pupils: Pupils are equal, round, and reactive to light  Neck:      Musculoskeletal: Normal range of motion  Cardiovascular:      Rate and Rhythm: Normal rate and regular rhythm  Pulmonary:      Effort: Pulmonary effort is normal       Breath sounds: Wheezing and rhonchi present  Musculoskeletal:      Right lower leg: No edema  Left lower leg: No edema  Neurological:      General: No focal deficit present  Mental Status: She is alert and oriented to person, place, and time  Additional Data:     Lab Results: I have personally reviewed pertinent reports        Results from last 7 days   Lab Units 03/27/21  1355   WBC Thousand/uL 7 08   HEMOGLOBIN g/dL 14 0   HEMATOCRIT % 41 1   PLATELETS Thousands/uL 224   BANDS PCT % 1   LYMPHO PCT % 19   MONO PCT % 2*   EOS PCT % 1     Results from last 7 days   Lab Units 03/27/21  1355   SODIUM mmol/L 134   POTASSIUM mmol/L 2 8*   CHLORIDE mmol/L 92*   CO2 mmol/L 31   BUN mg/dL 6   CREATININE mg/dL 0 76   ANION GAP mmol/L 11   CALCIUM mg/dL 8 2*   ALBUMIN g/dL 3 7   TOTAL BILIRUBIN mg/dL 0 52   ALK PHOS U/L 48 8   ALT U/L 28   AST U/L 57*   GLUCOSE RANDOM mg/dL 151*                       Imaging: I have personally reviewed pertinent reports  CTA ED chest PE study   Final Result by Jimi Garrido MD (03/27 1538)      No pulmonary embolism  Bilateral areas of consolidation most severe in the anterobasal segment of the right lower lobe consistent with multilobar pneumonia  Extensive mediastinal and bilateral hilar reactive lymphadenopathy  Workstation performed: [id number]         XR chest 1 view portable   ED Interpretation by Dustin Cota PA-C (03/27 8799)   Rll pneumonia          EKG, Pathology, and Other Studies Reviewed on Admission:   · EKG: NSR    Allscripts / Epic Records Reviewed: Yes     ** Please Note: This note has been constructed using a voice recognition system   **

## 2021-03-27 NOTE — ASSESSMENT & PLAN NOTE
Patient is constantly suspicious of her surroundings  Patient suspected her landlord had sprayed her for bed bugs  Psychiatric consult, appreciate recommendation  According to Psychiatry, she has delusion however it is stable    Continue home regimen

## 2021-03-27 NOTE — ASSESSMENT & PLAN NOTE
Patient presents with ongoing cough episodes since past few years, worsening shortness of breath  Patient received ceftriaxone and azithromycin in the ED, will continue with the same  Patient is negative for COVID-19, no reported exposure  CT scan findings- Bilateral areas of consolidation most severe in the anterobasal segment of the right lower lobe consistent with multilobar pneumonia  Extensive mediastinal and bilateral hilar reactive lymphadenopathy  American female, concerns for sarcoidosis given hilar lymphadenopathy  Also risks for atypical infection, will test for HIV  Blood cultures obtained in the ED  Patient is afebrile, requiring 1-2 L of oxygen via nasal cannula  Consider consult to Pulmonary Medicine

## 2021-03-27 NOTE — RESPIRATORY THERAPY NOTE
RT Protocol Note  Nely Barber 51 y o  female MRN: [id number]  Unit/Bed#: -01 Encounter: [id number]    Assessment    Active Problems:    Schizoaffective disorder (HCC)    Tobacco abuse    Multifocal pneumonia    Acute hypoxemic respiratory failure (HCC)    Hallucination    Hypokalemia      Home Pulmonary Medications:  None       Past Medical History:   Diagnosis Date    Schizoaffective disorder ([facility] [address] )      Social History     Socioeconomic History    Marital status: Single     Spouse name: Not on file    Number of children: Not on file    Years of education: Not on file    Highest education level: Not on file   Occupational History    Not on file   Social Needs    Financial resource strain: Not on file    Food insecurity     Worry: Not on file     Inability: Not on file    Transportation needs     Medical: Not on file     Non-medical: Not on file   Tobacco Use    Smoking status: Current Every Day Smoker     Packs/day: 3 00    Smokeless tobacco: Never Used    Tobacco comment: Pt states 18 cigarettes per day   Substance and Sexual Activity    Alcohol use: No     Frequency: Never     Binge frequency: Never    Drug use: No    Sexual activity: Not Currently   Lifestyle    Physical activity     Days per week: Not on file     Minutes per session: Not on file    Stress: Not on file   Relationships    Social connections     Talks on phone: Not on file     Gets together: Not on file     Attends Buddhism service: Not on file     Active member of club or organization: Not on file     Attends meetings of clubs or organizations: Not on file     Relationship status: Not on file    Intimate partner violence     Fear of current or ex partner: Not on file     Emotionally abused: Not on file     Physically abused: Not on file     Forced sexual activity: Not on file   Other Topics Concern    Not on file   Social History Narrative    Not on file       Subjective         Objective    Physical Exam:   Assessment Type: Pre-treatment  General Appearance: Alert, Awake  Respiratory Pattern: Normal  Chest Assessment: Chest expansion symmetrical  Bilateral Breath Sounds: Diminished, Rhonchi    Vitals:  Blood pressure 138/68, pulse 98, temperature 98 3 °F (36 8 °C), temperature source Oral, resp  rate 20, weight 81 6 kg (180 lb), SpO2 91 %, not currently breastfeeding  Imaging and other studies: I have personally reviewed pertinent reports  Plan    Respiratory Plan: Mild Distress pathway        Resp Comments: Patient was assessed per Respiratory Protocol  She was admitted due to Acute Hypoxic Resp Failure  Current lung sounds are diminished with scattered rhonchi  She does not take any respiratory medications at home  She is a current smoker with a 27 pack year history  Mild distress pathway to be followed and Duoneb order will be changed per protocol to Xopenex and Atrovent Q6  Respiratory to follow

## 2021-03-27 NOTE — ASSESSMENT & PLAN NOTE
Patient is constantly suspicious of her surroundings  patient suspected her landlord had sprayed her for bed bugs    Will consult psychiatry

## 2021-03-28 VITALS
WEIGHT: 180 LBS | HEIGHT: 63 IN | HEART RATE: 89 BPM | SYSTOLIC BLOOD PRESSURE: 120 MMHG | DIASTOLIC BLOOD PRESSURE: 69 MMHG | OXYGEN SATURATION: 90 % | RESPIRATION RATE: 17 BRPM | BODY MASS INDEX: 31.89 KG/M2 | TEMPERATURE: 98.4 F

## 2021-03-28 PROBLEM — J96.01 ACUTE HYPOXEMIC RESPIRATORY FAILURE (HCC): Status: RESOLVED | Noted: 2021-03-27 | Resolved: 2021-03-28

## 2021-03-28 LAB
ALBUMIN SERPL BCP-MCNC: 3.7 G/DL (ref 3.4–4.8)
ALP SERPL-CCNC: 47 U/L (ref 35–140)
ALT SERPL W P-5'-P-CCNC: 26 U/L (ref 5–54)
ANION GAP SERPL CALCULATED.3IONS-SCNC: 9 MMOL/L (ref 4–13)
AST SERPL W P-5'-P-CCNC: 36 U/L (ref 15–41)
BILIRUB SERPL-MCNC: 0.44 MG/DL (ref 0.3–1.2)
BUN SERPL-MCNC: 3 MG/DL (ref 6–20)
CALCIUM SERPL-MCNC: 8.3 MG/DL (ref 8.4–10.2)
CHLORIDE SERPL-SCNC: 97 MMOL/L (ref 96–108)
CO2 SERPL-SCNC: 31 MMOL/L (ref 22–33)
CREAT SERPL-MCNC: 0.68 MG/DL (ref 0.4–1.1)
CRP SERPL QL: 2.4 MG/L (ref 0–1)
ERYTHROCYTE [DISTWIDTH] IN BLOOD BY AUTOMATED COUNT: 13.9 % (ref 11.6–15.1)
EST. AVERAGE GLUCOSE BLD GHB EST-MCNC: 154 MG/DL
GFR SERPL CREATININE-BSD FRML MDRD: 105 ML/MIN/1.73SQ M
GLUCOSE SERPL-MCNC: 179 MG/DL (ref 65–140)
HBA1C MFR BLD: 7 %
HCT VFR BLD AUTO: 40.1 % (ref 34.8–46.1)
HGB BLD-MCNC: 13.5 G/DL (ref 11.5–15.4)
MCH RBC QN AUTO: 27.6 PG (ref 26.8–34.3)
MCHC RBC AUTO-ENTMCNC: 33.7 G/DL (ref 31.4–37.4)
MCV RBC AUTO: 82 FL (ref 82–98)
PLATELET # BLD AUTO: 255 THOUSANDS/UL (ref 149–390)
PMV BLD AUTO: 10.1 FL (ref 8.9–12.7)
POTASSIUM SERPL-SCNC: 3.2 MMOL/L (ref 3.5–5)
PROT SERPL-MCNC: 7.3 G/DL (ref 6.4–8.3)
RBC # BLD AUTO: 4.89 MILLION/UL (ref 3.81–5.12)
SODIUM SERPL-SCNC: 137 MMOL/L (ref 133–145)
WBC # BLD AUTO: 4.99 THOUSAND/UL (ref 4.31–10.16)

## 2021-03-28 PROCEDURE — 87389 HIV-1 AG W/HIV-1&-2 AB AG IA: CPT | Performed by: INTERNAL MEDICINE

## 2021-03-28 PROCEDURE — 94760 N-INVAS EAR/PLS OXIMETRY 1: CPT

## 2021-03-28 PROCEDURE — 86140 C-REACTIVE PROTEIN: CPT | Performed by: INTERNAL MEDICINE

## 2021-03-28 PROCEDURE — 85027 COMPLETE CBC AUTOMATED: CPT | Performed by: INTERNAL MEDICINE

## 2021-03-28 PROCEDURE — 80053 COMPREHEN METABOLIC PANEL: CPT | Performed by: INTERNAL MEDICINE

## 2021-03-28 PROCEDURE — 83036 HEMOGLOBIN GLYCOSYLATED A1C: CPT | Performed by: INTERNAL MEDICINE

## 2021-03-28 PROCEDURE — 94640 AIRWAY INHALATION TREATMENT: CPT

## 2021-03-28 PROCEDURE — 99239 HOSP IP/OBS DSCHRG MGMT >30: CPT | Performed by: INTERNAL MEDICINE

## 2021-03-28 RX ORDER — PALIPERIDONE 3 MG/1
3 TABLET, EXTENDED RELEASE ORAL DAILY
Status: DISCONTINUED | OUTPATIENT
Start: 2021-03-28 | End: 2021-03-28 | Stop reason: HOSPADM

## 2021-03-28 RX ORDER — CEPHALEXIN 500 MG/1
500 CAPSULE ORAL EVERY 6 HOURS SCHEDULED
Status: DISCONTINUED | OUTPATIENT
Start: 2021-03-28 | End: 2021-03-28 | Stop reason: HOSPADM

## 2021-03-28 RX ORDER — AZITHROMYCIN 250 MG/1
250 TABLET, FILM COATED ORAL EVERY 24 HOURS
Status: DISCONTINUED | OUTPATIENT
Start: 2021-03-28 | End: 2021-03-28 | Stop reason: HOSPADM

## 2021-03-28 RX ORDER — AZITHROMYCIN 250 MG/1
250 TABLET, FILM COATED ORAL EVERY 24 HOURS
Qty: 4 TABLET | Refills: 0 | Status: SHIPPED | OUTPATIENT
Start: 2021-03-28 | End: 2021-03-28 | Stop reason: SDUPTHER

## 2021-03-28 RX ORDER — CEPHALEXIN 500 MG/1
500 CAPSULE ORAL EVERY 6 HOURS SCHEDULED
Qty: 16 CAPSULE | Refills: 0 | Status: SHIPPED | OUTPATIENT
Start: 2021-03-28 | End: 2021-03-29

## 2021-03-28 RX ORDER — AZITHROMYCIN 250 MG/1
250 TABLET, FILM COATED ORAL EVERY 24 HOURS
Qty: 4 TABLET | Refills: 0 | Status: SHIPPED | OUTPATIENT
Start: 2021-03-28 | End: 2021-04-01

## 2021-03-28 RX ORDER — POTASSIUM CHLORIDE 20 MEQ/1
40 TABLET, EXTENDED RELEASE ORAL ONCE
Status: DISCONTINUED | OUTPATIENT
Start: 2021-03-28 | End: 2021-03-28 | Stop reason: HOSPADM

## 2021-03-28 RX ADMIN — ENOXAPARIN SODIUM 40 MG: 40 INJECTION SUBCUTANEOUS at 09:38

## 2021-03-28 RX ADMIN — LEVALBUTEROL HYDROCHLORIDE 1.25 MG: 1.25 SOLUTION, CONCENTRATE RESPIRATORY (INHALATION) at 07:50

## 2021-03-28 RX ADMIN — IPRATROPIUM BROMIDE 0.5 MG: 0.5 SOLUTION RESPIRATORY (INHALATION) at 02:31

## 2021-03-28 RX ADMIN — LEVALBUTEROL HYDROCHLORIDE 1.25 MG: 1.25 SOLUTION, CONCENTRATE RESPIRATORY (INHALATION) at 02:31

## 2021-03-28 RX ADMIN — GUAIFENESIN AND DEXTROMETHORPHAN 10 ML: 100; 10 SYRUP ORAL at 00:57

## 2021-03-28 RX ADMIN — IPRATROPIUM BROMIDE 0.5 MG: 0.5 SOLUTION RESPIRATORY (INHALATION) at 07:50

## 2021-03-28 NOTE — PROGRESS NOTES
Discharge instructions reviewed with patient  Discussed the importance of completing the full course of antibiotics and picking up her medications  Upon discharge patient was calm and cooperative and stated she understood the discharge instructions

## 2021-03-28 NOTE — DISCHARGE INSTR - AVS FIRST PAGE
DISCHARGE INSTRUCTIONS   1  Make an appointment with primary care physician in one week  in regards to recent hospitalization  2  Take medications regularly  New medication:-  Keflex 500 mg every 6 hourly for 4 days  Azithromycin 250 mg daily for 4 days  3  Come back to the ER if symptoms recur or worsen

## 2021-03-28 NOTE — DISCHARGE INSTRUCTIONS
Azithromycin (By mouth)   Azithromycin ([id number]-[id number])  Treats infections  This medicine is a macrolide antibiotic  Brand Name(s): Zithromax, Zithromax Tri-Bryon, Zithromax Z-Bryon, Zmax   There may be other brand names for this medicine  When This Medicine Should Not Be Used: This medicine is not right for everyone  Do not use it if you had an allergic reaction to azithromycin, erythromycin, or similar medicines, or you have a history of liver problems caused by azithromycin  How to Use This Medicine:   Capsule, Liquid, Packet, Powder, Tablet  · Your doctor will tell you how much medicine to use  Do not use more than directed  · Take all of the medicine in your prescription to clear up your infection, even if you feel better after the first few doses  · Read and follow the patient instructions that come with this medicine  Talk to your doctor or pharmacist if you have any questions  · Multiple dose (Zithromax® oral liquid or tablets):   ? You may take this medicine with or without food  ? Shake the bottle well before you measure the medicine  Measure the oral liquid medicine with a marked measuring spoon, oral syringe, or medicine cup  · Single dose (Zmax® extended-release oral liquid or powder):   ? Liquid:   § Take this medicine on an empty stomach at least 1 hour before you eat, or 2 hours after you eat  § Call your doctor right away if you vomit within 1 hour after you take the medicine  § You must take the liquid within 12 hours after the pharmacist gives it to you  § Shake the bottle well before you measure the medicine  Measure your dose with a marked measuring spoon, cup, or syringe  ? Powder:   § Open 1 packet and pour all of the medicine into a glass with about 2 ounces (¼ cup) of water  Mix well and drink the medicine right away  Pour another 2 ounces of water into the same glass, and drink the remaining medicine  · Missed dose:  If you are taking multiple doses, take the dose as soon as you remember  If it is almost time for your next dose, wait until then to take a regular dose  Do not use extra medicine to make up for a missed dose  · Store the medicine in a closed container at room temperature, away from heat, moisture, and direct light  · Extended-release oral liquid: Do not refrigerate or freeze  · Oral liquid for 1 dose only: Store at room temperature  Do not store in the refrigerator or allow the medicine to freeze  · Oral liquid for multiple doses: Store at room temperature or in the refrigerator  Use it within 10 days of filling the prescription  Drugs and Foods to Avoid:   Ask your doctor or pharmacist before using any other medicine, including over-the-counter medicines, vitamins, and herbal products  · Some medicines can affect how azithromycin works  Tell your doctor if you are also using any of the following:  ? Cyclosporine, digoxin, nelfinavir, or phenytoin  ? Blood thinner  ? Ergot medicine  ? Medicine for a heart rhythm problem (including amiodarone, dofetilide, procainamide, quinidine, sotalol)  · Zithromax® for multiple doses: Do not take an antacid that contains magnesium or aluminum at the same time you take Zithromax®  An antacid will affect how the medicine works  Antacids will not affect Zmax® for single dose  Warnings While Using This Medicine:   · Tell your doctor if you are pregnant or breastfeeding, or if you have kidney disease, liver disease, heart disease, heart rhythm problems, heart failure, or myasthenia gravis  Tell your doctor if anyone in your family has heart rhythm problems  · This medicine may cause the following problems:   ? Serious skin reactions  ? Liver problems  ? Infantile hypertrophic pyloric stenosis  ? Heart rhythm problems  · This medicine can cause diarrhea  Call your doctor if the diarrhea becomes severe, does not stop, or is bloody  Do not take any medicine to stop diarrhea until you have talked to your doctor   Diarrhea can occur 2 months or more after you stop taking this medicine  It may occur 2 months or more after you stop using this medicine  · Call your doctor if your symptoms do not improve or if they get worse  · Keep all medicine out of the reach of children  Never share your medicine with anyone  Possible Side Effects While Using This Medicine:   Call your doctor right away if you notice any of these side effects:  · Allergic reaction: Itching or hives, swelling in your face or hands, swelling or tingling in your mouth or throat, chest tightness, trouble breathing  · Blistering, peeling, red skin rash  · Dark urine, pale stools, nausea, vomiting, loss of appetite, stomach pain, yellow skin or eyes  · Double vision, tiredness or weakness  · Fainting, dizziness, lightheadedness  · Fast, pounding, or uneven heartbeat, chest pain  · Feeling irritable or vomits after feeding (in babies)  · Severe diarrhea that may contain blood, stomach cramps, fever  If you notice these less serious side effects, talk with your doctor:   · Mild diarrhea, nausea, vomiting, stomach pain  If you notice other side effects that you think are caused by this medicine, tell your doctor  Call your doctor for medical advice about side effects  You may report side effects to FDA at 2-038-FDA-5924  © Copyright [facility] [address] Information is for End User's use only and may not be sold, redistributed or otherwise used for commercial purposes  The above information is an  only  It is not intended as medical advice for individual conditions or treatments  Talk to your doctor, nurse or pharmacist before following any medical regimen to see if it is safe and effective for you  Cephalexin (By mouth)   Cephalexin (ajl-p-GMJ-in)  Treats infections  This medicine is a cephalosporin antibiotic  Brand Name(s): Keflex   There may be other brand names for this medicine  When This Medicine Should Not Be Used:    This medicine is not right for everyone  Do not use this medicine if you had an allergic reaction to cephalexin or another cephalosporin medicine  How to Use This Medicine:   Capsule, Tablet, Tablet for Suspension, Liquid  · Your doctor will tell you how much medicine to use  Do not use more than directed  · Read and follow the patient instructions that come with this medicine  Talk to your doctor or pharmacist if you have any questions  · You may take your medicine with food or milk to avoid stomach upset  · Oral liquid: Shake well just before use  Measure the oral liquid medicine with a marked measuring spoon, oral syringe, or medicine cup  · Take all of the medicine in your prescription to clear up your infection, even if you feel better after the first few doses  · Missed dose: Take a dose as soon as you remember  If it is almost time for your next dose, wait until then and take a regular dose  Do not take extra medicine to make up for a missed dose  · Capsule or tablet: Store at room temperature away from heat, moisture, and direct light  · Oral liquid: Store in the refrigerator for 14 days  After 14 days, throw away any unused medicine  Do not freeze  Drugs and Foods to Avoid:   Ask your doctor or pharmacist before using any other medicine, including over-the-counter medicines, vitamins, and herbal products  · Some medicines and foods can affect how cephalexin works  Tell your doctor if you are also using  ? Metformin  ? Probenecid  Warnings While Using This Medicine:   · Tell your doctor if you are pregnant or breastfeeding, or if you have kidney disease, liver disease, or a history of digestive problems, such as colitis  Tell your doctor if you are allergic to penicillin  · This medicine can cause diarrhea  Call your doctor if the diarrhea becomes severe, does not stop, or is bloody  Do not take any medicine to stop diarrhea until you have talked to your doctor   Diarrhea can occur 2 months or more after you stop taking this medicine  · Tell any doctor or dentist who treats you that you are using this medicine  This medicine may affect certain medical test results  · Call your doctor if your symptoms do not improve or if they get worse  · Keep all medicine out of the reach of children  Never share your medicine with anyone  Possible Side Effects While Using This Medicine:   Call your doctor right away if you notice any of these side effects:  · Allergic reaction: Itching or hives, swelling in your face or hands, swelling or tingling in your mouth or throat, chest tightness, trouble breathing  · Blistering, peeling, red skin rash  · Severe diarrhea, especially if bloody or ongoing  · Severe stomach pain, vomiting  If you notice these less serious side effects, talk with your doctor:   · Mild diarrhea or nausea  If you notice other side effects that you think are caused by this medicine, tell your doctor  Call your doctor for medical advice about side effects  You may report side effects to FDA at 2-606-FDA-8171  © Copyright [address] Information is for End User's use only and may not be sold, redistributed or otherwise used for commercial purposes  The above information is an  only  It is not intended as medical advice for individual conditions or treatments  Talk to your doctor, nurse or pharmacist before following any medical regimen to see if it is safe and effective for you

## 2021-03-28 NOTE — QUICK NOTE
Called the patient's phone number [phone] to update her HbA1C result  Unable to reach the patient  So, called patient's mother Kadi [facility] [phone]  Mother said she was very unhappy regarding the patient's care  She said 'you are not giving antibiotics for the patient who came in with pneumonia and consult psych which is not relevant to patient's symptoms  You scare my daughter who is a psych patient'  I explained her that we are giving antibiotics and we offered her to stay at the hospital however she left  Explained that we consult psych due to her delusion  Spoke to mother about HbA1C result and to make an appointment with PCP  Mother reports the patient does not have PCP  Reports that we instructed to make an appointment with PCP in discharge paper

## 2021-03-28 NOTE — CONSULTS
Consultation - Sheryl Quezada 51 y o  female MRN: [id number]    Unit/Bed#: -01 Encounter: [id number]      Identifying Data:  51years old black female is admitted at [facility] on March 27, 2021 with complaining of headache cough and shortness of breath  Psychiatric consultation is asked for schizoaffective disorder  Patient examined spoke with the nurse history physical medications labs reviewed and noted  Patient is a poor historian but she reports that she is getting Invega injection at local pharmacy every month and she has a female psychiatrist in [place] but since pandemic she talks to them on the phone and get the therapy  She is able to give out the long psych history including multiple psychiatric admission and at least 1 psychiatric admission at [facility] in the past and 1 suicide attempt in 1998 she reported that she tried to blue off her car at the gas station and she was taken to the hospital   Currently patient is under psychiatric care and taking her psychotropic medication even though she is chronically psychotic and delusional but she is stable at her baseline she is not acting out she denied any psychotic symptoms like hallucination or suicidal ideation and she is asking me if she does not have a pneumonia she wants to go home she is not a danger to herself or others at this time and she does not meet the criteria for inpatient psychiatric care once she is medically cleared she can be discharged home and she will follow-up with her psychiatrist and therapist   Drug and alcohol level was not done but previous drug screen noted and they were negative  Patient also denies abusing drugs or alcohol she denies history of drug and alcohol abuse  Currently patient is getting Invega injection 234 mg IM every month and Invega 3 mg HS    Patient reports that her last Invega injection was on March 18th 2021 so she is not due for her long-acting injection of psychotropic medication  Social history patient is single she lives alone she has no boyfriend she has no children she does not have a car patient smokes cigarette she denies abusing alcohol or drugs she denies history of drug and alcohol abuse no history of IV drug abuse she has a college education in social G and Psychology she was working in [employer] but she has been disabled since 2011  Diagnosis schizoaffective disorder anxiety  Patient denies other medical or surgical problems    Allergy Risperdal Haldol Bactrim and zinc        Chief Complaints:  Schizoaffective disorder    Family History: ''I do not know''  Family History   Family history unknown: Yes       Legal History:  Patient could not answer that properly she reported that police took her to the [facility] and then they took her straight from there to the [facility] and she was admitted there  Mental Status Exam:  51years old black female is alert awake oriented to place and person she is able to tell me her age date of birth current year current month she knows that she is at the hospital she reports that she did not feel good so Harjinder Wickjeannineshelby her mother figure drove her to the hospital and she is admitted  Poor sleep appetite okay currently patient denies auditory or visual hallucinations  Patient denies suicidal or homicidal ideation ideations  Patient is chronically psychotic paranoid and delusional and she feels that she has COVID-19 and pneumonia etcetera but then she wants to go home  Patient is not agitated patient is not lethargic she was able to answer my questions even though she is a poor historian and she has poor insight into her illness her judgment is also limited due to chronic psychosis but she is not agitated she denies suicidal or homicidal ideation she is not lethargic and she is complying with her medications at home        History of Present Illness     HPI: Mykel Otero is a 51y o  year old female who presents with headache coughing and shortness of breath    Consults      Historical Information   Past Psychiatric History:  Patient gives an extensive psych history with multiple psychiatric admissions including [facility] in the past and at least 1 suicidal gesture in [address] otherwise she has been chronically psychotic and she is under psychiatric care currently patient is on psychotropic medications with her psychiatrist and she does have a therapist at the local clinic in [facility] and she is on psychiatric medications as described above patient denies history of drug and alcohol abuse no history of IV drug abuse and legal history he is not clear as described above  Past Medical History:   Diagnosis Date    Schizoaffective disorder ([address] )      No past surgical history on file    Social History   Social History     Substance and Sexual Activity   Alcohol Use Never    Frequency: Never    Binge frequency: Never     Social History     Substance and Sexual Activity   Drug Use No     Social History     Tobacco Use   Smoking Status Current Every Day Smoker    Packs/day: 3 00   Smokeless Tobacco Never Used   Tobacco Comment    Pt states 18 cigarettes per day       Meds/Allergies   current meds:   Current Facility-Administered Medications   Medication Dose Route Frequency    azithromycin (ZITHROMAX) 500 mg in sodium chloride 0 9% 250mL IVPB 500 mg  500 mg Intravenous Q24H    cefTRIAXone (ROCEPHIN) IVPB (premix in dextrose) 1,000 mg 50 mL  1,000 mg Intravenous Q24H    dextromethorphan-guaiFENesin (ROBITUSSIN DM) oral syrup 10 mL  10 mL Oral Q4H PRN    enoxaparin (LOVENOX) subcutaneous injection 40 mg  40 mg Subcutaneous Daily    ipratropium (ATROVENT) 0 02 % inhalation solution 0 5 mg  0 5 mg Nebulization Q6H    levalbuterol (XOPENEX) inhalation solution 1 25 mg  1 25 mg Nebulization Q6H    melatonin tablet 3 mg  3 mg Oral HS    nicotine (NICODERM CQ) 14 mg/24hr TD 24 hr patch 1 patch  1 patch Transdermal Daily    and PTA meds:    Medications Prior to Admission   Medication    INVEGA SUSTENNA 156 MG/ML IM injection    carbamide peroxide (DEBROX) 6 5 % otic solution    fluticasone (FLONASE) 50 mcg/act nasal spray    paliperidone (INVEGA) 3 mg 24 hr tablet    paliperidone palmitate ER (INVEGA) 234 mg/1 5 mL IM injection    sodium chloride (OCEAN) 0 65 % nasal spray     Allergies   Allergen Reactions    Risperidone Shortness Of Breath     Previously tolerated [place] and Invega tablets without a problem   Bacitracin     Haloperidol Other (See Comments)     Tardive Dyskinesia      Zinc        Objective   Vitals: Blood pressure 120/69, pulse 89, temperature 98 4 °F (36 9 °C), temperature source Tympanic, resp  rate 17, height 5' 3" (1 6 m), weight 81 6 kg (180 lb), SpO2 90 %, not currently breastfeeding        Routine Lab Results:   Admission on 03/27/2021   Component Date Value Ref Range Status    Color, UA 03/27/2021 Yellow  Yellow Final    Clarity, UA 03/27/2021 Clear  Clear Final    Specific Gravity, UA 03/27/2021 <=1 005  1 001 - 1 030 Final    pH, UA 03/27/2021 5 5  5 0, 5 5, 6 0, 6 5, 7 0, 7 5, 8 0 Final    Leukocytes, UA 03/27/2021 Negative  Negative Final    Nitrite, UA 03/27/2021 Negative  Negative Final    Protein, UA 03/27/2021 Negative  Negative, Interference- unable to analyze mg/dl Final    Glucose, UA 03/27/2021 Negative  Negative mg/dl Final    Ketones, UA 03/27/2021 Negative  Negative mg/dl Final    Urobilinogen, UA 03/27/2021 0 2  0 2, 1 0 E U /dl E U /dl Final    Bilirubin, UA 03/27/2021 Negative  Negative Final    Blood, UA 03/27/2021 Trace-Intact* Negative Final    WBC 03/27/2021 7 08  4 31 - 10 16 Thousand/uL Final    RBC 03/27/2021 5 11  3 81 - 5 12 Million/uL Final    Hemoglobin 03/27/2021 14 0  11 5 - 15 4 g/dL Final    Hematocrit 03/27/2021 41 1  34 8 - 46 1 % Final    MCV 03/27/2021 80* 82 - 98 fL Final    [address] 03/27/2021 27 4  26 8 - 34 3 pg Final    MCHC 03/27/2021 34 1  31 4 - 37 4 g/dL Final    RDW 03/27/2021 13 7  11 6 - 15 1 % Final    MPV 03/27/2021 10 0  8 9 - 12 7 fL Final    Platelets 33/53/2671 224  149 - 390 Thousands/uL Final    Sodium 03/27/2021 134  133 - 145 mmol/L Final    Potassium 03/27/2021 2 8* 3 5 - 5 0 mmol/L Final    Chloride 03/27/2021 92* 96 - 108 mmol/L Final    CO2 03/27/2021 31  22 - 33 mmol/L Final    ANION GAP 03/27/2021 11  4 - 13 mmol/L Final    BUN 03/27/2021 6  6 - 20 mg/dL Final    Creatinine 03/27/2021 0 76  0 40 - 1 10 mg/dL Final    Standardized to IDMS reference method    Glucose 03/27/2021 151* 65 - 140 mg/dL Final    If the patient is fasting, the ADA then defines impaired fasting glucose as > 100 mg/dL and diabetes as > or equal to 123 mg/dL  Specimen collection should occur prior to Sulfasalazine administration due to the potential for falsely depressed results  Specimen collection should occur prior to Sulfapyridine administration due to the potential for falsely elevated results   Calcium 03/27/2021 8 2* 8 4 - 10 2 mg/dL Final    AST 03/27/2021 57* 15 - 41 U/L Final    Specimen collection should occur prior to Sulfasalazine administration due to the potential for falsely depressed results   ALT 03/27/2021 28  5 - 54 U/L Final    Specimen collection should occur prior to Sulfasalazine administration due to the potential for falsely depressed results       Alkaline Phosphatase 03/27/2021 48 8  35 - 140 U/L Final    Total Protein 03/27/2021 7 1  6 4 - 8 3 g/dL Final    Albumin 03/27/2021 3 7  3 4 - 4 8 g/dL Final    Total Bilirubin 03/27/2021 0 52  0 30 - 1 20 mg/dL Final    eGFR 03/27/2021 94  ml/min/1 73sq m Final    SARS-CoV-2 03/27/2021 Negative  Negative Final    INFLUENZA A PCR 03/27/2021 Negative  Negative Final    INFLUENZA B PCR 03/27/2021 Negative  Negative Final    RSV PCR 03/27/2021 Negative  Negative Final    Blood Culture 03/27/2021 Received in Microbiology Lab  Culture in Progress  Preliminary    Blood Culture 03/27/2021 Received in Microbiology Lab  Culture in Progress  Preliminary    Segmented % 03/27/2021 77* 43 - 75 % Final    Bands % 03/27/2021 1  0 - 8 % Final    Lymphocytes % 03/27/2021 19  14 - 44 % Final    Monocytes % 03/27/2021 2* 4 - 12 % Final    Eosinophils, % 03/27/2021 1  0 - 6 % Final    Basophils % 03/27/2021 0  0 - 1 % Final    Absolute Neutrophils 03/27/2021 5 52  1 85 - 7 62 Thousand/uL Final    Lymphocytes Absolute 03/27/2021 1 35  0 60 - 4 47 Thousand/uL Final    Monocytes Absolute 03/27/2021 0 14  0 00 - 1 22 Thousand/uL Final    Eosinophils Absolute 03/27/2021 0 07  0 00 - 0 40 Thousand/uL Final    Basophils Absolute 03/27/2021 0 00  0 00 - 0 10 Thousand/uL Final    Total Counted 03/27/2021 100   Final    RBC Morphology 03/27/2021 Normal   Final    Platelet Estimate 51/76/6250 Adequate  Adequate Final    Sed Rate 03/27/2021 17  0 - 20 mm/hour Final    RBC, UA 03/27/2021 None Seen  None Seen, 0-1, 1-2, 2-4, 0-5 /hpf Final    WBC, UA 03/27/2021 0-1  None Seen, 0-1, 1-2, 0-5, 2-4 /hpf Final    Epithelial Cells 03/27/2021 Occasional  None Seen, Occasional /hpf Final    Bacteria, UA 03/27/2021 None Seen  None Seen, Occasional /hpf Final    Sodium 03/28/2021 137  133 - 145 mmol/L Final    Potassium 03/28/2021 3 2* 3 5 - 5 0 mmol/L Final    Chloride 03/28/2021 97  96 - 108 mmol/L Final    CO2 03/28/2021 31  22 - 33 mmol/L Final    ANION GAP 03/28/2021 9  4 - 13 mmol/L Final    BUN 03/28/2021 3* 6 - 20 mg/dL Final    Creatinine 03/28/2021 0 68  0 40 - 1 10 mg/dL Final    Standardized to IDMS reference method    Glucose 03/28/2021 179* 65 - 140 mg/dL Final    If the patient is fasting, the ADA then defines impaired fasting glucose as > 100 mg/dL and diabetes as > or equal to 123 mg/dL  Specimen collection should occur prior to Sulfasalazine administration due to the potential for falsely depressed results  Specimen collection should occur prior to Sulfapyridine administration due to the potential for falsely elevated results   Calcium 03/28/2021 8 3* 8 4 - 10 2 mg/dL Final    AST 03/28/2021 36  15 - 41 U/L Final    Specimen collection should occur prior to Sulfasalazine administration due to the potential for falsely depressed results   ALT 03/28/2021 26  5 - 54 U/L Final    Specimen collection should occur prior to Sulfasalazine administration due to the potential for falsely depressed results   Alkaline Phosphatase 03/28/2021 47 0  35 - 140 U/L Final    Total Protein 03/28/2021 7 3  6 4 - 8 3 g/dL Final    Albumin 03/28/2021 3 7  3 4 - 4 8 g/dL Final    Total Bilirubin 03/28/2021 0 44  0 30 - 1 20 mg/dL Final    eGFR 03/28/2021 105  ml/min/1 73sq m Final    WBC 03/28/2021 4 99  4 31 - 10 16 Thousand/uL Final    RBC 03/28/2021 4 89  3 81 - 5 12 Million/uL Final    Hemoglobin 03/28/2021 13 5  11 5 - 15 4 g/dL Final    Hematocrit 03/28/2021 40 1  34 8 - 46 1 % Final    MCV 03/28/2021 82  82 - 98 fL Final    MCH 03/28/2021 27 6  26 8 - 34 3 pg Final    MCHC 03/28/2021 33 7  31 4 - 37 4 g/dL Final    RDW 03/28/2021 13 9  11 6 - 15 1 % Final    MPV 03/28/2021 10 1  8 9 - 12 7 fL Final    Platelets 69/80/5028 255  149 - 390 Thousands/uL Final    CRP 03/28/2021 2 4* 0 0 - 1 0 mg/L Final         Diagnosis:  Schizoaffective disorder  Anxiety    Plan:  Invega 3 mg HS  No need for inpatient psychiatric care  Discussed with the nurse medical attending and resident physician  Patient will follow-up with her psychiatrist and therapist after the discharge  I will follow up during the hospital stay      Enrrique Starr MD

## 2021-03-28 NOTE — NURSING NOTE
I came to her room for scheduled medication @ 12 noon then started telling me that I can just leave the medications on her table  She started saying that im accusing her of something which I did not say anything

## 2021-03-28 NOTE — PROGRESS NOTES
Was asked by Brett Newton RN to see this patient  Patient states she does not trust Dr Delilah Santana "he could be a  or something" I dont want him to come back in here and I want the doctors to discharge me to home  Spoke with Dr Roscoe Skinner and he will discharge home

## 2021-03-28 NOTE — QUICK NOTE
Overnight patient requesting medication ivermectin, she believes it kills all parasites including COVID-19  Patient was earlier explained that she does not have COVID-19    Pending psych consult

## 2021-03-28 NOTE — UTILIZATION REVIEW
Initial Clinical Review    Admission: Date/Time/Statement:   Admission Orders (From admission, onward)     Ordered        03/27/21 1554  Inpatient Admission  Once                   Orders Placed This Encounter   Procedures    Inpatient Admission     Standing Status:   Standing     Number of Occurrences:   1     Order Specific Question:   Level of Care     Answer:   Med Surg [16]     Order Specific Question:   Estimated length of stay     Answer:   More than 2 Midnights     Order Specific Question:   Certification     Answer:   I certify that inpatient services are medically necessary for this patient for a duration of greater than two midnights  See H&P and MD Progress Notes for additional information about the patient's course of treatment  ED Arrival Information     Expected Arrival Acuity Means of Arrival Escorted By Service Admission Type    - 3/27/2021 13:17 Emergent Walk-In Self General Medicine Emergency    Arrival Complaint    abd pain         Chief Complaint   Patient presents with    Medical Problem     Pt states "I think I have been poisened" the land lord sprayed for bed bugs on the 21st, ever since she has been sob off and on     Assessment/Plan:   51 yof to ER from home c/o c/o facial/sinus pain/pressure, headache, cough, sob; concerned about reaction to bed bug spray from 3/21  Hx Schizoaffective disorder  Presents tachycardic, lungs with wheezing & rhonchi, nervous/anxious, hallucinating/suspicious, other s/s as above  Admission work-up showing hypokalemia, multilobar pneumonia on imaging  Admitted to inpatient status for multifocal pneumonia, concerns for sarcoidosis given hilar lymphadenopathy  Also risks for atypical infection, will test for HIV  Started on IVABT, psyche consulted, blood cultures pending  3/28:  Overnight patient requesting medication ivermectin, she believes it kills all parasites including COVID-19  Patient was earlier explained that she does not have COVID-19  Pending psych consult  Per psyche:  Mental Status Exam:  51years old black female is alert awake oriented to place and person she is able to tell me her age date of birth current year current month she knows that she is at the hospital she reports that she did not feel good so Yelena Moy her mother figure drove her to the hospital and she is admitted  Poor sleep appetite okay currently patient denies auditory or visual hallucinations  Patient denies suicidal or homicidal ideation ideations  Patient is chronically psychotic paranoid and delusional and she feels that she has COVID-19 and pneumonia etcetera but then she wants to go home  Patient is not agitated patient is not lethargic she was able to answer my questions even though she is a poor historian and she has poor insight into her illness her judgment is also limited due to chronic psychosis but she is not agitated she denies suicidal or homicidal ideation she is not lethargic and she is complying with her medications at home    ED Triage Vitals   Temperature Pulse Respirations Blood Pressure SpO2   03/27/21 1326 03/27/21 1326 03/27/21 1326 03/27/21 1332 03/27/21 1326   98 3 °F (36 8 °C) (!) 110 21 149/60 92 %      Temp Source Heart Rate Source Patient Position - Orthostatic VS BP Location FiO2 (%)   03/27/21 1326 03/27/21 1326 03/27/21 1326 03/27/21 1326 --   Oral Monitor Lying Right arm       Pain Score       03/27/21 1700       4          Wt Readings from Last 1 Encounters:   03/27/21 81 6 kg (180 lb)     Additional Vital Signs:   Date/Time  Temp  Pulse  Resp  BP  MAP (mmHg)  SpO2  O2 Device  Patient Position - Orthostatic VS   03/28/21 0750  --  --  --  --  --  90 %  None (Room air)  --   03/28/21 0709  98 4 °F (36 9 °C)  89  17  120/69  --  98 %  None (Room air)  Lying   03/28/21 0233  --  --  --  --  --  94 %  None (Room air)  --   03/27/21 2300  97 6 °F (36 4 °C)  88  18  112/69  84  92 %  None (Room air)  Lying   03/27/21 2113  --  --  --  --  --  95 %  None (Room air)  --   03/27/21 1930  --  --  --  --  --  --  None (Room air)  --     Pertinent Labs/Diagnostic Test Results:   Results from last 7 days   Lab Units 03/27/21  1355   SARS-COV-2  Negative     Results from last 7 days   Lab Units 03/27/21  1355   WBC Thousand/uL 7 08   HEMOGLOBIN g/dL 14 0   HEMATOCRIT % 41 1   PLATELETS Thousands/uL 224   BANDS PCT % 1     Results from last 7 days   Lab Units 03/27/21  1355   SODIUM mmol/L 134   POTASSIUM mmol/L 2 8*   CHLORIDE mmol/L 92*   CO2 mmol/L 31   ANION GAP mmol/L 11   BUN mg/dL 6   CREATININE mg/dL 0 76   EGFR ml/min/1 73sq m 94   CALCIUM mg/dL 8 2*     Results from last 7 days   Lab Units 03/27/21  1355   AST U/L 57*   ALT U/L 28   ALK PHOS U/L 48 8   TOTAL PROTEIN g/dL 7 1   ALBUMIN g/dL 3 7   TOTAL BILIRUBIN mg/dL 0 52     Results from last 7 days   Lab Units 03/27/21  1355   GLUCOSE RANDOM mg/dL 151*     Results from last 7 days   Lab Units 03/27/21  1355   SED RATE mm/hour 17     Results from last 7 days   Lab Units 03/27/21  2109   CLARITY UA  Clear   COLOR UA  Yellow   SPEC GRAV UA  <=1 005   PH UA  5 5   GLUCOSE UA mg/dl Negative   KETONES UA mg/dl Negative   BLOOD UA  Trace-Intact*   PROTEIN UA mg/dl Negative   NITRITE UA  Negative   BILIRUBIN UA  Negative   UROBILINOGEN UA E U /dl 0 2   LEUKOCYTES UA  Negative   WBC UA /hpf 0-1   RBC UA /hpf None Seen   BACTERIA UA /hpf None Seen   EPITHELIAL CELLS WET PREP /hpf Occasional     Results from last 7 days   Lab Units 03/27/21  1355   INFLUENZA A PCR  Negative   INFLUENZA B PCR  Negative   RSV PCR  Negative     Results from last 7 days   Lab Units 03/27/21  1445   BLOOD CULTURE  Received in Microbiology Lab  Culture in Progress  Received in Microbiology Lab  Culture in Progress  Results from last 7 days   Lab Units 03/27/21  1355   TOTAL COUNTED  100     3/27  cta chest pe study=  No pulmonary embolism    Bilateral areas of consolidation most severe in the anterobasal segment of the right lower lobe consistent with multilobar pneumonia  Extensive mediastinal and bilateral hilar reactive lymphadenopathy  ED Treatment:   Medication Administration from 03/27/2021 1317 to 03/27/2021 1619       Date/Time Order Dose Route Action     03/27/2021 1403 sodium chloride 0 9 % bolus 1,000 mL 1,000 mL Intravenous New Bag     03/27/2021 1449 cefTRIAXone (ROCEPHIN) IVPB (premix in dextrose) 1,000 mg 50 mL 1,000 mg Intravenous New Bag     03/27/2021 1509 azithromycin (ZITHROMAX) 500 mg in sodium chloride 0 9% 250mL IVPB 500 mg 500 mg Intravenous New Bag     03/27/2021 1452 potassium chloride (K-DUR,KLOR-CON) CR tablet 40 mEq 40 mEq Oral Given     03/27/2021 1533 iohexol (OMNIPAQUE) 350 MG/ML injection (SINGLE-DOSE) 85 mL 85 mL Intravenous Given        Past Medical History:   Diagnosis Date    Schizoaffective disorder ([facility] )      Present on Admission:   Tobacco abuse   Schizoaffective disorder ([facility] )    Admitting Diagnosis: Hypokalemia [E87 6]  Pneumonia [J18 9]  SOB (shortness of breath) [R06 02]  Hypoxia [R09 02]  Age/Sex: 51 y o  female  Admission Orders:  Contact & airborne isolation  Pt/ot eval & tx  Scd/foot pumps  Consult psyche    Scheduled Medications:  azithromycin, 500 mg, Intravenous, Q24H  cefTRIAXone, 1,000 mg, Intravenous, Q24H  enoxaparin, 40 mg, Subcutaneous, Daily  ipratropium, 0 5 mg, Nebulization, Q6H  levalbuterol, 1 25 mg, Nebulization, Q6H  melatonin, 3 mg, Oral, HS  nicotine, 1 patch, Transdermal, Daily    PRN Meds:  dextromethorphan-guaiFENesin, 10 mL, Oral, Q4H PRN    Network Utilization Review Department  ATTENTION: Please call with any questions or concerns to [phone] and carefully listen to the prompts so that you are directed to the right person  All voicemails are confidential   Maritza Rodriguez all requests for admission clinical reviews, approved or denied determinations and any other requests to dedicated fax number below belonging to the campus where the patient is receiving treatment  List of dedicated fax numbers for the Facilities:  [address] [place] [address] DENIALS (Administrative/Medical Necessity) [phone]   [address] (Maternity/NICU/Pediatrics) [phone] [address] [facility] [address] Cristian Avila [address] (  [address]li[address] "[address]) [address] [place] [address] Megan Velazquez [address] [place] [id number] [phone]

## 2021-03-28 NOTE — DISCHARGE SUMMARY
[facility]  Discharge- Paula Pillai 1975, 51 y o  female MRN: [id number]  Unit/Bed#: -Jose Encounter: [id number]  Primary Care Provider: No primary care provider on file  Date and time admitted to hospital: 3/27/2021  1:18 PM    Multifocal pneumonia  Assessment & Plan  Patient presents with ongoing cough episodes since past few years, worsening shortness of breath  Patient received ceftriaxone and azithromycin in the ED, will continue with the same  Patient is negative for COVID-19, no reported exposure  CT scan findings- Bilateral areas of consolidation most severe in the anterobasal segment of the right lower lobe consistent with multilobar pneumonia  Extensive mediastinal and bilateral hilar reactive lymphadenopathy  American female, concerns for sarcoidosis given hilar lymphadenopathy  Also risks for atypical infection, will test for HIV  Blood cultures obtained in the ED  Pending report  Patient is afebrile, requiring 1-2 L of oxygen via nasal cannula for a brief episode  Currently she is saturating well on room air  Patient reports subjectively she is improved  Patient is eager to go home  She is very suspicious for surroundings and medical staffs  She reports she will get antibiotics and go home  Explained her medical condition and offer to stay one more night for IV antibiotics however she declined  Discharge home with azithromycin and Keflex  Recommended to follow-up with primary care physician as an outpatient  She reports she does not have primary care physician and she would like to see only [facility] physician who born in [facility]  Hypokalemia  Assessment & Plan  Potassium of 2 8 on admission  Replaced with 40+ 40 meq of potassium  Underlying etiology could be diarrhea  Repeat potassium 3 2, replaced with 40 mEq  Hallucination  Assessment & Plan  Patient is constantly suspicious of her surroundings    Patient suspected her landlord had sprayed her for bed bugs  Psychiatric consult, appreciate recommendation  According to Psychiatry, she has delusion however it is stable  Continue home regimen      Tobacco abuse  Assessment & Plan  Patient reports smoking almost 1 pack since she was 20years old  Order nicotine patches  Smoking cessation counseling    Schizoaffective disorder [facility])  Assessment & Plan  Patient has history of schizoaffective disorder, patient is on Invega injections  Patient gets this injection every 30 days, last injection on 18th March as per as patient  Psychiatric consult, appreciate recommendations    * Acute hypoxemic respiratory failure (HCC)-resolved as of 3/28/2021  Assessment & Plan  Patient requiring 1-2 L of oxygen for a brief episode  Suspect secondary to underlying infiltrates-infectious versus autoimmune  On antibiotics  She is currently saturating well on room air  Acute hypoxic respiratory failure resolved    Discharging Physician / Practitioner: Arpita Burns MD  PCP: No primary care provider on file  Admission Date:   Admission Orders (From admission, onward)     Ordered        03/27/21 1554  Inpatient Admission  Once                   Discharge Date: 03/28/21    Resolved Problems  Date Reviewed: 3/28/2021          Resolved    * (Principal) Acute hypoxemic respiratory failure ([facility] [address] ) 3/28/2021     Resolved by  Arpita Burns MD    Diarrhea 3/27/2021     Resolved by  Emily Montalvo MD          Consultations During Hospital Stay:  · Psychiatry    Procedures Performed:   · None    Significant Findings / Test Results:   CTA ED chest PE study   Final Result by Ayesha Robin MD (03/27 3318)      No pulmonary embolism  Bilateral areas of consolidation most severe in the anterobasal segment of the right lower lobe consistent with multilobar pneumonia  Extensive mediastinal and bilateral hilar reactive lymphadenopathy              Workstation performed: [id number]         XR chest 1 view portable   ED Interpretation by Sujata Jackson PA-C (03/27 1429)   Rll pneumonia      Final Result by Farida Gary MD (03/28 2004)      Bilateral lower lung zone airspace consolidation consistent with pneumonia  Workstation performed: [id number]         ·     Incidental Findings:   · As above    Test Results Pending at Discharge (will require follow up): · HbA1c  · Urine Strep pneumoniae, Legionella  · HIV 1/2 antigen/antibody  · Blood culture     Outpatient Tests Requested:  · None    Complications:  None    Reason for Admission:  Multilobar pneumonia    Hospital Course:     Yumiko Stewart is a 51 y o  female patient with past medical history of schizoaffective disorder who originally presented to the hospital on 3/27/2021 due to cough, headache, shortness of breath  Patient reports that she is been having this cough since past few years, cough is sometimes productive and sometimes nonproductive in nature  Currently patient reports cough is nonproductive  Patient also reports that she is getting more short of breath, patient is not able to quantify with what she gets short of breath  Patient does not report any contact to any sick individual  Overall patient is a very poor historian given schizoaffective disorder  Although patient reports she got her 4 weekly shot on March 18th but she does not follow with her psychiatrist anymore  Vital signs are stable, saturating 92% on room air  Labs on admission-no evidence of leukocytosis, patient is negative for COVID-19  Potassium down to 2 8, AST -57  Chest x-ray and CTA showed bilateral areas of consolidation consistent with multilobar pneumonia  She was given ceftriaxone and azithromycin admitted for further management of multifocal pneumonia  Patient was very suspicious for surrounding and medical staffs  She was evaluated by Psychiatry  She has chronic delusional disorder however it is stable as per Psychiatry    Recommended to follow-up with her psychiatrist and therapist after discharge  She was eager to go home this morning  She was explained about her medical condition and recommended to stay overnight  However she refused and she reports she would like to go home with oral antibiotics  She was given Keflex and azithromycin for 4 more days and recommended to follow-up with primary care physician  She reports she does not have a primary care physician and she would like to see only [facility] physician who born in [facility]  Please see above list of diagnoses and related plan for additional information  Condition at Discharge: good     Discharge Day Visit / Exam:     Subjective:  She was seen and examined at the bedside  She reports she is still have some congestion in her chest however her breathing is improved  No acute event overnight  No fever, chills, nausea, vomiting  No GI or  complaint  Vitals: Blood Pressure: 120/69 (03/28/21 0709)  Pulse: 89 (03/28/21 0709)  Temperature: 98 4 °F (36 9 °C) (03/28/21 0709)  Temp Source: Tympanic (03/28/21 0709)  Respirations: 17 (03/28/21 0709)  Height: 5' 3" (160 cm) (03/27/21 2113)  Weight - Scale: 81 6 kg (180 lb) (03/27/21 2113)  SpO2: 90 % (03/28/21 0750)  Exam:   Physical Exam  Vitals signs and nursing note reviewed  Constitutional:       General: She is not in acute distress  Appearance: She is well-developed  She is obese  She is not ill-appearing  HENT:      Head: Normocephalic and atraumatic  Eyes:      Conjunctiva/sclera: Conjunctivae normal    Neck:      Musculoskeletal: Neck supple  Cardiovascular:      Rate and Rhythm: Normal rate and regular rhythm  Pulses: Normal pulses  Heart sounds: Normal heart sounds  No murmur  No gallop  Pulmonary:      Effort: Pulmonary effort is normal  No respiratory distress  Breath sounds: Rhonchi present  No wheezing  Abdominal:      General: Bowel sounds are normal  There is no distension  Palpations: Abdomen is soft  Tenderness: There is no abdominal tenderness  Musculoskeletal:      Right lower leg: No edema  Left lower leg: No edema  Skin:     General: Skin is warm and dry  Neurological:      General: No focal deficit present  Mental Status: She is alert  Psychiatric:         Behavior: Behavior is cooperative  Thought Content: Thought content is delusional          Discussion with Family:  Discussed with the patient above plans  Discharge instructions/Information to patient and family:   See after visit summary for information provided to patient and family  Provisions for Follow-Up Care:  See after visit summary for information related to follow-up care and any pertinent home health orders  Disposition:     Home    For Discharges to [facility] SNF:   · Not Applicable to this Patient - Not Applicable to this Patient    Planned Readmission: No     Discharge Statement:  I spent 35 minutes discharging the patient  This time was spent on the day of discharge  I had direct contact with the patient on the day of discharge  Greater than 50% of the total time was spent examining patient, answering all patient questions, arranging and discussing plan of care with patient as well as directly providing post-discharge instructions  Additional time then spent on discharge activities  Discharge Medications:  See after visit summary for reconciled discharge medications provided to patient and family        ** Please Note: This note has been constructed using a voice recognition system **

## 2021-03-29 DIAGNOSIS — J18.9 PNEUMONIA: ICD-10-CM

## 2021-03-29 LAB — HIV 1+2 AB+HIV1 P24 AG SERPL QL IA: NORMAL

## 2021-03-29 RX ORDER — CEPHALEXIN 500 MG/1
500 CAPSULE ORAL EVERY 6 HOURS SCHEDULED
Qty: 16 CAPSULE | Refills: 0 | Status: SHIPPED | OUTPATIENT
Start: 2021-03-29 | End: 2021-04-02

## 2021-03-29 NOTE — UTILIZATION REVIEW
Notification of Inpatient Admission/Inpatient Authorization Request   This is a Notification of Inpatient Admission for [address]  Be advised that this patient was admitted to our facility under Inpatient Status  Contact Marely Sharma at [phone] for additional admission information  [address] [facility] DEPT  DEDICATED [phone]  Patient Name:   Tien Shah   YOB: 1975       [address]   [address]:   [address] [facility]  Tax ID: [id number]  NPI: [id number] Attending Provider/NPI:  Phone:  Address: Gloria Sin [[id number]]  [phone]  Same as the facility   Place of Service Code: 24 Place of Service Name:  [facility]   Start Date: 3/27/21 1554 Discharge Date & Time: 3/28/2021  1:25 PM    Type of Admission: Inpatient Status Discharge Disposition   (if discharged): Home/Self Care   Patient Diagnoses: Hypokalemia [E87 6]  Pneumonia [J18 9]  SOB (shortness of breath) [R06 02]  Hypoxia [R09 02]   Orders: Admission Orders (From admission, onward)     Ordered        03/27/21 1554  Inpatient Admission  Once                    Assigned Utilization Review Contact: Marely Sharma  Utilization   Network Utilization Review Department  Phone: [phone]; Fax [phone]  Email: [email or web address]   ATTENTION PAYERS: Please call the assigned Utilization  directly with any questions or concerns ALL voicemails in the department are confidential  Send all requests for admission clinical reviews, approved or denied determinations and any other requests to dedicated fax number belonging to the campus where the patient is receiving treatment

## 2021-03-30 ENCOUNTER — TRANSITIONAL CARE MANAGEMENT (OUTPATIENT)
Dept: FAMILY MEDICINE CLINIC | Facility: CLINIC | Age: 46
End: 2021-03-30

## 2021-03-30 ENCOUNTER — OFFICE VISIT (OUTPATIENT)
Dept: FAMILY MEDICINE CLINIC | Facility: CLINIC | Age: 46
End: 2021-03-30
Payer: COMMERCIAL

## 2021-03-30 VITALS
RESPIRATION RATE: 18 BRPM | SYSTOLIC BLOOD PRESSURE: 130 MMHG | OXYGEN SATURATION: 93 % | BODY MASS INDEX: 30.83 KG/M2 | HEIGHT: 64 IN | TEMPERATURE: 97.2 F | HEART RATE: 112 BPM | WEIGHT: 180.6 LBS | DIASTOLIC BLOOD PRESSURE: 94 MMHG

## 2021-03-30 DIAGNOSIS — J18.9 MULTIFOCAL PNEUMONIA: Primary | ICD-10-CM

## 2021-03-30 DIAGNOSIS — R59.0 MEDIASTINAL ADENOPATHY: ICD-10-CM

## 2021-03-30 DIAGNOSIS — E87.6 HYPOKALEMIA: ICD-10-CM

## 2021-03-30 DIAGNOSIS — E11.9 TYPE 2 DIABETES MELLITUS WITHOUT COMPLICATION, WITHOUT LONG-TERM CURRENT USE OF INSULIN (HCC): ICD-10-CM

## 2021-03-30 DIAGNOSIS — R44.3 HALLUCINATION: ICD-10-CM

## 2021-03-30 DIAGNOSIS — F25.0 SCHIZOAFFECTIVE DISORDER, BIPOLAR TYPE (HCC): ICD-10-CM

## 2021-03-30 DIAGNOSIS — R06.00 DYSPNEA, UNSPECIFIED TYPE: ICD-10-CM

## 2021-03-30 DIAGNOSIS — Z72.0 TOBACCO ABUSE: ICD-10-CM

## 2021-03-30 PROCEDURE — 99496 TRANSJ CARE MGMT HIGH F2F 7D: CPT | Performed by: FAMILY MEDICINE

## 2021-03-30 PROCEDURE — 1111F DSCHRG MED/CURRENT MED MERGE: CPT | Performed by: FAMILY MEDICINE

## 2021-03-30 NOTE — PROGRESS NOTES
TCM Call (since 2/27/2021)     Date and time call was made  3/30/2021  8:36 AM    Hospital care reviewed  Records reviewed    Patient was hospitialized at  [address]        Date of Admission  03/27/21    Date of discharge  03/28/21    Diagnosis  acute hypoxemic respiratory failure    Disposition  Home    Were the patients medications reviewed and updated  Yes    Current Symptoms  Cough; Fatigue; Shortness of breath    Cough Severity  Moderate    Shortness of breath severity  Moderate (Comment)  worse when she goes up the steps    Fatigue severity  Moderate      TCM Call (since 2/27/2021)     Post hospital issues  Reduced activity    Should patient be enrolled in anticoag monitoring? No    Scheduled for follow up? Yes    Did you obtain your prescribed medications  Yes    Do you need help managing your prescriptions or medications  No    Is transportation to your appointment needed  No    I have advised the patient to call PCP with any new or worsening symptoms  [employer] LPN        Assessment/Plan:    No problem-specific Assessment & Plan notes found for this encounter  Diagnoses and all orders for this visit:    Type 2 diabetes  Lab Results   Component Value Date    HGBA1C 7 0 (H) 03/28/2021     Reviewed labs done in hospital    Discussed that A1c is consistent with diagnosis of diabetes  She declines medication  Is agreeable to going for diabetic education  Can recheck in 3-4 months  Encouraged eye exam    Foot exam done in office today  Multifocal pneumonia  Reviewed chest xray and CT of chest done during hospitalization  She is taking her antibiotics as prescribed  Still with some shortness of breath  O2 sats in office today normal    Most likely will require repeat imaging to ensure resolution  Would like her to see pulmonology due to this extensive pneumonia and mediastinal adenopathy   Mother and patient were instructed to call me should they have difficulty scheduling appt and I will order any necessary f/u imaging  Tobacco abuse  Discussed smoking cessation  Schizoaffective disorder, bipolar type ([facility] [address] )  Follows with psychiatry  On invega  Hallucination    Hypokalemia  K repleted in hospital  Will recheck this as outpatient  Mediastinal adenopathy  Serum ACE ordered  Subjective:      Patient ID: Lynne Nava is a 51 y o  female who is being seen today as a new patient to this office for hospital f/u visit  She is accompanied to today's visit by her mother who helped provide history  She was hospitalized from 3/27-3/29/21 for bilateral pneumonia with acute hypoxic respiratory failure    She states that she went to the ED because she though she was being poisoned by her neighbor or her landlord  Was short of breath but states that the shortness of breath has been going on for quite some time  There was associated cough  She left the hospital early despite recommendations to stay in inpatient environment longer  During admission she underwent CT of chest which showed: No pulmonary embolism  Bilateral areas of consolidation most severe in the anterobasal segment of the right lower lobe consistent with multilobar pneumonia  Extensive mediastinal and bilateral hilar reactive lymphadenopathy    Nasopharyngeal swab at time of admission was negative for COVID, influenza and RSV  CBC at time of admission showed normal WBC  Her blood cultures were negative x 2  HIV was negative  Her K was low at 2 8 which was repleted  She has known schizoaffective disorder for which she is on monthly Invega injections  Had some hallucinations during her hospitalization and was seen in consultation by psychiatry  She was discharged on zithromax and cephalexin  She states today that she continues to feel short of breath  Sees no change in her breathing since the hospitalization  No cough at all  She denies any chest pain       Her A1c was elevated at 7 0 during this hospitalization  We discussed that this is consistent with diagnosis of diabetes  Patient is adamant about not starting medication today  She is interested in seeing someone for diabetes education  Does agree that if when we recheck her labs in 3-4 months, A1c remains elevated, will start mediation  HPI    The following portions of the patient's history were reviewed and updated as appropriate: She  has a past medical history of Acute hypoxemic respiratory failure ([facility] [address] ) (3/27/2021) and Schizoaffective disorder ([facility] [address] )  She  has no past surgical history on file  She  reports that she has been smoking  She has a 35 00 pack-year smoking history  She has never used smokeless tobacco  She reports that she does not drink alcohol or use drugs    Current Outpatient Medications on File Prior to Visit   Medication Sig    azithromycin (ZITHROMAX) 250 mg tablet Take 1 tablet (250 mg total) by mouth every 24 hours for 4 days Take 1 tablet daily x 4 days    cephalexin (KEFLEX) 500 mg capsule TAKE 1 CAPSULE (500 MG TOTAL) BY MOUTH EVERY 6 (SIX) HOURS FOR 4 DAYS    INVEGA SUSTENNA 156 MG/ML IM injection Inject 156 mg into a muscle every 30 (thirty) days     [DISCONTINUED] carbamide peroxide (DEBROX) 6 5 % otic solution Administer 5 drops into the left ear 2 (two) times a day (Patient not taking: Reported on 2/5/2020)    [DISCONTINUED] fluticasone (FLONASE) 50 mcg/act nasal spray 1 spray into each nostril daily (Patient not taking: Reported on 3/30/2021)    [DISCONTINUED] paliperidone (INVEGA) 3 mg 24 hr tablet Take 1 tablet (3 mg total) by mouth every morning for 7 days    [DISCONTINUED] paliperidone palmitate ER (INVEGA) 234 mg/1 5 mL IM injection Inject 1 5 mL (234 mg total) into a muscle every 30 (thirty) days (Patient not taking: Reported on 5/2/2020)    [DISCONTINUED] sodium chloride (OCEAN) 0 65 % nasal spray 1 spray into each nostril every hour as needed for congestion (Patient not taking: Reported on 3/30/2021)     No current facility-administered medications on file prior to visit  She is allergic to risperidone; bacitracin; effexor [venlafaxine]; haloperidol; lexapro [escitalopram]; zinc; and zyprexa [olanzapine]       Review of Systems   Constitutional: Negative for chills, fatigue, fever and unexpected weight change  Respiratory: Positive for cough and shortness of breath  Negative for wheezing  Cardiovascular: Negative for chest pain, palpitations and leg swelling  Gastrointestinal: Positive for diarrhea  Negative for abdominal pain, constipation, nausea and vomiting  Endocrine: Negative for polydipsia, polyphagia and polyuria  Neurological: Negative for numbness  Objective:      /94 (BP Location: Left arm, Patient Position: Sitting, Cuff Size: Large)   Pulse (!) 112   Temp (!) 97 2 °F (36 2 °C) (Temporal)   Resp 18   Ht 5' 3 5" (1 613 m)   Wt 81 9 kg (180 lb 9 6 oz)   SpO2 93%   BMI 31 49 kg/m²          Physical Exam  Vitals signs and nursing note reviewed  Constitutional:       General: She is not in acute distress  Appearance: Normal appearance  She is not ill-appearing, toxic-appearing or diaphoretic  HENT:      Head: Normocephalic and atraumatic  Right Ear: Tympanic membrane normal       Left Ear: Tympanic membrane normal    Eyes:      Conjunctiva/sclera: Conjunctivae normal    Cardiovascular:      Rate and Rhythm: Normal rate and regular rhythm  Pulses: no weak pulses          Dorsalis pedis pulses are 2+ on the right side and 2+ on the left side  Posterior tibial pulses are 2+ on the right side and 2+ on the left side  Pulmonary:      Effort: Pulmonary effort is normal       Comments: Rales left lung base  Abdominal:      General: Bowel sounds are normal       Palpations: Abdomen is soft  There is no mass  Tenderness: There is no abdominal tenderness  Musculoskeletal:      Right lower leg: No edema  Left lower leg: No edema  Feet:      Right foot:      Skin integrity: Callus (right lateral malleolus) and dry skin present  No ulcer, skin breakdown, erythema or warmth  Left foot:      Skin integrity: No ulcer, skin breakdown, erythema, warmth, callus or dry skin  Skin:     Comments: Left foot with soft corn between 4th and 5th toes   Neurological:      Mental Status: She is alert  Psychiatric:         Mood and Affect: Mood normal          Patient's shoes and socks removed  Right Foot/Ankle   Right Foot Inspection  Skin Exam: skin normal, skin intact, dry skin, callus (right lateral malleolus) and callus (right lateral malleolus) no warmth, no erythema, no maceration, no abnormal color, no pre-ulcer and no ulcer                          Toe Exam: ROM and strength within normal limits  Sensory   Vibration: intact    Monofilament testing: intact  Vascular  Capillary refills: < 3 seconds  The right DP pulse is 2+  The right PT pulse is 2+  Left Foot/Ankle  Left Foot Inspection  Skin Exam: skin normal and skin intactno dry skin, no warmth, no erythema, no maceration, normal color, no pre-ulcer, no ulcer and no callus                         Toe Exam: ROM and strength within normal limits                   Sensory   Vibration: intact  Proprioception: intact  Monofilament: intact  Vascular  Capillary refills: < 3 seconds  The left DP pulse is 2+  The left PT pulse is 2+  Assign Risk Category:  No deformity present; No loss of protective sensation;  No weak pulses       Risk: 0

## 2021-03-31 ENCOUNTER — TELEPHONE (OUTPATIENT)
Dept: FAMILY MEDICINE CLINIC | Facility: CLINIC | Age: 46
End: 2021-03-31

## 2021-03-31 NOTE — TELEPHONE ENCOUNTER
[facility] outreach  Spoke with patient's mother on 4/7/21  She explained that the patient has been hesitant in the past for any education, but now seems more receptive  The mother requests that I call the patient directly tomorrow, to set up an appointment for 1:1 diabetes education over the phone    Michelle Gallagher, SUYAPAn, LDN, [facility]

## 2021-04-02 LAB
BACTERIA BLD CULT: NORMAL
BACTERIA BLD CULT: NORMAL

## 2021-04-06 NOTE — UTILIZATION REVIEW
Notification of Discharge  This is a Notification of Discharge from our facility [address]  Please be advised that this patient has been discharge from our facility  Below you will find the admission and discharge date and time including the patients disposition  PRESENTATION DATE: 3/27/2021  1:18 PM  OBS ADMISSION DATE:   IP ADMISSION DATE: 3/27/21 1554   DISCHARGE DATE: 3/28/2021  1:25 PM  DISPOSITION: Home/Self Care Home/Self Care   Admission Orders listed below:  Admission Orders (From admission, onward)     Ordered        03/27/21 1554  Inpatient Admission  Once                   Please contact the UR Department if additional information is required to close this patient's authorization/case  [address] Utilization Review Department  Main: [phone] x carefully listen to the prompts  All voicemails are confidential   [email or web address]  Send all requests for admission clinical reviews, approved or denied determinations and any other requests to dedicated fax number below belonging to the campus where the patient is receiving treatment   List of dedicated fax numbers:  [address] DENIALS (Administrative/Medical Necessity) [phone]   [address] (Maternity/NICU/Pediatrics) [phone]   [facility] [phone]   Maria Isabel Johnson [phone]   [facility] Marcano [phone]   [address] [phone]   [facility]  [phone]   [address] [facility] [address]  [address] [phone]

## 2021-04-08 ENCOUNTER — TELEPHONE (OUTPATIENT)
Dept: FAMILY MEDICINE CLINIC | Facility: CLINIC | Age: 46
End: 2021-04-08

## 2021-04-08 NOTE — TELEPHONE ENCOUNTER
Patient's mother had explalined to patient that I can provide individual Diabetes education over the telephone  Patient called me and agreed to appointment on 4/14/21  Michelle Gallagher, SUYAPAN, LDN, [employer]    Called patient for today's Virtual Telephone Visit, she did not answer, so I left a message  She just returned my call, and I called her again, leaving another message  I suggested 2 available times for 4/15/21    DDS

## 2021-04-15 ENCOUNTER — TELEMEDICINE (OUTPATIENT)
Dept: FAMILY MEDICINE CLINIC | Facility: CLINIC | Age: 46
End: 2021-04-15

## 2021-04-15 DIAGNOSIS — E11.9 TYPE 2 DIABETES MELLITUS WITHOUT COMPLICATION, WITHOUT LONG-TERM CURRENT USE OF INSULIN (HCC): ICD-10-CM

## 2021-04-15 NOTE — PROGRESS NOTES
Virtual Brief Visit  It was my intent to perform this visit via video technology but the patient was not able to do a video connection so the visit was completed via audio telephone only  Assessment/Plan:  Type 2 Diabetes Class Assessment    HPI: Met with Johny Frank for DSME Initial visit  Marlon Jones has Type 2 Diabetes  Diabetes Assessment  Visit Type: Initial visit  Present at Session: patient   Special Learning Needs: Yes  Barriers to Learning: emotional , patient kept interrupting, poor concentration skills  How do you feel about making lifestyle changes at this time? Patient did not answer  How would you rate your current knowledge of diabetes? poor  How confident are you that will be able to take better control of your diabetes?: fair    How long have you had diabetes? Just about a month  Have you had diabetes education in the past?: No  Do you have any family members with diabetes?: Yes - her mother and second cousin  Do you monitor your blood sugar? no  Type of blood sugar monitor: n/a  How old is your meter?: n/a  How often do you test your blood sugars?:n/a  Do you keep a written record of your blood sugars?  No   Blood sugar log with patient today and reviewed by educator?: No   Blood Sugar ranges:    Fasting: n/a   Before meals: n/a   2 hours after meals: n/a  Any financial concerns pertaining to your diabetes supplies, medication or care?: No  Have you ever experienced hypoglycemia?:  Not sure  Have you ever been hospitalized or gone to the ER due to your blood sugars?: No  How do you treat low blood sugars?: n/a  How do you treat high blood sugars? n/a  Do you wear a Diabetes I D ?: no  Where do you dispose of your sharps (needles,lancetes)?: n/a    Ht Readings from Last 1 Encounters:   03/30/21 5' 3 5" (1 613 m)     Wt Readings from Last 3 Encounters:   03/30/21 81 9 kg (180 lb 9 6 oz)   03/27/21 81 6 kg (180 lb)   05/02/20 81 6 kg (180 lb)        There is no height or weight on file to calculate BMI  Lab Results   Component Value Date    HGBA1C 7 0 (H) 03/28/2021    HGBA1C 6 4 (H) 12/19/2019    HGBA1C 5 9 (H) 08/12/2014       Lab Results   Component Value Date    CHOL 160 08/12/2014     Lab Results   Component Value Date    HDL 23 (L) 02/04/2020    HDL 21 (L) 12/19/2019    HDL 30 08/12/2014     Lab Results   Component Value Date    LDLCALC 130 (H) 02/04/2020    LDLCALC 131 (H) 12/19/2019    LDLCALC 96 08/12/2014     Lab Results   Component Value Date    TRIG 134 02/04/2020    TRIG 114 12/19/2019    TRIG 172 08/12/2014     No results found for: CHOLHDL  No results found for: Walter Bills    Weight Change: No    Diet Assessment    Do you follow any special diet presently?: No  Patient admits she used "eat regular Pepsi " all day  She now has switched to Organic apple juice which she dilutes with water  When I asked her how much apple juice she drinks, she answered " about a half a 1 5 liter bottle"  She states she lives in a room with a very small "dorm size" fridge, and a microwave  She does not have a full kitchen to prepare meals  When I asked her about her "typical day" of eating, she states she really doesn't have a meal schedule  She eats fresh fruits, Microwave meals & prepared meals from [facility]  I gave the patient specific examples of foods/ meals that are easy to prepare (natural peanut butter or tuna packets  For sandwiches, small baking potatoes to bake in the microwave, etc ) I will provide written information  I also asked if she might consider "Meals on Wheels" (if she qualifies), but the patient declined  She does receive "SNAP(food stamps)" , which is helpful  Who cooks at home?:  patient states:"I can't really cook, just warm up food in the microwave "  Who does the grocery shopping?: mother   How frequently do you eat out?: not too often    Activity Assessment    Exercise: walks her dog,at her mother's house, on occasion  Minimal physical activity  Lifestyle/Social Assessment    Racial/ethnic group:                                       Primary Language: English  Marital Status: Single  Education Level: Bachelor's Degree   Work status: Disabled  Type of job and hours: n/a  Who lives in your household?: patient states she lives alone in a single room  Who is you primary support person(s): parent(s)   Describe your quality of life currently?: did not discuss, patient states she wants to get better, but feels she is not sure how all the time  Any concerns for your safety?: Yes - patient states there are people around , taking pictures, and she doesn't want to let anybody come to the door  I asked if she has told her mother this, and she said "yes"  Any Worship or cultural practices that may affect your diabetes care: No  Do you have a decrease or loss of hearing?: No  Do you have a decrease or loss of vision?: No  When was the last time you had an ophthalmology exam?: 10 years ago  When was the last time you had dental exam?: "I don't like going to the dentist  At least 6 years ago "  Do you check your feet for cracks, sores, debris?: No  When was the last time you had podiatry or foot exam?: "my doctor checked my feet "   Last flu shot?: n/a  Pneumonia shot?: No      The patient's history was reviewed and updated as appropriate: allergies, current medications  Intervention    Diabetes Overview :   Claudia Mejia was instructed on basic concepts of diabetes, including identifying role of diabetes self management, basic pathophysiology and types of diabetes, A1c  Claudia Mejia has fair understanding of material covered  Taking Medications: Patient is not taking any medications for her Diabetes  Monitoring Blood Sugars  Instructions for Meter Teaching- Patient is not monitoring her blood sugars   Goal Blood Sugars:   Premeal , even better <110  2hr after a meal <180, even better <140  A1C <7%, even better <6 5%      Hypoglycemia: Instructed patient on definition/risk of hypoglycemia, treatment, causes/symptoms, when to notify provider of lows, prevention of hypoglycemia and exercise precautions  Comments: Vero verbalizes fair understanding of hypoglycemia concepts  Physical Activity: Discussed benefits of physical activity to optimize blood glucose control, encouraged activity at patient is physically able  Always consult a physician prior to starting an exercise program   Comments: Vika Brock understanding of hypoglycemia concepts        Diabetes Education Record  Charmayne Ran received the following handouts: Basic diabetes education and "My Plate" nutrition education  Also provided list of very simple menu ideas  Patient response to instruction    Marge Beauchamp  Expected Compliancepoor because of living arrangement and mental health status  Thank you for referring your patient to [facility], it was a pleasure working with them today  Please feel free to call with any questions or concerns at   [facility]   clinic at [address] [facility]    Problem List Items Addressed This Visit        Endocrine    Type 2 diabetes mellitus without complication, without long-term current use of insulin ([facility] [address] )                Reason for visit is   Chief Complaint   Patient presents with    Virtual Brief Visit        Encounter provider [address]  [facility] [address]      Recent Visits  Date Type Provider Dept   04/08/21 Telephone [facility] [address] recent visits within past 7 days and meeting all other requirements     Today's Visits  Date Type Provider Dept   04/15/21 Telemedicine [facility] today's visits and meeting all other requirements     Future Appointments  No visits were found meeting these conditions     Showing future appointments within next 150 days and meeting all other requirements        After connecting through telephone and patient was informed that this is not a secure, HIPAA-compliant platform  She agrees to proceed  , the patient was identified by name and date of birth  Javi Chan was informed that this is a telemedicine visit and that the visit is being conducted through telephone and patient was informed that this is not a secure, HIPAA-compliant platform  She agrees to proceed     My office door was closed  No one else was in the room  She acknowledged consent and understanding of privacy and security of the platform  The patient has agreed to participate and understands she can discontinue the visit at any time  Patient is aware this is a billable service  Subjective    Javi Chan is a 51 y o  female with type 2 diabetes  HPI     Past Medical History:   Diagnosis Date    Acute hypoxemic respiratory failure ([facility] [address] ) 3/27/2021    Schizoaffective disorder (HCC)        No past surgical history on file  Current Outpatient Medications   Medication Sig Dispense Refill    INVEGA SUSTENNA 156 MG/ML IM injection Inject 156 mg into a muscle every 30 (thirty) days        No current facility-administered medications for this visit  Allergies   Allergen Reactions    Risperidone Shortness Of Breath     Previously tolerated [place] and Invega tablets without a problem   Bacitracin     Effexor [Venlafaxine] Other (See Comments)     Causes elevated glucose    Haloperidol Other (See Comments)     Tardive Dyskinesia      Lexapro [Escitalopram] Other (See Comments)     hyperpigmentation    Zinc     Zyprexa [Olanzapine] Other (See Comments)     Causes elevated glucose       Review of Systems    There were no vitals filed for this visit  I spent 30 minutes with patient today in which greater than 50% of the time was spent in counseling/coordination of care regarding type 2 diabetes      VIRTUAL VISIT [address] acknowledges that she has consented to an online visit or consultation  She understands that the online visit is based solely on information provided by her, and that, in the absence of a face-to-face physical evaluation by the physician, the diagnosis she receives is both limited and provisional in terms of accuracy and completeness  This is not intended to replace a full medical face-to-face evaluation by the physician  Ta Pope understands and accepts these terms

## 2021-04-20 ENCOUNTER — OFFICE VISIT (OUTPATIENT)
Dept: PULMONOLOGY | Facility: CLINIC | Age: 46
End: 2021-04-20
Payer: COMMERCIAL

## 2021-04-20 VITALS
HEIGHT: 64 IN | DIASTOLIC BLOOD PRESSURE: 64 MMHG | TEMPERATURE: 96.5 F | WEIGHT: 183.8 LBS | OXYGEN SATURATION: 100 % | HEART RATE: 114 BPM | BODY MASS INDEX: 31.38 KG/M2 | SYSTOLIC BLOOD PRESSURE: 110 MMHG

## 2021-04-20 DIAGNOSIS — Z72.0 TOBACCO ABUSE: ICD-10-CM

## 2021-04-20 DIAGNOSIS — R05.8 UPPER AIRWAY COUGH SYNDROME: ICD-10-CM

## 2021-04-20 DIAGNOSIS — J18.9 MULTIFOCAL PNEUMONIA: ICD-10-CM

## 2021-04-20 DIAGNOSIS — R59.0 MEDIASTINAL LYMPHADENOPATHY: Primary | ICD-10-CM

## 2021-04-20 PROCEDURE — 99204 OFFICE O/P NEW MOD 45 MIN: CPT | Performed by: INTERNAL MEDICINE

## 2021-04-20 PROCEDURE — 1111F DSCHRG MED/CURRENT MED MERGE: CPT | Performed by: INTERNAL MEDICINE

## 2021-04-20 NOTE — ASSESSMENT & PLAN NOTE
Due to a constant sense of nasal dryness, I advised against flonase, but advised a second generation antihistamine like Claritin, Allegra or Zyrtec

## 2021-04-20 NOTE — PROGRESS NOTES
Pulmonary Consultation   David Butler 51 y o  female MRN: [id number]  4/20/2021      Assessment:    Multifocal pneumonia  Clinically, Savanah Reynolds has fully recovered from this  We will assess for radiographic resolution on the upcoming CT scan  Mediastinal lymphadenopathy  This is almost certainly reactive; however, we will re-evaluate on follow up imaging  Aside from smoking she does not have an obvious reason to have developed multifocal pneumonia at a young age  Unfortunately, not much workup was able to be done initially  Will assess for alternate diagnoses on follow up  Will call with results, no appointment needed  Tobacco abuse  She is pre-contemplative with respect to quitting  She was interested in using nicotine replacement therapy with gum; 3 boxes of samples provided  I spent about 5 minutes discussing tobacco cessation with Savanah Reynolds  Upper airway cough syndrome  Due to a constant sense of nasal dryness, I advised against flonase, but advised a second generation antihistamine like Claritin, Allegra or Zyrtec  Plan:    Diagnoses and all orders for this visit:    Mediastinal lymphadenopathy  -     CT chest without contrast; Future    Multifocal pneumonia    Tobacco abuse  -     nicotine polacrilex (NICORETTE) 4 mg gum; Chew 1 each (4 mg total) as needed for smoking cessation    No follow-ups on file  History of Present Illness   HPI:  David Butler is a 51 y o  female who presents for evaluation after being recently diagnosed with multifocal pneumonia  Savanah Reynolds was accompanied by her mother on the visit today  She is an otherwise medically healthy current smoker with no prior history of pneumonia or other lung conditions who was admitted to our [address] [place] [address],[address] with multifocal pneumonia on 3/27  She unfortunately did not stay long, was having some delusional thoughts, requested to leave AMA after only briefly requiring oxygen and was given antibiotics to go home with    On her CT scan, she had some enlarged mediastinal and hilar lymph nodes which were likely reactive  She recovered from this acute episode and has returned essentially to her baseline  She reports she smokes about 18 cigarettes a day currently  She does not suffer from shortness of breath at baseline and has not had a history of recurrent pneumonias or infections in the past   She has never been diagnosed with COPD or asthma  She does not have B-type symptoms suggestive of malignancy  She does have a baseline cough which she feels is from post-nasal drip  She also has some throat irritation related to that post nasal drip which she notices commonly as well when doing thingsl rosa drinking hot tea  Review of Systems   Constitutional: Negative for chills, fever and unexpected weight change  HENT: Positive for postnasal drip, rhinorrhea, sinus pressure and sneezing  Respiratory: Positive for cough  Negative for chest tightness, shortness of breath and wheezing  Allergic/Immunologic: Positive for environmental allergies  All other systems reviewed and are negative  Historical Information   Past Medical History:   Diagnosis Date    Acute hypoxemic respiratory failure ([facility] ) 3/27/2021    Schizoaffective disorder ([place] [address] )      History reviewed  No pertinent surgical history  History reviewed  No pertinent family history  Meds/Allergies     Current Outpatient Medications:     INVEGA SUSTENNA 156 MG/ML IM injection, Inject 156 mg into a muscle every 30 (thirty) days , Disp: , Rfl:     nicotine polacrilex (NICORETTE) 4 mg gum, Chew 1 each (4 mg total) as needed for smoking cessation, Disp: 300 each, Rfl: 0  Allergies   Allergen Reactions    Risperidone Shortness Of Breath     Previously tolerated [place] and Invega tablets without a problem      Bacitracin     Effexor [Venlafaxine] Other (See Comments)     Causes elevated glucose    Haloperidol Other (See Comments)     Tardive Dyskinesia      Lexapro [Escitalopram] Other (See Comments)     hyperpigmentation    Zinc     Zyprexa [Olanzapine] Other (See Comments)     Causes elevated glucose       Vitals: Blood pressure 110/64, pulse (!) 114, temperature (!) 96 5 °F (35 8 °C), temperature source Temporal, height 5' 3 5" (1 613 m), weight 83 4 kg (183 lb 12 8 oz), SpO2 100 %, not currently breastfeeding  Body mass index is 32 05 kg/m²  Oxygen Therapy  SpO2: 100 %  Oxygen Therapy: None (Room air)      Physical Exam  Physical Exam  Vitals signs reviewed  Constitutional:       General: She is not in acute distress  Appearance: Normal appearance  She is well-developed  She is not ill-appearing  HENT:      Head: Normocephalic and atraumatic  Eyes:      General: No scleral icterus  Conjunctiva/sclera: Conjunctivae normal    Neck:      Musculoskeletal: Neck supple  Vascular: No JVD  Cardiovascular:      Rate and Rhythm: Normal rate and regular rhythm  Heart sounds: Normal heart sounds  No murmur  No friction rub  No gallop  Pulmonary:      Effort: Pulmonary effort is normal  No respiratory distress  Breath sounds: Normal breath sounds  No wheezing or rales  Musculoskeletal:      Right lower leg: No edema  Left lower leg: No edema  Skin:     General: Skin is warm and dry  Findings: No rash  Neurological:      General: No focal deficit present  Mental Status: She is alert and oriented to person, place, and time  Mental status is at baseline  Psychiatric:         Mood and Affect: Mood normal          Behavior: Behavior normal          Labs: I have personally reviewed pertinent lab results    Lab Results   Component Value Date    WBC 4 99 03/28/2021    HGB 13 5 03/28/2021    HCT 40 1 03/28/2021    MCV 82 03/28/2021     03/28/2021     Lab Results   Component Value Date    GLUCOSE 153 (H) 08/12/2014    CALCIUM 8 3 (L) 03/28/2021     (L) 08/12/2014    K 3 2 (L) 03/28/2021    CO2 31 03/28/2021    CL 97 03/28/2021    BUN 3 (L) 03/28/2021    CREATININE 0 68 03/28/2021     No results found for: IGE  Lab Results   Component Value Date    ALT 26 03/28/2021    AST 36 03/28/2021    ALKPHOS 47 0 03/28/2021    BILITOT 0 18 (L) 08/12/2014     Imaging and other studies: I have personally reviewed pertinent films in PACS  CT scan reviewed personally and with patient  Multifocal infiltrates with densest consolidation in anterior RLL  EKG, Pathology, and Other Studies: I have personally reviewed pertinent reports  [facility]

## 2021-04-20 NOTE — ASSESSMENT & PLAN NOTE
This is almost certainly reactive; however, we will re-evaluate on follow up imaging  Aside from smoking she does not have an obvious reason to have developed multifocal pneumonia at a young age  Unfortunately, not much workup was able to be done initially  Will assess for alternate diagnoses on follow up  Will call with results, no appointment needed

## 2021-04-20 NOTE — ASSESSMENT & PLAN NOTE
She is pre-contemplative with respect to quitting  She was interested in using nicotine replacement therapy with gum; 3 boxes of samples provided  I spent about 5 minutes discussing tobacco cessation with Christa Feliz

## 2021-04-20 NOTE — ASSESSMENT & PLAN NOTE
Clinically, Kylee Braga has fully recovered from this  We will assess for radiographic resolution on the upcoming CT scan

## 2021-04-24 LAB
ACE SERPL-CCNC: 21 U/L (ref 9–67)
BUN SERPL-MCNC: 4 MG/DL (ref 7–25)
BUN/CREAT SERPL: 5 (CALC) (ref 6–22)
CALCIUM SERPL-MCNC: 8.9 MG/DL (ref 8.6–10.2)
CHLORIDE SERPL-SCNC: 104 MMOL/L (ref 98–110)
CO2 SERPL-SCNC: 25 MMOL/L (ref 20–32)
CREAT SERPL-MCNC: 0.78 MG/DL (ref 0.5–1.1)
GLUCOSE SERPL-MCNC: 114 MG/DL (ref 65–99)
POTASSIUM SERPL-SCNC: 4 MMOL/L (ref 3.5–5.3)
SL AMB EGFR AFRICAN AMERICAN: 105 ML/MIN/1.73M2
SL AMB EGFR NON AFRICAN AMERICAN: 91 ML/MIN/1.73M2
SODIUM SERPL-SCNC: 137 MMOL/L (ref 135–146)

## 2021-04-27 ENCOUNTER — OFFICE VISIT (OUTPATIENT)
Dept: FAMILY MEDICINE CLINIC | Facility: CLINIC | Age: 46
End: 2021-04-27
Payer: COMMERCIAL

## 2021-04-27 VITALS
OXYGEN SATURATION: 95 % | BODY MASS INDEX: 31.24 KG/M2 | TEMPERATURE: 98.1 F | SYSTOLIC BLOOD PRESSURE: 130 MMHG | RESPIRATION RATE: 17 BRPM | HEIGHT: 64 IN | DIASTOLIC BLOOD PRESSURE: 78 MMHG | HEART RATE: 122 BPM | WEIGHT: 183 LBS

## 2021-04-27 DIAGNOSIS — J34.89 RHINORRHEA: ICD-10-CM

## 2021-04-27 DIAGNOSIS — Z28.310 COVID-19 VACCINE SERIES DECLINED: ICD-10-CM

## 2021-04-27 DIAGNOSIS — Z12.4 CERVICAL CANCER SCREENING: ICD-10-CM

## 2021-04-27 DIAGNOSIS — E66.09 CLASS 1 OBESITY DUE TO EXCESS CALORIES WITHOUT SERIOUS COMORBIDITY WITH BODY MASS INDEX (BMI) OF 32.0 TO 32.9 IN ADULT: ICD-10-CM

## 2021-04-27 DIAGNOSIS — E87.6 HYPOKALEMIA: ICD-10-CM

## 2021-04-27 DIAGNOSIS — Z28.21 COVID-19 VACCINE SERIES DECLINED: ICD-10-CM

## 2021-04-27 DIAGNOSIS — Z78.9 VEGETARIAN DIET: ICD-10-CM

## 2021-04-27 DIAGNOSIS — E11.9 TYPE 2 DIABETES MELLITUS WITHOUT COMPLICATION, WITHOUT LONG-TERM CURRENT USE OF INSULIN (HCC): Primary | ICD-10-CM

## 2021-04-27 DIAGNOSIS — Z72.0 TOBACCO ABUSE: ICD-10-CM

## 2021-04-27 DIAGNOSIS — R59.0 MEDIASTINAL LYMPHADENOPATHY: ICD-10-CM

## 2021-04-27 DIAGNOSIS — N39.3 STRESS INCONTINENCE: ICD-10-CM

## 2021-04-27 DIAGNOSIS — Z12.31 ENCOUNTER FOR SCREENING MAMMOGRAM FOR MALIGNANT NEOPLASM OF BREAST: ICD-10-CM

## 2021-04-27 DIAGNOSIS — Z00.00 MEDICARE ANNUAL WELLNESS VISIT, SUBSEQUENT: ICD-10-CM

## 2021-04-27 DIAGNOSIS — B37.3 CANDIDIASIS OF VAGINA: ICD-10-CM

## 2021-04-27 DIAGNOSIS — J18.9 MULTIFOCAL PNEUMONIA: ICD-10-CM

## 2021-04-27 PROBLEM — R05.8 UPPER AIRWAY COUGH SYNDROME: Status: RESOLVED | Noted: 2021-04-20 | Resolved: 2021-04-27

## 2021-04-27 PROBLEM — E66.811 CLASS 1 OBESITY DUE TO EXCESS CALORIES WITHOUT SERIOUS COMORBIDITY IN ADULT: Status: ACTIVE | Noted: 2021-04-27

## 2021-04-27 PROCEDURE — 3725F SCREEN DEPRESSION PERFORMED: CPT | Performed by: FAMILY MEDICINE

## 2021-04-27 PROCEDURE — 3008F BODY MASS INDEX DOCD: CPT | Performed by: FAMILY MEDICINE

## 2021-04-27 PROCEDURE — 99214 OFFICE O/P EST MOD 30 MIN: CPT | Performed by: FAMILY MEDICINE

## 2021-04-27 PROCEDURE — G0439 PPPS, SUBSEQ VISIT: HCPCS | Performed by: FAMILY MEDICINE

## 2021-04-27 RX ORDER — FLUTICASONE PROPIONATE 50 MCG
1 SPRAY, SUSPENSION (ML) NASAL DAILY
Qty: 1 BOTTLE | Refills: 5 | Status: SHIPPED | OUTPATIENT
Start: 2021-04-27 | End: 2021-08-27

## 2021-04-27 RX ORDER — CETIRIZINE HYDROCHLORIDE 10 MG/1
10 TABLET ORAL DAILY
Qty: 30 TABLET | Refills: 5 | Status: SHIPPED | OUTPATIENT
Start: 2021-04-27 | End: 2021-09-23

## 2021-04-27 RX ORDER — FLUCONAZOLE 100 MG/1
100 TABLET ORAL DAILY
Qty: 1 TABLET | Refills: 1 | Status: SHIPPED | OUTPATIENT
Start: 2021-04-27 | End: 2021-05-06

## 2021-04-27 NOTE — PROGRESS NOTES
Assessment/Plan:    No problem-specific Assessment & Plan notes found for this encounter  Diagnoses and all orders for this visit:    Type 2 diabetes mellitus without complication, without long-term current use of insulin ([facility] [address] )  -     Ambulatory referral to Ophthalmology; Future  -     Comprehensive metabolic panel; Future  -     Hemoglobin A1C; Future  -     Microalbumin / creatinine urine ratio  -     Lipid panel; Future  Lab Results   Component Value Date    HGBA1C 7 0 (H) 03/28/2021     Patient continues to decline medication for this  She was referred for diabetic education at her last OV  She has not started yet  Classes start in May  She is trying to watch her diet  Multifocal pneumonia  Resolved clinically  She did see pulmonology in consultation and has f/u CT scan scheduled for June  Mediastinal lymphadenopathy  Reactive  Her serum ACE was normal    Tobacco abuse  We discussed smoking cessation  Hypokalemia  Lab Results   Component Value Date     (L) 08/12/2014    SODIUM 137 04/20/2021    K 4 0 04/20/2021     04/20/2021    CO2 25 04/20/2021    ANIONGAP 6 08/12/2014    AGAP 9 03/28/2021    BUN 4 (L) 04/20/2021    CREATININE 0 78 04/20/2021    GLUC 114 (H) 04/20/2021    GLUF 119 (H) 02/07/2020    CALCIUM 8 9 04/20/2021    AST 36 03/28/2021    ALT 26 03/28/2021    ALKPHOS 47 0 03/28/2021    PROT 7 4 08/12/2014    TP 7 3 03/28/2021    BILITOT 0 18 (L) 08/12/2014    TBILI 0 44 03/28/2021    EGFR 105 03/28/2021     Resolved  Class 1 obesity due to excess calories without serious comorbidity with body mass index (BMI) of 32 0 to 32 9 in adult  Discussed diet/exercise  Walks dog for 1 hour per week which we discussed is not enough exercise  Encounter for screening mammogram for malignant neoplasm of breast  -     Mammo screening bilateral w cad;  Future  Agrees to go for mammogram but states that she will not go every year  Candidiasis of vagina  -     fluconazole (DIFLUCAN) 100 mg tablet; Take 1 tablet (100 mg total) by mouth daily for 1 day  rx for diflucan   Cervical cancer screening  -     Ambulatory referral to Gynecology; Future  Wants to see gyn for pap  Referral placed Medicare annual wellness visit, subsequent  See separate note  COVID-19 vaccine series declined  Declines covid vaccine   Stress incontinence  Recommend kegel exercises  BMI Counseling: Body mass index is 31 91 kg/m²  The BMI is above normal  Nutrition recommendations include decreasing portion sizes and encouraging healthy choices of fruits and vegetables  Exercise recommendations include exercising 3-5 times per week  No pharmacotherapy was ordered  Subjective:      Patient ID: Lili Lepe is a 51 y o  female with type 2 diabetes, schizoaffective disorder and recent hospitalization for multifocal pneumonia  She was seen as a new patient on 3/30/21 for her hospital f/u visit  She was referred to see pulmonology for her multifocal pneumonia and mediastinal adenopathy  Pulmonology saw patient on 4/20/21 and felt that patient had recovered from her pneumonia and that the mediastinal adenopathy was likely reactive  She does have f/u imaging scheduled for 6/25/21 and will f/u with pulmonology after that scan  She continues to smoke  Pulmonologist gave her nicotine gum  She is not ready to quit  No chest pain, palpitations  No shortness of breath  Occasional cough  Productive of sputum  Has some stress incontinence  Ongoing for years  She has to wear pads  Gets some vulvar itching  Some rhinorrhea and post nasal drip  Requests refill on her flonase  HPI    The following portions of the patient's history were reviewed and updated as appropriate:   She  has a past medical history of Acute hypoxemic respiratory failure ([facility] [address] ) (3/27/2021) and Schizoaffective disorder ([facility] [address] )  She  has no past surgical history on file  She  reports that she has been smoking   She has a 35 00 pack-year smoking history  She has never used smokeless tobacco  She reports that she does not drink alcohol or use drugs  Current Outpatient Medications on File Prior to Visit   Medication Sig    INVEGA SUSTENNA 156 MG/ML IM injection Inject 156 mg into a muscle every 30 (thirty) days     nicotine polacrilex (NICORETTE) 4 mg gum Chew 1 each (4 mg total) as needed for smoking cessation (Patient not taking: Reported on 4/27/2021)     No current facility-administered medications on file prior to visit  She is allergic to risperidone; bacitracin; effexor [venlafaxine]; haloperidol; lexapro [escitalopram]; zinc; and zyprexa [olanzapine]       Review of Systems      Objective: There were no vitals taken for this visit  Physical Exam  Vitals signs and nursing note reviewed  Constitutional:       General: She is not in acute distress  Appearance: Normal appearance  She is obese  She is not ill-appearing, toxic-appearing or diaphoretic  HENT:      Head: Normocephalic and atraumatic  Nose: Nose normal       Mouth/Throat:      Mouth: Mucous membranes are dry  Pharynx: No oropharyngeal exudate or posterior oropharyngeal erythema  Eyes:      Extraocular Movements: Extraocular movements intact  Conjunctiva/sclera: Conjunctivae normal       Pupils: Pupils are equal, round, and reactive to light  Cardiovascular:      Rate and Rhythm: Normal rate and regular rhythm  Pulses: Normal pulses  Pulmonary:      Breath sounds: Normal breath sounds  Abdominal:      General: Bowel sounds are normal       Palpations: Abdomen is soft  Musculoskeletal:      Right lower leg: No edema  Left lower leg: No edema  Skin:     General: Skin is warm and dry  Findings: No rash  Neurological:      General: No focal deficit present  Mental Status: She is alert     Psychiatric:         Mood and Affect: Mood normal

## 2021-04-27 NOTE — PATIENT INSTRUCTIONS
Medicare Preventive Visit Patient Instructions  Thank you for completing your Welcome to Medicare Visit or Medicare Annual Wellness Visit today  Your next wellness visit will be due in one year (4/28/2022)  The screening/preventive services that you may require over the next 5-10 years are detailed below  Some tests may not apply to you based off risk factors and/or age  Screening tests ordered at today's visit but not completed yet may show as past due  Also, please note that scanned in results may not display below  Preventive Screenings:  Service Recommendations Previous Testing/Comments   Colorectal Cancer Screening  * Colonoscopy    * Fecal Occult Blood Test (FOBT)/Fecal Immunochemical Test (FIT)  * Fecal DNA/Cologuard Test  * Flexible Sigmoidoscopy Age: 54-65 years old   Colonoscopy: every 10 years (may be performed more frequently if at higher risk)  OR  FOBT/FIT: every 1 year  OR  Cologuard: every 3 years  OR  Sigmoidoscopy: every 5 years  Screening may be recommended earlier than age 48 if at higher risk for colorectal cancer  Also, an individualized decision between you and your healthcare provider will decide whether screening between the ages of 74-80 would be appropriate  Colonoscopy: Not on file  FOBT/FIT: Not on file  Cologuard: Not on file  Sigmoidoscopy: Not on file          Breast Cancer Screening Age: 36 years old  Frequency: every 1-2 years  Not required if history of left and right mastectomy Mammogram: 08/21/2014        Cervical Cancer Screening Between the ages of 21-29, pap smear recommended once every 3 years  Between the ages of 33-67, can perform pap smear with HPV co-testing every 5 years     Recommendations may differ for women with a history of total hysterectomy, cervical cancer, or abnormal pap smears in past  Pap Smear: Not on file        Hepatitis C Screening Once for adults born between [facility]  More frequently in patients at high risk for Hepatitis C Hep C Antibody: 08/12/2014    Screening Current   Diabetes Screening 1-2 times per year if you're at risk for diabetes or have pre-diabetes Fasting glucose: 119 mg/dL   A1C: 7 0 %    Screening Not Indicated  History Diabetes   Cholesterol Screening Once every 5 years if you don't have a lipid disorder  May order more often based on risk factors  Lipid panel: 02/04/2020    Screening Current     Other Preventive Screenings Covered by Medicare:  1  Abdominal Aortic Aneurysm (AAA) Screening: covered once if your at risk  You're considered to be at risk if you have a family history of AAA  2  Lung Cancer Screening: covers low dose CT scan once per year if you meet all of the following conditions: (1) Age 50-69; (2) No signs or symptoms of lung cancer; (3) Current smoker or have quit smoking within the last 15 years; (4) You have a tobacco smoking history of at least 30 pack years (packs per day multiplied by number of years you smoked); (5) You get a written order from a healthcare provider  3  Glaucoma Screening: covered annually if you're considered high risk: (1) You have diabetes OR (2) Family history of glaucoma OR (3)  aged 48 and older OR (3)  American aged 72 and older  3  Osteoporosis Screening: covered every 2 years if you meet one of the following conditions: (1) You're estrogen deficient and at risk for osteoporosis based off medical history and other findings; (2) Have a vertebral abnormality; (3) On glucocorticoid therapy for more than 3 months; (4) Have primary hyperparathyroidism; (5) On osteoporosis medications and need to assess response to drug therapy  · Last bone density test (DXA Scan): Not on file  5  HIV Screening: covered annually if you're between the age of 12-76  Also covered annually if you are younger than 13 and older than 72 with risk factors for HIV infection  For pregnant patients, it is covered up to 3 times per pregnancy      Immunizations:  Immunization Recommendations Influenza Vaccine Annual influenza vaccination during flu season is recommended for all persons aged >= 6 months who do not have contraindications   Pneumococcal Vaccine (Prevnar and Pneumovax)  * Prevnar = PCV13  * Pneumovax = PPSV23   Adults 25-60 years old: 1-3 doses may be recommended based on certain risk factors  Adults 72 years old: Prevnar (PCV13) vaccine recommended followed by Pneumovax (PPSV23) vaccine  If already received PPSV23 since turning 65, then PCV13 recommended at least one year after PPSV23 dose  Hepatitis B Vaccine 3 dose series if at intermediate or high risk (ex: diabetes, end stage renal disease, liver disease)   Tetanus (Td) Vaccine - COST NOT COVERED BY MEDICARE PART B Following completion of primary series, a booster dose should be given every 10 years to maintain immunity against tetanus  Td may also be given as tetanus wound prophylaxis  Tdap Vaccine - COST NOT COVERED BY MEDICARE PART B Recommended at least once for all adults  For pregnant patients, recommended with each pregnancy  Shingles Vaccine (Shingrix) - COST NOT COVERED BY MEDICARE PART B  2 shot series recommended in those aged 48 and above     Health Maintenance Due:      Topic Date Due    Cervical Cancer Screening  Never done    MAMMOGRAM  08/21/2015    HIV Screening  Completed    Hepatitis C Screening  Completed     Immunizations Due:      Topic Date Due    Pneumococcal Vaccine: Pediatrics (0 to 5 Years) and At-Risk Patients (6 to 59 Years) (1 of 1 - PPSV23) Never done    COVID-19 Vaccine (1) Never done     Advance Directives   What are advance directives? Advance directives are legal documents that state your wishes and plans for medical care  These plans are made ahead of time in case you lose your ability to make decisions for yourself  Advance directives can apply to any medical decision, such as the treatments you want, and if you want to donate organs  What are the types of advance directives? There are many types of advance directives, and each state has rules about how to use them  You may choose a combination of any of the following:  · Living will: This is a written record of the treatment you want  You can also choose which treatments you do not want, which to limit, and which to stop at a certain time  This includes surgery, medicine, IV fluid, and tube feedings  · Durable power of  for healthcare [facility]): This is a written record that states who you want to make healthcare choices for you when you are unable to make them for yourself  This person, called a proxy, is usually a family member or a friend  You may choose more than 1 proxy  · Do not resuscitate (DNR) order:  A DNR order is used in case your heart stops beating or you stop breathing  It is a request not to have certain forms of treatment, such as CPR  A DNR order may be included in other types of advance directives  · Medical directive: This covers the care that you want if you are in a coma, near death, or unable to make decisions for yourself  You can list the treatments you want for each condition  Treatment may include pain medicine, surgery, blood transfusions, dialysis, IV or tube feedings, and a ventilator (breathing machine)  · Values history: This document has questions about your views, beliefs, and how you feel and think about life  This information can help others choose the care that you would choose  Why are advance directives important? An advance directive helps you control your care  Although spoken wishes may be used, it is better to have your wishes written down  Spoken wishes can be misunderstood, or not followed  Treatments may be given even if you do not want them  An advance directive may make it easier for your family to make difficult choices about your care  Urinary Incontinence   Urinary incontinence (UI)  is when you lose control of your bladder   UI develops because your bladder cannot store or empty urine properly  The 3 most common types of UI are stress incontinence, urge incontinence, or both  Medicines:   · May be given to help strengthen your bladder control  Report any side effects of medication to your healthcare provider  Do pelvic muscle exercises often:  Your pelvic muscles help you stop urinating  Squeeze these muscles tight for 5 seconds, then relax for 5 seconds  Gradually work up to squeezing for 10 seconds  Do 3 sets of 15 repetitions a day, or as directed  This will help strengthen your pelvic muscles and improve bladder control  Train your bladder:  Go to the bathroom at set times, such as every 2 hours, even if you do not feel the urge to go  You can also try to hold your urine when you feel the urge to go  For example, hold your urine for 5 minutes when you feel the urge to go  As that becomes easier, hold your urine for 10 minutes  Self-care:   · Keep a UI record  Write down how often you leak urine and how much you leak  Make a note of what you were doing when you leaked urine  · Drink liquids as directed  You may need to limit the amount of liquid you drink to help control your urine leakage  Do not drink any liquid right before you go to bed  Limit or do not have drinks that contain caffeine or alcohol  · Prevent constipation  Eat a variety of high-fiber foods  Good examples are high-fiber cereals, beans, vegetables, and whole-grain breads  Walking is the best way to trigger your intestines to have a bowel movement  · Exercise regularly and maintain a healthy weight  Weight loss and exercise will decrease pressure on your bladder and help you control your leakage  · Use a catheter as directed  to help empty your bladder  A catheter is a tiny, plastic tube that is put into your bladder to drain your urine  · Go to behavior therapy as directed  Behavior therapy may be used to help you learn to control your urge to urinate      Cigarette Smoking and Your Health Risks to your health if you smoke:  Nicotine and other chemicals found in tobacco damage every cell in your body  Even if you are a light smoker, you have an increased risk for cancer, heart disease, and lung disease  If you are pregnant or have diabetes, smoking increases your risk for complications  Benefits to your health if you stop smoking:   · You decrease respiratory symptoms such as coughing, wheezing, and shortness of breath  · You reduce your risk for cancers of the lung, mouth, throat, kidney, bladder, pancreas, stomach, and cervix  If you already have cancer, you increase the benefits of chemotherapy  You also reduce your risk for cancer returning or a second cancer from developing  · You reduce your risk for heart disease, blood clots, heart attack, and stroke  · You reduce your risk for lung infections, and diseases such as pneumonia, asthma, chronic bronchitis, and emphysema  · Your circulation improves  More oxygen can be delivered to your body  If you have diabetes, you lower your risk for complications, such as kidney, artery, and eye diseases  You also lower your risk for nerve damage  Nerve damage can lead to amputations, poor vision, and blindness  · You improve your body's ability to heal and to fight infections  For more information and support to stop smoking:   · [employer]  Phone: 5- 589 - [phone]  Web Address: www [employer]  Weight Management   Why it is important to manage your weight:  Being overweight increases your risk of health conditions such as heart disease, high blood pressure, type 2 diabetes, and certain types of cancer  It can also increase your risk for osteoarthritis, sleep apnea, and other respiratory problems  Aim for a slow, steady weight loss  Even a small amount of weight loss can lower your risk of health problems  How to lose weight safely:  A safe and healthy way to lose weight is to eat fewer calories and get regular exercise   You can lose up about 1 pound a week by decreasing the number of calories you eat by 500 calories each day  Healthy meal plan for weight management:  A healthy meal plan includes a variety of foods, contains fewer calories, and helps you stay healthy  A healthy meal plan includes the following:  · Eat whole-grain foods more often  A healthy meal plan should contain fiber  Fiber is the part of grains, fruits, and vegetables that is not broken down by your body  Whole-grain foods are healthy and provide extra fiber in your diet  Some examples of whole-grain foods are whole-wheat breads and pastas, oatmeal, brown rice, and bulgur  · Eat a variety of vegetables every day  Include dark, leafy greens such as spinach, kale, ashanti greens, and mustard greens  Eat yellow and orange vegetables such as carrots, sweet potatoes, and winter squash  · Eat a variety of fruits every day  Choose fresh or canned fruit (canned in its own juice or light syrup) instead of juice  Fruit juice has very little or no fiber  · Eat low-fat dairy foods  Drink fat-free (skim) milk or 1% milk  Eat fat-free yogurt and low-fat cottage cheese  Try low-fat cheeses such as mozzarella and other reduced-fat cheeses  · Choose meat and other protein foods that are low in fat  Choose beans or other legumes such as split peas or lentils  Choose fish, skinless poultry (chicken or turkey), or lean cuts of red meat (beef or pork)  Before you cook meat or poultry, cut off any visible fat  · Use less fat and oil  Try baking foods instead of frying them  Add less fat, such as margarine, sour cream, regular salad dressing and mayonnaise to foods  Eat fewer high-fat foods  Some examples of high-fat foods include french fries, doughnuts, ice cream, and cakes  · Eat fewer sweets  Limit foods and drinks that are high in sugar  This includes candy, cookies, regular soda, and sweetened drinks  Exercise:  Exercise at least 30 minutes per day on most days of the week  Some examples of exercise include walking, biking, dancing, and swimming  You can also fit in more physical activity by taking the stairs instead of the elevator or parking farther away from stores  Ask your healthcare provider about the best exercise plan for you  © Copyright [employer] 2018 Information is for End User's use only and may not be sold, redistributed or otherwise used for commercial purposes   All illustrations and images included in CareNotes® are the copyrighted property of A D A M , Inc  or [address] [place] [address]

## 2021-04-27 NOTE — PROGRESS NOTES
Assessment and Plan:     Problem List Items Addressed This Visit        Endocrine    Type 2 diabetes mellitus without complication, without long-term current use of insulin ([address] ) - Primary       Respiratory    Multifocal pneumonia       Cardiovascular and Mediastinum    Mediastinal lymphadenopathy       Other    Tobacco abuse    Hypokalemia    Class 1 obesity due to excess calories without serious comorbidity in adult      Other Visit Diagnoses     Encounter for screening mammogram for malignant neoplasm of breast               Preventive health issues were discussed with patient, and age appropriate screening tests were ordered as noted in patient's After Visit Summary  Personalized health advice and appropriate referrals for health education or preventive services given if needed, as noted in patient's After Visit Summary  History of Present Illness:     Patient presents for Medicare Annual Wellness visit    Patient Care Team:  Elyssa Mobley DO as PCP - General (Family Medicine)     Problem List:     Patient Active Problem List   Diagnosis    Schizoaffective disorder ([address] )    Tobacco abuse    Multifocal pneumonia    Hallucination    Hypokalemia    Type 2 diabetes mellitus without complication, without long-term current use of insulin (HCC)    Mediastinal lymphadenopathy    Upper airway cough syndrome    Class 1 obesity due to excess calories without serious comorbidity in adult      Past Medical and Surgical History:     Past Medical History:   Diagnosis Date    Acute hypoxemic respiratory failure ([address] ) 3/27/2021    Schizoaffective disorder [address] [address] [facility] )      History reviewed  No pertinent surgical history  Family History:     History reviewed  No pertinent family history     Social History:     E-Cigarette/Vaping    E-Cigarette Use Never User      E-Cigarette/Vaping Substances    Nicotine No     THC No     CBD No     Flavoring No     Other No     Unknown No      Social History Socioeconomic History    Marital status: Single     Spouse name: None    Number of children: None    Years of education: None    Highest education level: None   Occupational History    None   Social Needs    Financial resource strain: None    Food insecurity     Worry: None     Inability: None    Transportation needs     Medical: None     Non-medical: None   Tobacco Use    Smoking status: Current Every Day Smoker     Packs/day: 1 00     Years: 35 00     Pack years: 35 00    Smokeless tobacco: Never Used    Tobacco comment: Pt states 18 cigarettes per day   Substance and Sexual Activity    Alcohol use: Never     Frequency: Never     Binge frequency: Never    Drug use: No    Sexual activity: Not Currently   Lifestyle    Physical activity     Days per week: None     Minutes per session: None    Stress: None   Relationships    Social connections     Talks on phone: None     Gets together: None     Attends [employer] service: None     Active member of club or organization: None     Attends meetings of clubs or organizations: None     Relationship status: None    Intimate partner violence     Fear of current or ex partner: None     Emotionally abused: None     Physically abused: None     Forced sexual activity: None   Other Topics Concern    None   Social History Narrative    None      Medications and Allergies:     Current Outpatient Medications   Medication Sig Dispense Refill    INVEGA SUSTENNA 156 MG/ML IM injection Inject 156 mg into a muscle every 30 (thirty) days       nicotine polacrilex (NICORETTE) 4 mg gum Chew 1 each (4 mg total) as needed for smoking cessation (Patient not taking: Reported on 4/27/2021) 300 each 0     No current facility-administered medications for this visit  Allergies   Allergen Reactions    Risperidone Shortness Of Breath     Previously tolerated [place] and Invega tablets without a problem      Bacitracin     Effexor [Venlafaxine] Other (See Comments)     Causes elevated glucose    Haloperidol Other (See Comments)     Tardive Dyskinesia      Lexapro [Escitalopram] Other (See Comments)     hyperpigmentation    Zinc     Zyprexa [Olanzapine] Other (See Comments)     Causes elevated glucose      Immunizations:     Immunization History   Administered Date(s) Administered    HPV Quadrivalent 11/24/2010    Tdap 05/20/2015      Health Maintenance:         Topic Date Due    Cervical Cancer Screening  Never done    MAMMOGRAM  08/21/2015    HIV Screening  Completed    Hepatitis C Screening  Completed         Topic Date Due    Pneumococcal Vaccine: Pediatrics (0 to 5 Years) and At-Risk Patients (6 to 59 Years) (1 of 1 - PPSV23) Never done    COVID-19 Vaccine (1) Never done      Medicare Health Risk Assessment:     /78 (BP Location: Left arm, Patient Position: Sitting, Cuff Size: Standard)   Pulse (!) 122   Temp 98 1 °F (36 7 °C) (Temporal)   Resp 17   Ht 5' 3 5" (1 613 m)   Wt 83 kg (183 lb)   SpO2 95%   BMI 31 91 kg/m²      Ara Butts is here for her Subsequent Wellness visit  Health Risk Assessment:   Patient rates overall health as good  Patient feels that their physical health rating is same  Patient is very satisfied with their life  Eyesight was rated as same  Hearing was rated as same  Patient feels that their emotional and mental health rating is same  Patients states they are never, rarely angry  Patient states they are never, rarely unusually tired/fatigued  Pain experienced in the last 7 days has been none  Patient states that she has experienced no weight loss or gain in last 6 months  Depression Screening:   PHQ-2 Score: 0      Fall Risk Screening: In the past year, patient has experienced: no history of falling in past year      Urinary Incontinence Screening:   Patient has leaked urine accidently in the last six months   Patient to try Kegel exercises     Home Safety:  Patient does not have trouble with stairs inside or outside of their home  Patient has working smoke alarms and has working carbon monoxide detector  Home safety hazards include: none  Nutrition:   Current diet is Other (please comment) and Diabetic  Vegetarian; will order B12 with next labs    Medications:   Patient is currently taking over-the-counter supplements  OTC medications include: see medication list  Patient is able to manage medications  Activities of Daily Living (ADLs)/Instrumental Activities of Daily Living (IADLs):   Walk and transfer into and out of bed and chair?: Yes  Dress and groom yourself?: Yes    Bathe or shower yourself?: Yes    Feed yourself?  Yes  Do your laundry/housekeeping?: Yes  Manage your money, pay your bills and track your expenses?: Yes  Make your own meals?: Yes    Do your own shopping?: Yes    Previous Hospitalizations:   Any hospitalizations or ED visits within the last 12 months?: Yes    How many hospitalizations have you had in the last year?: 1-2    Advance Care Planning:   Living will: No    Advanced directive: No      Cognitive Screening:   Provider or family/friend/caregiver concerned regarding cognition?: No    PREVENTIVE SCREENINGS      Cardiovascular Screening:    General: Screening Current      Diabetes Screening:     General: Screening Not Indicated and History Diabetes      Colorectal Cancer Screening:     General: Screening Not Indicated      Breast Cancer Screening:     General: Risks and Benefits Discussed    Due for: Mammogram        Cervical Cancer Screening:    General: Risks and Benefits Discussed    Due for: Cervical Pap Smear      Abdominal Aortic Aneurysm (AAA) Screening:        General: Screening Not Indicated      Lung Cancer Screening:     General: Screening Not Indicated      Hepatitis C Screening:    General: Screening Current    Screening, Brief Intervention, and Referral to Treatment (SBIRT)    Screening      Single Item Drug Screening:  How often have you used an illegal drug (including marijuana) or a prescription medication for non-medical reasons in the past year? never    Single Item Drug Screen Score: 0  Interpretation: Negative screen for possible drug use disorder      Audrey Theodore, DO

## 2021-04-28 DIAGNOSIS — B37.3 CANDIDIASIS OF VAGINA: ICD-10-CM

## 2021-04-28 RX ORDER — FLUCONAZOLE 100 MG/1
100 TABLET ORAL DAILY
Qty: 1 TABLET | Refills: 1 | OUTPATIENT
Start: 2021-04-28 | End: 2021-04-29

## 2021-05-06 DIAGNOSIS — B37.3 CANDIDIASIS OF VAGINA: ICD-10-CM

## 2021-05-06 RX ORDER — FLUCONAZOLE 100 MG/1
100 TABLET ORAL DAILY
Qty: 1 TABLET | Refills: 1 | Status: SHIPPED | OUTPATIENT
Start: 2021-05-06 | End: 2021-05-07

## 2021-08-06 DIAGNOSIS — B37.3 CANDIDIASIS OF VAGINA: ICD-10-CM

## 2021-08-06 RX ORDER — FLUCONAZOLE 100 MG/1
100 TABLET ORAL DAILY
Qty: 1 TABLET | Refills: 1 | OUTPATIENT
Start: 2021-08-06 | End: 2021-08-07

## 2021-08-06 NOTE — TELEPHONE ENCOUNTER
Spoke to pt who stated she does not need this medication she has been receiving monistat from her insurance  She will call and schedule an appt if she needs to later    [facility]

## 2021-08-06 NOTE — TELEPHONE ENCOUNTER
Looks like this is the 4th request for diflucan since her last OV     recommend appt for exam and culture

## 2021-09-22 DIAGNOSIS — J34.89 RHINORRHEA: ICD-10-CM

## 2021-09-23 RX ORDER — CETIRIZINE HYDROCHLORIDE 10 MG/1
10 TABLET ORAL DAILY
Qty: 30 TABLET | Refills: 5 | Status: SHIPPED | OUTPATIENT
Start: 2021-09-23

## 2021-11-29 DIAGNOSIS — J34.89 RHINORRHEA: ICD-10-CM

## 2021-11-29 RX ORDER — FLUTICASONE PROPIONATE 50 MCG
1 SPRAY, SUSPENSION (ML) NASAL DAILY
Qty: 16 G | Refills: 0 | Status: SHIPPED | OUTPATIENT
Start: 2021-11-29

## 2022-03-03 ENCOUNTER — TELEPHONE (OUTPATIENT)
Dept: FAMILY MEDICINE CLINIC | Facility: CLINIC | Age: 47
End: 2022-03-03

## 2022-05-02 ENCOUNTER — TELEPHONE (OUTPATIENT)
Dept: FAMILY MEDICINE CLINIC | Facility: CLINIC | Age: 47
End: 2022-05-02

## 2022-05-02 NOTE — TELEPHONE ENCOUNTER
[facility] patient  She was seen last in April of 2021  She did not get labs done as ordered  Please call patient to remind her that she is overdue for appt and labs  Thanks

## 2022-05-03 NOTE — TELEPHONE ENCOUNTER
Per pt/pt mom, they would not like anything done on their behalf-per pt "Arthuro  moved away and we lost the relationship do not call again"  Offered to send information to [address] [place] [address] Drive for an appt to be made-patient declined

## 2022-05-05 NOTE — CASE MANAGEMENT
Treatment team met with Pt to discuss goals and treatment planning  Pt was engaged, although she rambles off topic and stated more than once she did not wish to work with white people  She signed Tx Plan  HEATHER offered copy of treatment plan, Pt preferred to keep Tx plan in personal unit folder  HEATHER placed in Pt folder on unit  NULL

## 2022-06-19 NOTE — TELEPHONE ENCOUNTER
06/18/22 11:43 PM     Thank you for your request  Your request has been received, reviewed, and the patient chart updated  The PCP has successfully been removed with a patient attribution note  Please remind staff to not remove PCP at office level for this scenario; [facility] Department will remove  This message will now be completed      Thank you  Edith Shone

## 2022-10-12 PROBLEM — J18.9 MULTIFOCAL PNEUMONIA: Status: RESOLVED | Noted: 2021-03-27 | Resolved: 2022-10-12

## 2024-01-22 ENCOUNTER — HOSPITAL ENCOUNTER (EMERGENCY)
Facility: HOSPITAL | Age: 49
End: 2024-01-23
Attending: EMERGENCY MEDICINE | Admitting: EMERGENCY MEDICINE
Payer: MEDICARE

## 2024-01-22 DIAGNOSIS — R45.1 AGITATION: ICD-10-CM

## 2024-01-22 DIAGNOSIS — Z86.59 HISTORY OF SCHIZOAFFECTIVE DISORDER: ICD-10-CM

## 2024-01-22 DIAGNOSIS — F29 PSYCHOSIS (HCC): Primary | ICD-10-CM

## 2024-01-22 PROCEDURE — 84703 CHORIONIC GONADOTROPIN ASSAY: CPT

## 2024-01-22 PROCEDURE — 93005 ELECTROCARDIOGRAM TRACING: CPT

## 2024-01-22 PROCEDURE — 84443 ASSAY THYROID STIM HORMONE: CPT | Performed by: EMERGENCY MEDICINE

## 2024-01-22 PROCEDURE — 80053 COMPREHEN METABOLIC PANEL: CPT | Performed by: EMERGENCY MEDICINE

## 2024-01-22 PROCEDURE — 99285 EMERGENCY DEPT VISIT HI MDM: CPT | Performed by: EMERGENCY MEDICINE

## 2024-01-22 PROCEDURE — 81025 URINE PREGNANCY TEST: CPT | Performed by: EMERGENCY MEDICINE

## 2024-01-22 PROCEDURE — 96372 THER/PROPH/DIAG INJ SC/IM: CPT

## 2024-01-22 PROCEDURE — 85025 COMPLETE CBC W/AUTO DIFF WBC: CPT | Performed by: EMERGENCY MEDICINE

## 2024-01-22 PROCEDURE — 36415 COLL VENOUS BLD VENIPUNCTURE: CPT | Performed by: EMERGENCY MEDICINE

## 2024-01-22 PROCEDURE — 99285 EMERGENCY DEPT VISIT HI MDM: CPT

## 2024-01-22 RX ORDER — BENZTROPINE MESYLATE 1 MG/ML
1 INJECTION INTRAMUSCULAR; INTRAVENOUS ONCE
Status: COMPLETED | OUTPATIENT
Start: 2024-01-22 | End: 2024-01-22

## 2024-01-22 RX ORDER — LORAZEPAM 2 MG/ML
2 INJECTION INTRAMUSCULAR ONCE
Status: COMPLETED | OUTPATIENT
Start: 2024-01-22 | End: 2024-01-22

## 2024-01-22 RX ORDER — HALOPERIDOL 5 MG/ML
5 INJECTION INTRAMUSCULAR ONCE
Status: COMPLETED | OUTPATIENT
Start: 2024-01-22 | End: 2024-01-22

## 2024-01-22 RX ORDER — LORAZEPAM 2 MG/ML
2 INJECTION INTRAMUSCULAR ONCE
Status: DISCONTINUED | OUTPATIENT
Start: 2024-01-22 | End: 2024-01-22

## 2024-01-22 RX ADMIN — LORAZEPAM 2 MG: 2 INJECTION INTRAMUSCULAR; INTRAVENOUS at 22:56

## 2024-01-22 RX ADMIN — BENZTROPINE MESYLATE 1 MG: 1 INJECTION INTRAMUSCULAR; INTRAVENOUS at 22:53

## 2024-01-22 RX ADMIN — HALOPERIDOL LACTATE 5 MG: 5 INJECTION, SOLUTION INTRAMUSCULAR at 22:53

## 2024-01-22 NOTE — LETTER
[facility] EMERGENCY DEPARTMENT  [address]  [place] [address]  Dept: [phone]      EMTALA TRANSFER CONSENT    NAME Vero Quijano                                         1975                              MRN [id number]    I have been informed of my rights regarding examination, treatment, and transfer   by Dr. Hieu Obregon DO    Benefits: Continuity of care, Other benefits (Include comment)_______________________, Specialized equipment and/or services available at the receiving facility (Include comment)________________________ (inpatient mental health 302)    Risks: Increased discomfort during transfer, Potential for delay in receiving treatment, Potential deterioration of medical condition, Possible worsening of condition or death during transfer      Consent for Transfer:  I acknowledge that my medical condition has been evaluated and explained to me by the emergency department physician or other qualified medical person and/or my attending physician, who has recommended that I be transferred to the service of  Accepting Physician: Dr. Rodriguez at Accepting Facility Name, City & State : [facility]. The above potential benefits of such transfer, the potential risks associated with such transfer, and the probable risks of not being transferred have been explained to me, and I fully understand them.  The doctor has explained that, in my case, the benefits of transfer outweigh the risks.  I agree to be transferred.      I authorize the performance of emergency medical procedures and treatments upon me in both transit and upon arrival at the receiving facility.  Additionally, I authorize the release of any and all medical records to the receiving facility and request they be transported with me, if possible.  I understand that the safest mode of transportation during a medical emergency is an ambulance and that the Hospital advocates the use of this mode of  transport. Risks of traveling to the receiving facility by car, including absence of medical control, life sustaining equipment, such as oxygen, and medical personnel has been explained to me and I fully understand them.      (GOPI CORRECT BOX BELOW)  [  ]  I consent to the stated transfer and to be transported by ambulance/helicopter.  [  ]  I consent to the stated transfer, but refuse transportation by ambulance and accept full responsibility for my transportation by car.  I understand the risks of non-ambulance transfers and I exonerate the Hospital and its staff from any deterioration in my condition that results from this refusal.    X___________________________________________    DATE  24  TIME________  Signature of patient or legally responsible individual signing on patient behalf           RELATIONSHIP TO PATIENT_________________________          Provider Certification    NAME Vero Quijano                                         1975                              MRN [id number]    A medical screening exam was performed on the above named patient.  Based on the examination:    Condition Necessitating Transfer There were no encounter diagnoses.    Patient Condition: The patient has been stabilized such that within reasonable medical probability, no material deterioration of the patient condition or the condition of the unborn child(juan) is likely to result from the transfer    Reason for Transfer: Level of Care needed not available at this facility, No bed available at level of patient's needs, Other (Include comment)____________________ (inpatient mental health 302)    Transfer Requirements: Facility [facility]   Space available and qualified personnel available for treatment as acknowledged by Crisis  Agreed to accept transfer and to provide appropriate medical treatment as acknowledged by       Dr. Rodriguez  Appropriate medical records of the examination and treatment of the  patient are provided at the time of transfer   STAFF INITIAL WHEN COMPLETED _______  Transfer will be performed by qualified personnel from    and appropriate transfer equipment as required, including the use of necessary and appropriate life support measures.    Provider Certification: I have examined the patient and explained the following risks and benefits of being transferred/refusing transfer to the patient/family:  Unanticipated needs of medical equipment and personnel during transport, General risk, such as traffic hazards, adverse weather conditions, rough terrain or turbulence, possible failure of equipment (including vehicle or aircraft), or consequences of actions of persons outside the control of the transport personnel, Risk of worsening condition, The possibility of a transport vehicle being unavailable, Consent was not obtained as patient is committed to psychiatric facility and transfer is mandated, The patient is stable for psychiatric transfer because they are medically stable, and is protected from harming him/herself or others during transport      Based on these reasonable risks and benefits to the patient and/or the unborn child(juan), and based upon the information available at the time of the patient’s examination, I certify that the medical benefits reasonably to be expected from the provision of appropriate medical treatments at another medical facility outweigh the increasing risks, if any, to the individual’s medical condition, and in the case of labor to the unborn child, from effecting the transfer.    X____________________________________________ DATE 01/23/24        TIME_______      ORIGINAL - SEND TO MEDICAL RECORDS   COPY - SEND WITH PATIENT DURING TRANSFER

## 2024-01-22 NOTE — LETTER
"Section I - General Information    Name of Patient: Vero Quijano                 : 1975    Medicare #:____________________  Transport Date: 24 (PCS is valid for round trips on this date and for all repetitive trips in the 60-day range as noted below.)  Origin: [facility] EMERGENCY DEPARTMENT                                                         Destination:________________________________________________  Is the pt's stay covered under Medicare Part A (PPS/DRG)     (_) YES  (_) NO  Closest appropriate facility?  (_) YES  (_) NO  If no, why is transport to more distant facility required?________________________  If hosp-hosp transfer, describe services needed at 2nd facility not available at 1st facility? _________________________________  If hospice pt, is this transport related to pt's terminal illness? (_) YES (_) NO Describe____________________________________    Section II - Medical Necessity Questionnaire  Ambulance transportation is medically necessary only if other means of transport are contraindicated or would be potentially harmful to the patient. To meet this requirement, the patient must either be \"bed confined\" or suffer from a condition such that transport by means other than ambulance is contraindicated by the patient's condition. The following questions must be answered by the medical professional signing below for this form to be valid:    1)  Describe the MEDICAL CONDITION (physical and/or mental) of this patient AT THE TIME OF AMBULANCE TRANSPORT that requires the patient to be transported in an ambulance and why transport by other means is contraindicated by the patient's condition:__________________________________________________________________________________________________    2) Is the patient \"bed confined\" as defined below?     (_) YES  (_) NO  To be \"be confined\" the patient must satisfy all three of the following conditions: (1) unable to get up from " bed without Assistance; AND (2) unable to ambulate; AND (3) unable to sit in a chair or wheelchair.    3) Can this patient safely be transported by car or wheelchair van (i.e., seated during transport without a medical attendant or monitoring)?   (_) YES  (_) NO    4) In addition to completing questions 1-3 above, please check any of the following conditions that apply*:  *Note: supporting documentation for any boxes checked must be maintained in the patient's medical records  (_)Contractures   (_)Non-Healed Fractures  (_)Patient is confused (_)Patient is comatose (_)Moderate/severe pain on movement (_)Danger to self/others  (_)IV meds/fluids required (_)Patient is combative(_)Need or possible need for restraints (_)DVT requires elevation of lower extremity  (_)Medical attendant required (_)Requires oxygen-unable to self administer (_)Special handling/isolation/infection control precautions required (_)Unable to tolerate seated position for time needed to transport (_)Hemodynamic monitoring required en route (_)Unable to sit in a chair or wheelchair due to decubitus ulcers or other wounds (_)Cardiac monitoring required en route (_)Morbid obesity requires additional personnel/equipment to safely handle patient (_)Orthopedic device (backboard, halo, pins, traction, brace, wedge, etc,) requiring special handling during transport (_)Other(specify)_______________________________________________    Section III - Signature of Physician or Healthcare Professional    I certify that the above information is accurate based on my evaluation of this patient, and that the medical necessity provisions of 42 .40(e)(1) are met, requiring that this patient be transported by ambulance. I understand this information will be used by the Centers for Medicare and Medicaid Services (CMS) to support the determination of medical necessity for ambulance services. I represent that I am the beneficiary’s attending physician; or an  employee of the beneficiary’s attending physician, or the hospital or facility where the beneficiary is being treated and from which the beneficiary is being transported; that I have personal knowledge of the beneficiary’s condition at the time of transport; and that I meet all Medicare regulations and applicable State licensure laws for the credential indicated.     (_) If this box is checked, I also certify that the patient is physically or mentally incapable of signing the ambulance service's claim and that the institution with which I am affiliated has furnished care, services, or assistance to the patient.  My signature below is made on behalf of the patient pursuant to 42 CFR §424.36(b)(4). In accordance with 42 CFR §424.37, the specific reason(s) that the patient is physically or mentally incapable of signing the claim form is as follows: _________________________________________________________________________________________________________      Signature of Physician* or Healthcare Professional______________________________________________________________  Signature Date 01/23/24 (For scheduled repetitive transports, this form is not valid for transports performed more than 60 days after this date)    Printed Name & Credentials of Physician or Healthcare Professional (MD, DO, RN, etc.)________________________________  *Form must be signed by patient's attending physician for scheduled, repetitive transports. For non-repetitive, unscheduled ambulance transports, if unable to obtain the signature of the attending physician, any of the following may sign (choose appropriate option below)  (_) Physician Assistant   (_)  Clinical Nurse Specialist    (_)  Licensed Practical Nurse    (_)    (_)  Nurse Practitioner     (_)  Registered Nurse                (_)                         (_) Discharge Planner

## 2024-01-23 VITALS
HEART RATE: 107 BPM | DIASTOLIC BLOOD PRESSURE: 71 MMHG | SYSTOLIC BLOOD PRESSURE: 142 MMHG | TEMPERATURE: 99.4 F | RESPIRATION RATE: 20 BRPM | WEIGHT: 231.7 LBS | OXYGEN SATURATION: 95 % | BODY MASS INDEX: 40.4 KG/M2

## 2024-01-23 LAB
ALBUMIN SERPL BCP-MCNC: 4.3 G/DL (ref 3.5–5)
ALP SERPL-CCNC: 71 U/L (ref 34–104)
ALT SERPL W P-5'-P-CCNC: 8 U/L (ref 7–52)
AMPHETAMINES SERPL QL SCN: NEGATIVE
ANION GAP SERPL CALCULATED.3IONS-SCNC: 7 MMOL/L
AST SERPL W P-5'-P-CCNC: 12 U/L (ref 13–39)
BARBITURATES UR QL: NEGATIVE
BASOPHILS # BLD AUTO: 0.06 THOUSANDS/ÂΜL (ref 0–0.1)
BASOPHILS NFR BLD AUTO: 0 % (ref 0–1)
BENZODIAZ UR QL: NEGATIVE
BILIRUB SERPL-MCNC: 0.4 MG/DL (ref 0.2–1)
BUN SERPL-MCNC: 7 MG/DL (ref 5–25)
CALCIUM SERPL-MCNC: 9 MG/DL (ref 8.4–10.2)
CHLORIDE SERPL-SCNC: 105 MMOL/L (ref 96–108)
CO2 SERPL-SCNC: 22 MMOL/L (ref 21–32)
COCAINE UR QL: NEGATIVE
CREAT SERPL-MCNC: 0.77 MG/DL (ref 0.6–1.3)
EOSINOPHIL # BLD AUTO: 0.06 THOUSAND/ÂΜL (ref 0–0.61)
EOSINOPHIL NFR BLD AUTO: 0 % (ref 0–6)
ERYTHROCYTE [DISTWIDTH] IN BLOOD BY AUTOMATED COUNT: 13.3 % (ref 11.6–15.1)
ETHANOL SERPL-MCNC: <10 MG/DL
EXT PREGNANCY TEST URINE: NEGATIVE
EXT. CONTROL: NORMAL
FLUAV RNA RESP QL NAA+PROBE: NEGATIVE
FLUBV RNA RESP QL NAA+PROBE: NEGATIVE
GFR SERPL CREATININE-BSD FRML MDRD: 90 ML/MIN/1.73SQ M
GLUCOSE SERPL-MCNC: 174 MG/DL (ref 65–140)
HCG SERPL QL: NEGATIVE
HCT VFR BLD AUTO: 47.2 % (ref 34.8–46.1)
HGB BLD-MCNC: 15.9 G/DL (ref 11.5–15.4)
IMM GRANULOCYTES # BLD AUTO: 0.08 THOUSAND/UL (ref 0–0.2)
IMM GRANULOCYTES NFR BLD AUTO: 1 % (ref 0–2)
LYMPHOCYTES # BLD AUTO: 1.73 THOUSANDS/ÂΜL (ref 0.6–4.47)
LYMPHOCYTES NFR BLD AUTO: 10 % (ref 14–44)
MCH RBC QN AUTO: 28.8 PG (ref 26.8–34.3)
MCHC RBC AUTO-ENTMCNC: 33.7 G/DL (ref 31.4–37.4)
MCV RBC AUTO: 85 FL (ref 82–98)
METHADONE UR QL: NEGATIVE
MONOCYTES # BLD AUTO: 0.66 THOUSAND/ÂΜL (ref 0.17–1.22)
MONOCYTES NFR BLD AUTO: 4 % (ref 4–12)
NEUTROPHILS # BLD AUTO: 14.36 THOUSANDS/ÂΜL (ref 1.85–7.62)
NEUTS SEG NFR BLD AUTO: 85 % (ref 43–75)
NRBC BLD AUTO-RTO: 0 /100 WBCS
OPIATES UR QL SCN: NEGATIVE
OXYCODONE+OXYMORPHONE UR QL SCN: NEGATIVE
PCP UR QL: NEGATIVE
PLATELET # BLD AUTO: 304 THOUSANDS/UL (ref 149–390)
PMV BLD AUTO: 9.4 FL (ref 8.9–12.7)
POTASSIUM SERPL-SCNC: 3.8 MMOL/L (ref 3.5–5.3)
PROT SERPL-MCNC: 7.9 G/DL (ref 6.4–8.4)
RBC # BLD AUTO: 5.53 MILLION/UL (ref 3.81–5.12)
RSV RNA RESP QL NAA+PROBE: NEGATIVE
SARS-COV-2 RNA RESP QL NAA+PROBE: NEGATIVE
SODIUM SERPL-SCNC: 134 MMOL/L (ref 135–147)
THC UR QL: NEGATIVE
TSH SERPL DL<=0.05 MIU/L-ACNC: 1.56 UIU/ML (ref 0.45–4.5)
WBC # BLD AUTO: 16.95 THOUSAND/UL (ref 4.31–10.16)

## 2024-01-23 PROCEDURE — 80307 DRUG TEST PRSMV CHEM ANLYZR: CPT | Performed by: EMERGENCY MEDICINE

## 2024-01-23 PROCEDURE — 82077 ASSAY SPEC XCP UR&BREATH IA: CPT | Performed by: EMERGENCY MEDICINE

## 2024-01-23 PROCEDURE — 36415 COLL VENOUS BLD VENIPUNCTURE: CPT | Performed by: EMERGENCY MEDICINE

## 2024-01-23 PROCEDURE — 0241U HB NFCT DS VIR RESP RNA 4 TRGT: CPT | Performed by: EMERGENCY MEDICINE

## 2024-01-23 NOTE — RESTRAINT FACE TO FACE
Restraint Face to Face   Verocarlotta Quijano 48 y.o. female MRN: [id number]  Unit/Bed#: ED-03 Encounter: [id number]      Physical Evaluation patient resting comfortably in bed  Purpose for Restraints/ Seclusion High risk for self harm, High risk for causing significant disruption of treatment environment , and High risk for harm to others  Patient's reaction to the intervention successfully sedated  Patient's medical condition stable  Patient's Behavioral condition resting comfortably  Restraints to be: Will start by taking off 2/4 restraints and reassess

## 2024-01-23 NOTE — ED NOTES
Transfer packet complete with copies, on chart.  Still awaiting BLS transport time.  Notified [facility] of same.  Based on last set of vitals, they also requested a COVID test to rule out same.  Labs order placed.  Crisis to follow-up with [facility] with COVID results and transport time ASAP.

## 2024-01-23 NOTE — EMTALA/ACUTE CARE TRANSFER
[facility] EMERGENCY DEPARTMENT  [address] [address]  Dept: [phone]      EMTALA TRANSFER CONSENT    NAME Vero Quijano                                         1975                              MRN [id number]    I have been informed of my rights regarding examination, treatment, and transfer   by Dr. Anika Berry MD    Benefits: Specialized equipment and/or services available at the receiving facility (Include comment)________________________ (Behavioral/psychiatric care)    Risks: Potential for delay in receiving treatment      Consent for Transfer:  I acknowledge that my medical condition has been evaluated and explained to me by the emergency department physician or other qualified medical person and/or my attending physician, who has recommended that I be transferred to the service of  Accepting Physician: Dr. Kerry Moore at Accepting Facility Name, City & State : [facility] [place] [address]. The above potential benefits of such transfer, the potential risks associated with such transfer, and the probable risks of not being transferred have been explained to me, and I fully understand them.  The doctor has explained that, in my case, the benefits of transfer outweigh the risks.  I agree to be transferred.    I authorize the performance of emergency medical procedures and treatments upon me in both transit and upon arrival at the receiving facility.  Additionally, I authorize the release of any and all medical records to the receiving facility and request they be transported with me, if possible.  I understand that the safest mode of transportation during a medical emergency is an ambulance and that the Hospital advocates the use of this mode of transport. Risks of traveling to the receiving facility by car, including absence of medical control, life sustaining equipment, such as oxygen, and medical personnel has been explained to me and I  fully understand them.    (GOPI CORRECT BOX BELOW)  [  ]  I consent to the stated transfer and to be transported by ambulance/helicopter.  [  ]  I consent to the stated transfer, but refuse transportation by ambulance and accept full responsibility for my transportation by car.  I understand the risks of non-ambulance transfers and I exonerate the Hospital and its staff from any deterioration in my condition that results from this refusal.    X___________________________________________    DATE  24  TIME________  Signature of patient or legally responsible individual signing on patient behalf           RELATIONSHIP TO PATIENT_________________________          Provider Certification    NAME Vero Quijano                                         1975                              MRN [id number]    A medical screening exam was performed on the above named patient.  Based on the examination:    Condition Necessitating Transfer The primary encounter diagnosis was Psychosis (HCC). Diagnoses of Agitation and History of schizoaffective disorder were also pertinent to this visit.    Patient Condition: The patient has been stabilized such that within reasonable medical probability, no material deterioration of the patient condition or the condition of the unborn child(juan) is likely to result from the transfer    Reason for Transfer: Level of Care needed not available at this facility    Transfer Requirements: [facility]; [place] [address]   Space available and qualified personnel available for treatment as acknowledged by Crisis  Agreed to accept transfer and to provide appropriate medical treatment as acknowledged by       Dr. Kerry Moore  Appropriate medical records of the examination and treatment of the patient are provided at the time of transfer   STAFF INITIAL WHEN COMPLETED _______  Transfer will be performed by qualified personnel from    and appropriate transfer equipment as  required, including the use of necessary and appropriate life support measures.    Provider Certification: I have examined the patient and explained the following risks and benefits of being transferred/refusing transfer to the patient/family:  General risk, such as traffic hazards, adverse weather conditions, rough terrain or turbulence, possible failure of equipment (including vehicle or aircraft), or consequences of actions of persons outside the control of the transport personnel      Based on these reasonable risks and benefits to the patient and/or the unborn child(juan), and based upon the information available at the time of the patient’s examination, I certify that the medical benefits reasonably to be expected from the provision of appropriate medical treatments at another medical facility outweigh the increasing risks, if any, to the individual’s medical condition, and in the case of labor to the unborn child, from effecting the transfer.    X____________________________________________ DATE 01/23/24        TIME_______      ORIGINAL - SEND TO MEDICAL RECORDS   COPY - SEND WITH PATIENT DURING TRANSFER

## 2024-01-23 NOTE — ED NOTES
Call received from Stephen, [place] at [facility].  She reported that they had some COVID+ cases and had to close a unit and can NO LONGER OFFER THE PATIENT A BED TODAY.  They can hold a bed for discharge tomorrow, however.  They still request to be notified with COVID results and request communication should an alternate bed at another facility become available.  Crisis may follow-up with facilities that were previously willing to review (all were notified to disregard after [facility] accepted.  Crisis will need to follow-up with [facility] with disposition and with [facility] [employer] pending ultimate disposition.      Transfer to [facility] postponed pending bed search for alternate facilities.  If needed, [facility] can consider tomorrow for discharge bed.

## 2024-01-23 NOTE — ED CARE HANDOFF
Emergency Department Sign Out Note        Sign out and transfer of care from Dr. Blanca. See Separate Emergency Department note.     The patient, Vero Quijano, was evaluated by the previous provider for Behavioral Health.    Workup Completed:  Temp:  [99.7 °F (37.6 °C)] 99.7 °F (37.6 °C)  HR:  [] 98  Resp:  [20-22] 22  BP: (120-202)/(66-95) 125/69  Recent Results (from the past 12 hour(s))   ECG 12 lead    Collection Time: 01/22/24 11:53 PM   Result Value Ref Range    Ventricular Rate 95 BPM    Atrial Rate 95 BPM    MS Interval 150 ms    QRSD Interval 74 ms    QT Interval 362 ms    QTC Interval 454 ms    P Axis 68 degrees    QRS Axis 92 degrees    T Wave Axis 47 degrees   CBC and differential    Collection Time: 01/22/24 11:56 PM   Result Value Ref Range    WBC 16.95 (H) 4.31 - 10.16 Thousand/uL    RBC 5.53 (H) 3.81 - 5.12 Million/uL    Hemoglobin 15.9 (H) 11.5 - 15.4 g/dL    Hematocrit 47.2 (H) 34.8 - 46.1 %    MCV 85 82 - 98 fL    MCH 28.8 26.8 - 34.3 pg    MCHC 33.7 31.4 - 37.4 g/dL    RDW 13.3 11.6 - 15.1 %    MPV 9.4 8.9 - 12.7 fL    Platelets 304 149 - 390 Thousands/uL    nRBC 0 /100 WBCs    Neutrophils Relative 85 (H) 43 - 75 %    Immat GRANS % 1 0 - 2 %    Lymphocytes Relative 10 (L) 14 - 44 %    Monocytes Relative 4 4 - 12 %    Eosinophils Relative 0 0 - 6 %    Basophils Relative 0 0 - 1 %    Neutrophils Absolute 14.36 (H) 1.85 - 7.62 Thousands/µL    Immature Grans Absolute 0.08 0.00 - 0.20 Thousand/uL    Lymphocytes Absolute 1.73 0.60 - 4.47 Thousands/µL    Monocytes Absolute 0.66 0.17 - 1.22 Thousand/µL    Eosinophils Absolute 0.06 0.00 - 0.61 Thousand/µL    Basophils Absolute 0.06 0.00 - 0.10 Thousands/µL   Comprehensive metabolic panel    Collection Time: 01/22/24 11:56 PM   Result Value Ref Range    Sodium 134 (L) 135 - 147 mmol/L    Potassium 3.8 3.5 - 5.3 mmol/L    Chloride 105 96 - 108 mmol/L    CO2 22 21 - 32 mmol/L    ANION GAP 7 mmol/L    BUN 7 5 - 25 mg/dL    Creatinine 0.77 0.60 - 1.30  mg/dL    Glucose 174 (H) 65 - 140 mg/dL    Calcium 9.0 8.4 - 10.2 mg/dL    AST 12 (L) 13 - 39 U/L    ALT 8 7 - 52 U/L    Alkaline Phosphatase 71 34 - 104 U/L    Total Protein 7.9 6.4 - 8.4 g/dL    Albumin 4.3 3.5 - 5.0 g/dL    Total Bilirubin 0.40 0.20 - 1.00 mg/dL    eGFR 90 ml/min/1.73sq m   TSH, 3rd generation with Free T4 reflex    Collection Time: 01/22/24 11:56 PM   Result Value Ref Range    TSH 3RD GENERATON 1.556 0.450 - 4.500 uIU/mL   hCG, qualitative pregnancy    Collection Time: 01/22/24 11:56 PM   Result Value Ref Range    Preg, Serum Negative Negative   Rapid drug screen, urine    Collection Time: 01/23/24  2:49 AM   Result Value Ref Range    Amph/Meth UR Negative Negative    Barbiturate Ur Negative Negative    Benzodiazepine Urine Negative Negative    Cocaine Urine Negative Negative    Methadone Urine Negative Negative    Opiate Urine Negative Negative    PCP Ur Negative Negative    THC Urine Negative Negative    Oxycodone Urine Negative Negative   POCT pregnancy, urine    Collection Time: 01/23/24  2:53 AM   Result Value Ref Range    EXT Preg Test, Ur Negative     Control Valid    Ethanol    Collection Time: 01/23/24  3:11 AM   Result Value Ref Range    Ethanol Lvl <10 <10 mg/dL         Given:    Medication Administration - last 24 hours from 01/22/2024 0647 to 01/23/2024 0647         Date/Time Order Dose Route Action Action by     01/22/2024 2253 EST haloperidol lactate (HALDOL) injection 5 mg 5 mg Intramuscular Given Supriya Murry RN     01/22/2024 2256 EST LORazepam (ATIVAN) injection 2 mg -- Intravenous Canceled Entry Supriya Murry RN     01/22/2024 2253 EST benztropine (COGENTIN) injection 1 mg 1 mg Intramuscular Given Supriya Murry RN     01/22/2024 2256 EST LORazepam (ATIVAN) injection 2 mg 2 mg Intramuscular Given Supriya Murry RN              ED Course / Workup Pending (followup):  Patient was evaluated by crisis.  Patient is medically cleared at this time for admission and  psychiatric evaluation.                                  ED Course as of 01/23/24 0646   Tue Jan 23, 2024   0633 Sign out received.     Procedures  Medical Decision Making  Amount and/or Complexity of Data Reviewed  Labs: ordered.            Disposition  Final diagnoses:   None     ED Disposition       None          Follow-up Information    None       Patient's Medications   Discharge Prescriptions    No medications on file     No discharge procedures on file.       ED Provider  Electronically Signed by     Jj Ghotra MD  01/23/24 0966     Detail Level: Generalized Detail Level: Detailed

## 2024-01-23 NOTE — ED CARE HANDOFF
Emergency Department Sign Out Note        Sign out and transfer of care from [facility]. See Separate Emergency Department note.     The patient, Vero Quijano, was evaluated by the previous provider for acute psychosis.  Patient has long history of schizoaffective disorder.  Was at a group home until 8 months ago when she was pulled by mom.  Since then has been living at home and deteriorating despite Invega injections.  Has been having auditory and visual hallucinations.  Believes mom has been replaced by a replication.  Thinks that mom and others are out to kill her.  Increasing aggressive behavior.  911 called and eventually brought to ED.  Required Haldol, Ativan, Cogentin and locked restraints.    Workup Completed:  CBC, CMP, TSH    ED Course / Workup Pending (followup):  Two doctor 302 signed by previous team.  Patient currently resting comfortably after medications.  Locked restraints have been reduced.      Final update: Patient remained calm and asleep for entirety of shift.  Was able to reduce locked restraints.  Did not require any further medications during my shift.  Patient care signed out to Dr. Jj Ghotra with a plan to continue the two doctor 302, offer patient 201 when awake, further evaluation by ED crisis worker.      ED Course as of 01/24/24 0442   Tue Jan 23, 2024   0353 Medically cleared for psychiatric evaluation and placement     Procedures  Medical Decision Making  Amount and/or Complexity of Data Reviewed  Labs: ordered.    Risk  Decision regarding hospitalization.            Disposition  Final diagnoses:   Psychosis (HCC)   Agitation   History of schizoaffective disorder     Time reflects when diagnosis was documented in both MDM as applicable and the Disposition within this note       Time User Action Codes Description Comment    1/23/2024  5:04 PM Kaylee Lechuga [F29] Psychosis (HCC)     1/23/2024  5:05 PM Kaylee Lechuga [R45.1] Agitation     1/23/2024  5:05 PM  Kaylee Lechuga Add [Z86.59] History of schizoaffective disorder           ED Disposition       ED Disposition   Transfer to Behavioral Health Condition   --    Date/Time   Tue Jan 23, 2024  5:01 PM    Comment   Vero Quijano should be transferred out to [facility] and has been medically cleared.               MD Documentation      Flowsheet Row Most Recent Value   Patient Condition The patient has been stabilized such that within reasonable medical probability, no material deterioration of the patient condition or the condition of the unborn child(juan) is likely to result from the transfer   Reason for Transfer Level of Care needed not available at this facility   Benefits of Transfer Specialized equipment and/or services available at the receiving facility (Include comment)________________________  [Behavioral/psychiatric care]   Risks of Transfer Potential for delay in receiving treatment   Accepting Physician Dr. Kerry Moore   Accepting Facility Name, [place] [address]    (Name & Tel number) Crisis   Sending MD Hieu Obregon   Provider Certification General risk, such as traffic hazards, adverse weather conditions, rough terrain or turbulence, possible failure of equipment (including vehicle or aircraft), or consequences of actions of persons outside the control of the transport personnel          RN Documentation      Flowsheet Row Most Recent Value   Accepting Facility Name, [place] [address]   Bed Assignment Per RN / Admissions    (Name & Tel number) Crisis   Report Given to RN to RN   Medications Reviewed with Next Provider of Service Yes   Transport Mode Ambulance   Level of Care Basic life support   Copies of Medical Records Sent History and Physical, Orders, Progress note, Nursing note, Transfer form, Labs, Med Rec form, Other (comment)  [302]   Patient Belongings Disposition Sent with patient    Transfer Date 01/23/24          Follow-up Information    None       Discharge Medication List as of 1/23/2024  8:41 PM        CONTINUE these medications which have NOT CHANGED    Details   cetirizine (ZyrTEC) 10 mg tablet TAKE 1 TABLET (10 MG TOTAL) BY MOUTH DAILY, Starting Thu 9/23/2021, Normal      fluticasone (FLONASE) 50 mcg/act nasal spray 1 SPRAY INTO EACH NOSTRIL DAILY, Starting Mon 11/29/2021, Normal      INVEGA SUSTENNA 156 MG/ML IM injection Inject 156 mg into a muscle every 30 (thirty) days , Starting Fri 4/10/2020, Historical Med      nicotine polacrilex (NICORETTE) 4 mg gum Chew 1 each (4 mg total) as needed for smoking cessation, Starting Tue 4/20/2021, Sample           No discharge procedures on file.       ED Provider  Electronically Signed by     Girma Blanca MD  01/24/24 6595

## 2024-01-23 NOTE — ED NOTES
Per Eugene at [facility], they can accept pending a COVID test.  Test ordered.  Crisis to follow-up with [facility] once COVID results to get accepting facility details; re-send revised precert form; update [facility].  Will need to amend / revise EMTALA and Medical Necessity form details accordingly.

## 2024-01-23 NOTE — ED NOTES
No [facility] or [place] 302s accepted at [facility] following facilities: [facility], [facility], [facility]), [facility], [facility], [facility], [place], [facility], [facility], [facility], [facility], [facility], [facility], [facility], [facility] (302 petitioner must appear in person and facilities are several hours away).     [facility] - per Shivam, they can review; referral faxed  [facility] - per Yamilka, they can review; referral faxed  [facility] - per Carlitos, they can review; referral faxed  [facility] - per Crista, they can review; referral faxed   [facility] - per Eugene, they can review; referral faxed  [facility] - per Ewa, they have no capacity for high acuity referrals at this time  [facility] - per Shruti, only dual beds available for adults currently  [facility] - per Claudia, they can review; referral faxed  [facility] - no beds per [facility] - voice mail message left, requesting a return call  [facility] - no beds per Morelia, but may fax for discharge beds if needed  [facility] - no beds per Srav[facility] Weston - no beds per Marcelo Rascon- can review per Hayden; referral faxed  [facility] - no beds today, but they can review for tomorrow per Windy; referral faxed    Referrals were faxed to the following facilities: [facility], [facility], [facility], [facility], [facility], [facility], and [facility].  Crisis to follow-up.

## 2024-01-23 NOTE — ED NOTES
Phone call was placed to [facility] / [facility] to update both on patient's acceptance to [facility].  Copy of completed 302 including ACT 77  faxed to [facility] at [phone].

## 2024-01-23 NOTE — ED NOTES
Insurance Authorization for admission:   Patient has [employer] Medicare.   Required form completed and faxed to [phone].  Form and fax confirmation will be forwarded to [facility].  Stephen understands the need for follow-up and for completion of COB with Marianela on arrival.

## 2024-01-23 NOTE — ED PROVIDER NOTES
"History  Chief Complaint   Patient presents with    Psychiatric Evaluation     BIBA & police in handcuffs, patient was at home in the bathroom screaming that her mother was trying to kill her. Said she was hearing voices. Family called police for assistance. Patient very uncooperative, tangential at this time.        Psychiatric Evaluation  Presenting symptoms: agitation and hallucinations      Patient is a 48-year-old female with a past psychiatric history of schizoaffective disorder.  911 was called to her home by family.  Police and EMS found her in her bathroom screaming that her mother was going to kill her and that she is hearing voices.  She was combative with police and EMS and refused to leave the bathroom.  Eventually they were able to get her handcuffed and put into a stretcher and brought to the hospital.  In the hospital she is displaying a tangential and disorganized thought process.  She has rapid and pressured speech.  She is disorganized in her behavior.  She is expressing paranoid  delusions.  She expresses that she believes hospital staff is sent by the devil to harm her.  During exam patient was refusing to be examined and refusing any medications.  Patient was asked if she was allergic to Haldol and she responded that in the 90s \"demons took over my face when they gave me Haldol.\"  Patient described acute dystonic symptoms when she was last given Haldol.    This note writer spoke with patient's mother Shanna.  She states that her daughter used to live in a group home but her mother was concerned about the conditions of this home so she brought her to stay with her.  She has been living with mom for 8 months and mom states that through these 8 months she has been in a constant state of psychosis.  She states that she gets Invega Trinza but that this does not help her.  She states that she has been concerned about her daughter's behavior.  She describes that her daughter believes their downstairs " "neighbors in their condo complex are plotting against her and wanting to harm her.  She believes that they are planning sounds only she can hear.  Her mother endorses other auditory hallucinations in the form of hearing people that are not there.  She states that her daughter does respond to the voices she hears.  She believes that she is also experiencing visual hallucinations because she is stated that she is seeing people that her mother cannot see.  She also has been mostly staying in her living room and kitchen because she believes these are the only places in her house that prevent the \"invisible people\" from getting to her.  She also has told her mother that her mother and the downstairs neighbors are not actually who they say they are in her impostors sent by the \"Coven.\"  Her mother describes that the Coven is a group of people from another dimension sent to harm her and also sending people to replace people in her life.  She adds that her daughter had a suicide attempt in  when she attempted to self immolate. Her mother also states that her daughter has expressed desire to kill her neighbors.     Prior to Admission Medications   Prescriptions Last Dose Informant Patient Reported? Taking?   INVEGA SUSTENNA 156 MG/ML IM injection  Self Yes No   Sig: Inject 156 mg into a muscle every 30 (thirty) days    cetirizine (ZyrTEC) 10 mg tablet   No No   Sig: TAKE 1 TABLET (10 MG TOTAL) BY MOUTH DAILY   fluticasone (FLONASE) 50 mcg/act nasal spray   No No   Si SPRAY INTO EACH NOSTRIL DAILY   nicotine polacrilex (NICORETTE) 4 mg gum   No No   Sig: Chew 1 each (4 mg total) as needed for smoking cessation   Patient not taking: Reported on 2021      Facility-Administered Medications: None       Past Medical History:   Diagnosis Date    Acute hypoxemic respiratory failure (HCC) 3/27/2021    Schizoaffective disorder (HCC)        No past surgical history on file.    No family history on file.  I have reviewed " and agree with the history as documented.    E-Cigarette/Vaping    E-Cigarette Use Never User      E-Cigarette/Vaping Substances    Nicotine No     THC No     CBD No     Flavoring No     Other No     Unknown No      Social History     Tobacco Use    Smoking status: Every Day     Current packs/day: 1.00     Average packs/day: 1 pack/day for 35.0 years (35.0 ttl pk-yrs)     Types: Cigarettes    Smokeless tobacco: Never    Tobacco comments:     Pt states 18 cigarettes per day   Vaping Use    Vaping status: Never Used   Substance Use Topics    Alcohol use: Never    Drug use: No        Review of Systems   Constitutional: Negative.    HENT: Negative.     Eyes: Negative.    Respiratory: Negative.     Cardiovascular: Negative.    Gastrointestinal: Negative.    Endocrine: Negative.    Genitourinary: Negative.    Musculoskeletal: Negative.    Allergic/Immunologic: Negative.    Neurological: Negative.    Hematological: Negative.    Psychiatric/Behavioral:  Positive for agitation, behavioral problems and hallucinations.        Physical Exam  ED Triage Vitals [01/22/24 2154]   Temperature Pulse Resp Blood Pressure SpO2   99.7 °F (37.6 °C) (!) 138 -- (!) 174/95 99 %      Temp Source Heart Rate Source Patient Position - Orthostatic VS BP Location FiO2 (%)   Axillary Monitor Lying Right arm --      Pain Score       --             Orthostatic Vital Signs  Vitals:    01/22/24 2154 01/22/24 2257   BP: (!) 174/95 (!) 202/94   Pulse: (!) 138 (!) 126   Patient Position - Orthostatic VS: Lying        Physical Exam  HENT:      Head: Normocephalic and atraumatic.   Eyes:      Conjunctiva/sclera: Conjunctivae normal.   Cardiovascular:      Rate and Rhythm: Tachycardia present.      Heart sounds: Normal heart sounds.   Pulmonary:      Effort: Pulmonary effort is normal.      Breath sounds: Normal breath sounds.   Abdominal:      Palpations: Abdomen is soft.      Tenderness: There is no abdominal tenderness.   Neurological:      General: No  focal deficit present.   Psychiatric:         Attention and Perception: She perceives auditory and visual hallucinations.         Mood and Affect: Affect is angry.         Speech: Speech is rapid and pressured.         Behavior: Behavior is agitated and aggressive.         Thought Content: Thought content is paranoid.         ED Medications  Medications   haloperidol lactate (HALDOL) injection 5 mg (5 mg Intramuscular Given 1/22/24 2253)   benztropine (COGENTIN) injection 1 mg (1 mg Intramuscular Given 1/22/24 2253)   LORazepam (ATIVAN) injection 2 mg (2 mg Intramuscular Given 1/22/24 2256)       Diagnostic Studies  Results Reviewed       Procedure Component Value Units Date/Time    CBC and differential [[id number]] Collected: 01/22/24 2356    Lab Status: No result Specimen: Blood from Arm, Left     Comprehensive metabolic panel [[id number]] Collected: 01/22/24 2356    Lab Status: No result Specimen: Blood from Arm, Left     TSH, 3rd generation with Free T4 reflex [[id number]] Collected: 01/22/24 2356    Lab Status: No result Specimen: Blood from Arm, Left     POCT pregnancy, urine [[id number]]     Lab Status: No result     Rapid drug screen, urine [[id number]]     Lab Status: No result Specimen: Urine     POCT alcohol breath test [[id number]]     Lab Status: No result                    No orders to display         Procedures  Procedures      ED Course  ED Course as of 01/22/24 2359 Mon Jan 22, 2024 2340 Patient is a 48-year-old female with a past psychiatric history of schizoaffective disorder.  911 was called to her home by family.  Police and EMS found her in her bathroom screaming that her mother was going to kill her and that she is hearing voices.  She was combative with police and EMS and refused to leave the bathroom. She expressed to her mother that she wanted to kill her neighbors. She also has been experiencing auditory and visual hallucinations per her mother. She expressed paranoid delusions that staff  were sent by the devil to harm her.     A two doctor 302 has been petitioned. .    0672 302 paperwork handed off to nursing                                        Medical Decision Making  Amount and/or Complexity of Data Reviewed  Labs: ordered.    Risk  Prescription drug management.          Disposition  Final diagnoses:   None     ED Disposition       None          Follow-up Information    None         Patient's Medications   Discharge Prescriptions    No medications on file     No discharge procedures on file.    PDMP Review       None             ED Provider  Attending physically available and evaluated Vero Quijano. I managed the patient along with the ED Attending.    Electronically Signed by  DO Luis Recinos DO  01/22/24 3917

## 2024-01-23 NOTE — ED CARE HANDOFF
Emergency Department Sign Out Note        Sign out and transfer of care from Dr. Nolan. See Separate Emergency Department note.     The patient, Vero Quijano, was evaluated by the previous provider for acute psychosis .    Workup Completed:  labs    ED Course / Workup Pending (followup):   Vero Quijano, was evaluated by the previous provider for acute psychosis.  Patient has long history of schizoaffective disorder.  Was at a group home until 8 months ago when she was pulled by mom.  Since then has been living at home and deteriorating dRequired Haldol, Ativan, Cogentin and locked restraints.     PATIENT HAS BEEN MEDICALLY CLEARED FOR PSCYHIATRIC ADMISSION                                           Procedures  Medical Decision Making  Amount and/or Complexity of Data Reviewed  Labs: ordered.            Disposition  Final diagnoses:   None     ED Disposition       None          Follow-up Information    None       Patient's Medications   Discharge Prescriptions    No medications on file     No discharge procedures on file.       ED Provider  Electronically Signed by     Hieu Obregon DO  01/23/24 0941

## 2024-01-23 NOTE — ED NOTES
Patient has [employer] Medicare    Required form updated / completed as patient has been accepted to [facility].    Form faxed to [phone].  Form and fax confirmation will also be forwarded to Ernie davalos [facility] at [phone] as they will need to follow up and for completion of COB with Marianela on arrival.

## 2024-01-23 NOTE — ED NOTES
Patient is accepted at [facility]  Patient is accepted by Dr. Kerry Klein    Transportation is arranged with [facility].     Transportation is scheduled for  17:[facility]   Patient may go to the floor at  upon arrival        Nurse report is to be called to [phone] prior to patient transfer.

## 2024-01-23 NOTE — ED ATTENDING ATTESTATION
"1/22/2024  I, Hodan Phillips MD, saw and evaluated the patient. I have discussed the patient with the resident/non-physician practitioner and agree with the resident's/non-physician practitioner's findings, Plan of Care, and MDM as documented in the resident's/non-physician practitioner's note, except where noted. All available labs and Radiology studies were reviewed.  I was present for key portions of any procedure(s) performed by the resident/non-physician practitioner and I was immediately available to provide assistance.       At this point I agree with the current assessment done in the Emergency Department.  I have conducted an independent evaluation of this patient a history and physical is as follows:      49 yo female with h/o schizoaffective d/o presents with agitation and paranoia.  Brought in by police.  Pt agitated and non-cooperative on exam.  No obvious signs of trauma.  NO pain complaint.  No focal deficits.  Pupils are 3mm bilaterally without nystagmus.  Pt required chemical and physical restraint for patient and staff safety.  Mom is willing to fill out 302 document.  Per mom pt has lived intermittently in group homes and institutions over course of adulthood.  Mom removed pt from group home around 8 months ago.  Since then pt with increasing paranoia and bizzarre behavior.  Mom reports pt consistently hears voices, reports \"HUD folk\" speak to her.  Pt also fixated on downstairs neighbor \"Ewa\" making noises.  Mom is afraid that pt is going to hurt, her, herself or Ewa.  At this point pt requires inpatient psychiatric care after medical clearance.  302 paperwork initiated.     Signed out to Dr. Nolan pending completion of lab testing and re-eval.            Past Medical History:   Diagnosis Date    Acute hypoxemic respiratory failure (HCC) 3/27/2021    Schizoaffective disorder (HCC)            ED Course         Critical Care Time  Procedures      "

## 2024-01-23 NOTE — ED NOTES
"Patient seen talking to an unseen entity, stating \"you're the one who told me you were the different species. You're not gonna make me into one of the vanished when you have all these volunteers here and a lot of them are homeless.\" RN checked on patient to ensure she was doing okay. Patient told RN \"Don't bother me no more, it's because you knocked on my door every day for 20 years that God is gonna kill me and he's gonna kill you too.\" Anselmo continued, RN exited.      Supriya Murry RN  01/22/24 0071    "

## 2024-01-23 NOTE — ED NOTES
Patient is accepted at [facility].  Patient is accepted by Dr. Michael Mitchell.     Transportation is arranged with [facility].     Transportation is scheduled for TBD.   Patient may go to the floor at 7pm or later.          Nurse report is to be called to [phone] prior to patient transfer.

## 2024-01-23 NOTE — ED NOTES
The patient is a 48-year-old female who arrived to the emergency department via EMS last night.  Family had reportedly called due to concerns for her behavior.  Mother was willing to petition a 302, but emergency room physicians supported that and ultimately completed a 2 physician 302.  According to available information the patient locked herself in the bathroom and screaming that her mother was trying to kill her.  She was combative with the EMS.  She expressed paranoid delusions that hospital staff were sent by the devil to harm her and that her mother had been replaced by a replica.  She was disorganized with her thought process and behavior.  She had reportedly been experiencing auditory and visual hallucinations at home and made threats to kill her neighbors.  Insight and judgment were very poor.  On arrival, she was resistive and combative.  She did require IM medications and physical restraints.  She was described as agitated and uncooperative.  She exhibited paranoia, persecutory delusions, and ideas of reference.  She believes that she had been in contact with the physician via Facebook and accused the physician of being the devil.  She continued with disorganized thoughts and speech.  She expressed a variety of delusions her mother also reported that she was covering objects with aluminum foil and was fixated on the downstairs neighbor.  ED physicians upheld the 302 petition late last night.  The patient was clinically sedated and seen by crisis this morning.  The patient was alert and appeared oriented to person, place, and partially situation.  She was tangential with rambling rapid speech.  She expressed a variety of persecutory ideas and delusional thoughts.  Looseness of association was observed.  She was generally angry and irritable.  She was resistive to answering questions but did deny suicidal ideation currently.  History could not be obtained.  She denied any current homicidal thoughts, but  "this is in contradiction to reports by family.  She is a poor historian and information offered to her is questionable.  She denied any hallucinations, but clearly is delusional at this time with active psychosis.  She was reluctant to answer most questions and continued to ramble about white people being drug dealers and a variety of conspiracies, indicating that she does not want any medical or mental health treatment from this facility.  She was advised of the petition to 302.  Her rights were read to her out loud.  She did interrupt repeatedly.  Crisis attempted to complete review of the 302 rights, but she became insulted and stated \"I can read\" and requested her rights and writing which were provided to her along with her patient's Bill of Rights.  She was advised that crisis would initiate a bed search and inform her once a transfer has been arranged.  Unable to assess fully for sleep, appetite, and medication compliance, but the patient did indicate that she is active with [facility] clinic as an outpatient.  The completed 302 and act 77 were forwarded to [facility] via email.  The referral was also faxed to intake.  Bed search pending in network placement availability.  Crisis to follow up.  "

## 2024-01-23 NOTE — ED NOTES
"Provider at bedside. Patient continues to say \"I am not a donor. I have messaged [employer] and you blocked  me on messenger.\"      Supriya Murry RN  01/22/24 5434    "

## 2024-01-24 NOTE — ED NOTES
Patient belongings given to transportation staff at this time.     Dane Byrnes RN  01/23/24 2028

## 2024-01-27 LAB
ATRIAL RATE: 95 BPM
P AXIS: 68 DEGREES
PR INTERVAL: 150 MS
QRS AXIS: 92 DEGREES
QRSD INTERVAL: 74 MS
QT INTERVAL: 362 MS
QTC INTERVAL: 454 MS
T WAVE AXIS: 47 DEGREES
VENTRICULAR RATE: 95 BPM

## 2024-03-10 NOTE — PROGRESS NOTES
Encounter Date: 3/10/2024       History     Chief Complaint   Patient presents with    Abdominal Pain     Currently on antibiotics for UTI x 3 days. Presents today with c/o generalized abdominal pain and lower back pain with N/V. Denies fever. Also c/o BLE pain. Spouse reports that patient was hallucinating earlier today. Presents awake, alert. Indwelling  catheter in place.     Patient is a 66 year old female who is currently on Cipro for treatment of urinary tract infection now presenting with increased abdominal pain, lower back pain and nausea.  She has also had subjective fever at home with chills.  Her  says his wife was hallucinating earlier this morning.      Review of patient's allergies indicates:   Allergen Reactions    (d)-limonene flavor      Other reaction(s): difficult intubation  Other reaction(s): Difficulty breathing    Benadryl [diphenhydramine hcl] Shortness Of Breath    Fentanyl Itching, Nausea And Vomiting and Swelling             Imitrex [sumatriptan succinate] Shortness Of Breath    Oxycodone-acetaminophen Shortness Of Breath    Sulfamethoxazole-trimethoprim Anaphylaxis    Topiramate Shortness Of Breath    Vancomycin Anaphylaxis     Rash    Butorphanol tartrate     Evening primrose (oenothera biennis)     Latex     Pregabalin Other (See Comments)     tremors    Propoxyphene n-acetaminophen      Other reaction(s): Difficulty breathing    Sumatriptan     White petrolatum-zinc     Zinc acetate     Zinc oxide-white petrolatum      Other reaction(s): Difficulty breathing    Latex, natural rubber Itching and Rash    Phenytoin Rash and Other (See Comments)     Trouble breathing     Past Medical History:   Diagnosis Date    Anxiety     Arthritis     Bilateral lower extremity edema     severe chronic    Carotid artery occlusion     Cataract     CHF (congestive heart failure)     Coronary artery disease     subtotalled LAD with collateral    Depression     Fever blister     Hard of hearing      Pt denies all symptoms  Pt seclusive to room only out for meals  When nurse was speaking with pt she had the covers pulled over her head and was scant  Pt is calm and cooperative  Pt still appears paranoid  Will monitor  Hypokalemia 01/09/2023    Hyponatremia 02/04/2022    Hypothyroid     Iron deficiency anemia     Lumbar radiculopathy     with chronic pain    Ocular migraine     Other emphysema 05/22/2023    Renal disorder     Sleep apnea     cpap     Past Surgical History:   Procedure Laterality Date    ADENOIDECTOMY      BACK SURGERY      x 12    CARDIAC CATHETERIZATION  2016    subtotalled LAD with right to left collaterals    CATARACT EXTRACTION W/  INTRAOCULAR LENS IMPLANT Left     Dr Coleman     CYSTOSCOPIC LITHOLAPAXY N/A 6/27/2019    Procedure: CYSTOLITHOLAPAXY;  Surgeon: Shireen Mayo MD;  Location: [facility] OR 2ND FLR;  Service: Urology;  Laterality: N/A;    CYSTOSCOPIC LITHOLAPAXY N/A 9/3/2019    Procedure: CYSTOLITHOLAPAXY;  Surgeon: Shireen Mayo MD;  Location: [facility] OR 2ND FLR;  Service: Urology;  Laterality: N/A;    CYSTOSCOPY N/A 7/13/2021    Procedure: CYSTOSCOPY;  Surgeon: Shireen Mayo MD;  Location: [facility] OR 1ST FLR;  Service: Urology;  Laterality: N/A;    CYSTOSCOPY  11/16/2021    Procedure: CYSTOSCOPY;  Surgeon: Shireen Mayo MD;  Location: [facility] OR 1ST FLR;  Service: Urology;;    CYSTOSCOPY  7/19/2022    Procedure: CYSTOSCOPY;  Surgeon: Shireen Mayo MD;  [facility];  Service: Urology;;    CYSTOSCOPY WITH INJECTION OF PERIURETHRAL BULKING AGENT  7/19/2022    Procedure: CYSTOSCOPY, WITH PERIURETHRAL BULKING AGENT INJECTION-MACROPLASTIQUE;  Surgeon: Shireen Mayo MD;  Location: [facility] OR 1ST FLR;  Service: Urology;;    CYSTOSCOPY WITH INJECTION OF PERIURETHRAL BULKING AGENT N/A 3/28/2023    Procedure: CYSTOSCOPY, WITH PERIURETHRAL BULKING AGENT INJECTION;  Surgeon: Shireen Mayo MD;  Location: [facility] OR 1ST FLR;  Service: Urology;  Laterality: N/A;  [place]    CYSTOSCOPY WITH INJECTION OF PERIURETHRAL BULKING AGENT N/A 11/14/2023    Procedure: CYSTOSCOPY, WITH PERIURETHRAL BULKING AGENT INJECTION;  Surgeon: Shireen Mayo MD;  Location: [facility] OR 1ST FLR;  Service: Urology;   Laterality: N/A;    CYSTOSCOPY,WITH BOTULINUM TOXIN INJECTION N/A 12/13/2022    Procedure: CYSTOSCOPY,WITH BOTULINUM TOXIN INJECTION;  Surgeon: Shireen Mayo MD;  Location: [facility] OR 1ST FLR;  Service: Urology;  Laterality: N/A;  300 U    CYSTOSCOPY,WITH BOTULINUM TOXIN INJECTION N/A 3/28/2023    Procedure: CYSTOSCOPY,WITH BOTULINUM TOXIN INJECTION;  Surgeon: Shireen Mayo MD;  Location: [facility] OR 1ST FLR;  Service: Urology;  Laterality: N/A;  45 MIN.    300 UNITS    ESOPHAGOGASTRODUODENOSCOPY N/A 5/23/2018    Procedure: ESOPHAGOGASTRODUODENOSCOPY (EGD);  Surgeon: Prince Vance MD;  Location: [facility] ENDO (4TH FLR);  Service: Endoscopy;  Laterality: N/A;  r/s 'd per Dr. Vance due to family emergency- ER    ESOPHAGOGASTRODUODENOSCOPY N/A 6/23/2023    Procedure: EGD (ESOPHAGOGASTRODUODENOSCOPY);  Surgeon: Juaquin Barry MD;  Location: [facility] ENDO (2ND FLR);  Service: Endoscopy;  Laterality: N/A;  Refferal: PRINCE VANCE  Order  tele enc 5/18/23  dx: history of a Nissen fundoplication  Additional Scheduling Information:  On home oxygen 2nd floor for airway protection also with a pain pump elevated BMI close to 40   Prep Gastroparesis   ins    HYSTERECTOMY  1975    endometriosis    INJECTION OF BOTULINUM TOXIN TYPE A  7/13/2021    Procedure: INJECTION, BOTULINUM TOXIN, 200units;  Surgeon: Shireen Mayo MD;  [facility];  Service: Urology;;    INJECTION OF BOTULINUM TOXIN TYPE A  11/16/2021    Procedure: INJECTION, BOTULINUM TOXIN, 200units;  Surgeon: Shireen Mayo MD;  [facility];  Service: Urology;;    INJECTION OF BOTULINUM TOXIN TYPE A  7/19/2022    Procedure: INJECTION, BOTULINUM TOXIN, 300 units ;  Surgeon: Shireen Mayo MD;  [facility];  Service: Urology;;    INJECTION OF BOTULINUM TOXIN TYPE A N/A 11/14/2023    Procedure: INJECTION, BOTULINUM TOXIN, TYPE A;  Surgeon: Shireen Mayo MD;  Location: [facility] OR [address] [place] [address];  Service: Urology;   Laterality: N/A;    INSERTION, SUPRAPUBIC CATHETER N/A 12/13/2022    Procedure: INSERTION, SUPRAPUBIC CATHETER;  Surgeon: Shireen Mayo MD;  [facility];  Service: Urology;  Laterality: N/A;  exchange    INSERTION, SUPRAPUBIC CATHETER N/A 11/14/2023    Procedure: INSERTION, SUPRAPUBIC CATHETER;  Surgeon: Shireen Mayo MD;  [facility];  Service: Urology;  Laterality: N/A;  EXCHANGE of s/p tube    pain pump placement      SQ Dilaudid Pump managed by Dr. Hillman, Pain Management    REMOVAL OF BONE SPUR OF FOOT Bilateral 9/16/2022    Procedure: EXCISION ARTHRITIC BONE, BILATERAL FOOT;  Surgeon: Adam Mcguire [facility];  Location: [facility] OR;  Service: Podiatry;  Laterality: Bilateral;    REPLACEMENT OF BACLOFEN PUMP N/A 8/2/2023    Procedure: REPLACEMENT, BACLOFEN PUMP;  Surgeon: Smitha Condon MD;  [facility];  Service: Neurosurgery;  Laterality: N/A;  Anes: Gen  Blood: Type & Screen  Pos: Left Lat  Intrathecal Pump  Bed: Reg Reversed  Rad: C-arm  Pump: 40 cc, [employer]tronics    REPLACEMENT OF CATHETER N/A 10/31/2019    Procedure: REPLACEMENT, CATHETER-SUPRAPUBIC;  Surgeon: Shireen Mayo MD;  Location: [facility] OR [address] [place] [address];  Service: Urology;  Laterality: N/A;    SPINAL CORD STIMULATOR REMOVAL      before [place]    SPINE SURGERY  5-13-13    CERVICAL FUSION    TONSILLECTOMY       Family History   Problem Relation Age of Onset    Cancer Mother 55        breast    Cancer Father         esophagus,had laryngectomy    Esophageal cancer Father     Parkinsonism Maternal Grandmother     Tremor Maternal Grandmother     No Known Problems Brother     No Known Problems Brother     Heart disease Maternal Uncle     Colon cancer Maternal Uncle         Less than 60    No Known Problems Sister     No Known Problems Maternal Aunt     Cirrhosis Paternal Aunt         ETOH    Liver disease Paternal Aunt         ETOH    Liver disease Paternal Uncle         ETOH    Cirrhosis Paternal Uncle        "  ETOH    No Known Problems Maternal Grandfather     No Known Problems Paternal Grandmother     No Known Problems Paternal Grandfather     Melanoma Neg Hx     Amblyopia Neg Hx     Blindness Neg Hx     Cataracts Neg Hx     Diabetes Neg Hx     Glaucoma Neg Hx     Hypertension Neg Hx     Macular degeneration Neg Hx     Retinal detachment Neg Hx     Strabismus Neg Hx     Stroke Neg Hx     Thyroid disease Neg Hx     Celiac disease Neg Hx     Colon polyps Neg Hx     Cystic fibrosis Neg Hx     Crohn's disease Neg Hx     Inflammatory bowel disease Neg Hx     Liver cancer Neg Hx     Rectal cancer Neg Hx     Stomach cancer Neg Hx     Ulcerative colitis Neg Hx     Lymphoma Neg Hx      Social History     Tobacco Use    Smoking status: Never    Smokeless tobacco: Never   Substance Use Topics    Alcohol use: Never    Drug use: Yes     Types: Marijuana     Comment: "medical marijuana gummies"     Review of Systems   Constitutional:  Positive for chills.   Gastrointestinal:  Positive for abdominal pain and nausea. Negative for vomiting.   Psychiatric/Behavioral:  Positive for hallucinations.        Physical Exam     Initial Vitals [03/10/24 0705]   BP Pulse Resp Temp SpO2   114/82 108 18 98.8 °F (37.1 °C) (!) 89 %      MAP       --         Physical Exam    Nursing note and vitals reviewed.  Constitutional: No distress.   HENT:   Head: Atraumatic.   Dry mucous membranes.   Cardiovascular:            ERIK.   Pulmonary/Chest: No respiratory distress.   Abdominal: Abdomen is soft.   Tenderness across the lower abdomen without guarding or rebound.     Neurological: She is alert and oriented to person, place, and time.   Skin: Skin is warm and dry.   Psychiatric: Her behavior is normal. Thought content normal.         ED Course   Procedures  Labs Reviewed   CBC W/ AUTO DIFFERENTIAL - Abnormal; Notable for the following components:       Result Value    RBC 3.91 (*)     Hemoglobin 10.9 (*)     Hematocrit 34.9 (*)     MCHC 31.2 (*)     RDW " 15.2 (*)     All other components within normal limits   COMPREHENSIVE METABOLIC PANEL - Abnormal; Notable for the following components:    Potassium 3.2 (*)     CO2 35 (*)     Glucose 120 (*)     ALT 8 (*)     eGFR 55 (*)     All other components within normal limits   URINALYSIS - Abnormal; Notable for the following components:    Protein, UA Trace (*)     Occult Blood UA Trace (*)     Leukocytes, UA 2+ (*)     All other components within normal limits   TSH - Abnormal; Notable for the following components:    TSH 7.311 (*)     All other components within normal limits   URINALYSIS MICROSCOPIC - Abnormal; Notable for the following components:    RBC, UA 21 (*)     WBC, UA 8 (*)     Yeast, UA Occasional (*)     Hyaline Casts, UA 2 (*)     All other components within normal limits   CULTURE, URINE   CULTURE, BLOOD   CULTURE, BLOOD   CULTURE, URINE   CK   TROPONIN I   MAGNESIUM   LACTIC ACID, PLASMA   T4, FREE          Imaging Results              CT Renal Stone Study ABD Pelvis WO (Final result)  Result time 03/10/24 09:07:18      Final result by Tomer Zarate MD (03/10/24 09:07:18)                   Impression:      1. Examination compromised by motion.  2. No evidence of radiopaque urolithiasis or obstructive uropathy, as questioned.  3. Nonspecific circumferential urinary bladder wall thickening, for which correlation for signs/symptoms of cystitis would be recommended.  4. Additional details of chronic and incidental findings, as provided in the body report.      Electronically signed by: Tomer Zarate  Date:    03/10/2024  Time:    09:07               Narrative:    EXAMINATION:  CT RENAL STONE STUDY ABD PELVIS WO    CLINICAL HISTORY:  Flank pain, kidney stone suspected;    TECHNIQUE:  Low dose axial images, sagittal and coronal reformations were obtained from the lung bases to the pubic symphysis.  Contrast was not administered.    COMPARISON:  CT of the abdomen pelvis  02/03/2022.    FINDINGS:  Evaluation of the solid organs is limited due to lack of intravenous contrast.    Lower chest: Assessment of the lower chest limited by respiratory motion.  Atelectasis.    URINARY COLLECTING SYSTEM: Cortical atrophy of the right greater left kidneys.    Liver: normal contour    Gallbladder and bile ducts: Unremarkable.    Pancreas: Normal contour.    Spleen: Normal contour.    Adrenals: Normal contour.    Lymph nodes: No abdominal or pelvic lymphadenopathy.    Bowel and mesentery: Moderate hiatal hernia.  No evidence of bowel obstruction.  Large volume colonic stool.  Appendix not definitely visualized.  No definite focal pericecal inflammation.    Abdominal aorta: Nonaneurysmal.  Mild atherosclerotic calcification.    Inferior vena cava: Unremarkable.    Free fluid or free air: None.    Pelvis: Suprapubic catheter in-situ.  Nonspecific mild circumferential bladder wall thickening.  Non dependent air noted within the urinary bladder.    Body wall: Diffuse body wall edema.  Generator device noted in the right anterior abdominal wall.  Leads extend into the spinal canal.  Prominent in number and mildly enlarged inguinal lymph nodes, may be reactive.    Bones: No definite acute abnormality.  Query osseous demineralization.  S shaped scoliotic curvature of the spine.  Status post L3-S1 posterior instrumented fusion.  Paired transpedicular screws secured with vertical stabilization rods are noted.  Interbody grafting at L3-4 L4-5, with apparent solid osseous incorporation across the graft.  Laminectomy additionally performed at these levels as well.  Anticipated architectural distortion noted in the posterior paravertebral soft tissues without discrete drainable fluid collection by unenhanced technique.                                       Medications   cefTRIAXone (Rocephin) 1 g in dextrose 5 % in water (D5W) 100 mL IVPB (MB+) (0 g Intravenous Stopped 3/10/24 1013)   sodium chloride 0.9%  bolus 250 mL 250 mL (0 mLs Intravenous Stopped 3/10/24 0925)   ketorolac injection 15 mg (15 mg Intravenous Given 3/10/24 0826)   ondansetron injection 4 mg (4 mg Intravenous Given 3/10/24 0826)   potassium chloride SA CR tablet 20 mEq (20 mEq Oral Given 3/10/24 0827)   HYDROcodone-acetaminophen  mg per tablet 1 tablet (1 tablet Oral Given 3/10/24 1003)     Medical Decision Making  DDx :  Including but not limited to :  UTI, pyelonephritis, urosepsis, dehydration, electrolyte abnormality.    Emergent evaluation of a 66-year-old female with abdominal pain and back pain.  She is currently being treated for UTI.  She has had no fever.  Urinalysis shows an improvement from prior.  CT without stones.  Patient was given a g of Rocephin intravenously here in the emergency department.  I have urged to continue her antibiotics until finished and follow up with her primary physician as soon as able for recheck.  She may return to the ED for any possible worsening.    Amount and/or Complexity of Data Reviewed  Labs: ordered.     Details: CBC with a hemoglobin of 10.9 and hematocrit of 34.9.  CMP with a potassium of 3.2.  Urinalysis with WBC 8.  Radiology: ordered.     Details: Renal CT without evidence of radiopaque urolithiasis.    Risk  Prescription drug management.                                      Clinical Impression:  Final diagnoses:  [N30.00] Acute cystitis without hematuria (Primary)          ED Disposition Condition    Discharge Stable          ED Prescriptions    None       Follow-up Information       Follow up With Specialties Details Why Contact Info    Mesfin Hodges II, MD Internal Medicine Schedule an appointment as soon as possible for a visit   [address]  [place] [address]  [phone]      [facility] Emergency Dept Emergency Medicine  If symptoms worsen [address] [facility] [address] [phone]             Sanket Castro MD  03/10/24 5957

## 2024-07-02 ENCOUNTER — HOSPITAL ENCOUNTER (EMERGENCY)
Facility: HOSPITAL | Age: 49
End: 2024-07-02
Attending: EMERGENCY MEDICINE
Payer: MEDICARE

## 2024-07-02 VITALS
RESPIRATION RATE: 20 BRPM | TEMPERATURE: 97.6 F | HEART RATE: 94 BPM | OXYGEN SATURATION: 96 % | SYSTOLIC BLOOD PRESSURE: 137 MMHG | DIASTOLIC BLOOD PRESSURE: 81 MMHG

## 2024-07-02 DIAGNOSIS — F29 PSYCHOSES (HCC): Primary | ICD-10-CM

## 2024-07-02 DIAGNOSIS — R45.1 AGITATION: ICD-10-CM

## 2024-07-02 LAB
ALBUMIN SERPL BCG-MCNC: 4 G/DL (ref 3.5–5)
ALP SERPL-CCNC: 76 U/L (ref 34–104)
ALT SERPL W P-5'-P-CCNC: 10 U/L (ref 7–52)
AMPHETAMINES SERPL QL SCN: NEGATIVE
ANION GAP SERPL CALCULATED.3IONS-SCNC: 7 MMOL/L (ref 4–13)
AST SERPL W P-5'-P-CCNC: 9 U/L (ref 13–39)
BARBITURATES UR QL: NEGATIVE
BASOPHILS # BLD AUTO: 0.06 THOUSANDS/ÂΜL (ref 0–0.1)
BASOPHILS NFR BLD AUTO: 1 % (ref 0–1)
BENZODIAZ UR QL: POSITIVE
BILIRUB SERPL-MCNC: 0.4 MG/DL (ref 0.2–1)
BILIRUB UR QL STRIP: NEGATIVE
BUN SERPL-MCNC: 9 MG/DL (ref 5–25)
CALCIUM SERPL-MCNC: 9.4 MG/DL (ref 8.4–10.2)
CHLORIDE SERPL-SCNC: 103 MMOL/L (ref 96–108)
CLARITY UR: CLEAR
CO2 SERPL-SCNC: 26 MMOL/L (ref 21–32)
COCAINE UR QL: NEGATIVE
COLOR UR: NORMAL
CREAT SERPL-MCNC: 0.89 MG/DL (ref 0.6–1.3)
EOSINOPHIL # BLD AUTO: 0.16 THOUSAND/ÂΜL (ref 0–0.61)
EOSINOPHIL NFR BLD AUTO: 2 % (ref 0–6)
ERYTHROCYTE [DISTWIDTH] IN BLOOD BY AUTOMATED COUNT: 13.8 % (ref 11.6–15.1)
ETHANOL SERPL-MCNC: <10 MG/DL
FENTANYL UR QL SCN: NEGATIVE
GFR SERPL CREATININE-BSD FRML MDRD: 75 ML/MIN/1.73SQ M
GLUCOSE SERPL-MCNC: 154 MG/DL (ref 65–140)
GLUCOSE SERPL-MCNC: 214 MG/DL (ref 65–140)
GLUCOSE UR STRIP-MCNC: NEGATIVE MG/DL
HCG SERPL QL: NEGATIVE
HCT VFR BLD AUTO: 44.7 % (ref 34.8–46.1)
HGB BLD-MCNC: 14.9 G/DL (ref 11.5–15.4)
HGB UR QL STRIP.AUTO: NEGATIVE
HOLD SPECIMEN: NORMAL
HYDROCODONE UR QL SCN: NEGATIVE
IMM GRANULOCYTES # BLD AUTO: 0.04 THOUSAND/UL (ref 0–0.2)
IMM GRANULOCYTES NFR BLD AUTO: 0 % (ref 0–2)
KETONES UR STRIP-MCNC: NEGATIVE MG/DL
LEUKOCYTE ESTERASE UR QL STRIP: NEGATIVE
LYMPHOCYTES # BLD AUTO: 1.8 THOUSANDS/ÂΜL (ref 0.6–4.47)
LYMPHOCYTES NFR BLD AUTO: 17 % (ref 14–44)
MCH RBC QN AUTO: 28.3 PG (ref 26.8–34.3)
MCHC RBC AUTO-ENTMCNC: 33.3 G/DL (ref 31.4–37.4)
MCV RBC AUTO: 85 FL (ref 82–98)
METHADONE UR QL: NEGATIVE
MONOCYTES # BLD AUTO: 0.52 THOUSAND/ÂΜL (ref 0.17–1.22)
MONOCYTES NFR BLD AUTO: 5 % (ref 4–12)
NEUTROPHILS # BLD AUTO: 7.86 THOUSANDS/ÂΜL (ref 1.85–7.62)
NEUTS SEG NFR BLD AUTO: 75 % (ref 43–75)
NITRITE UR QL STRIP: NEGATIVE
NRBC BLD AUTO-RTO: 0 /100 WBCS
OPIATES UR QL SCN: NEGATIVE
OXYCODONE+OXYMORPHONE UR QL SCN: NEGATIVE
PCP UR QL: NEGATIVE
PH UR STRIP.AUTO: 5.5 [PH]
PLATELET # BLD AUTO: 304 THOUSANDS/UL (ref 149–390)
PMV BLD AUTO: 9.5 FL (ref 8.9–12.7)
POTASSIUM SERPL-SCNC: 3.6 MMOL/L (ref 3.5–5.3)
PROT SERPL-MCNC: 7.4 G/DL (ref 6.4–8.4)
PROT UR STRIP-MCNC: NEGATIVE MG/DL
RBC # BLD AUTO: 5.26 MILLION/UL (ref 3.81–5.12)
SODIUM SERPL-SCNC: 136 MMOL/L (ref 135–147)
SP GR UR STRIP.AUTO: 1.01 (ref 1–1.03)
THC UR QL: NEGATIVE
TSH SERPL DL<=0.05 MIU/L-ACNC: 0.9 UIU/ML (ref 0.45–4.5)
UROBILINOGEN UR STRIP-ACNC: <2 MG/DL
WBC # BLD AUTO: 10.44 THOUSAND/UL (ref 4.31–10.16)

## 2024-07-02 PROCEDURE — 84443 ASSAY THYROID STIM HORMONE: CPT | Performed by: EMERGENCY MEDICINE

## 2024-07-02 PROCEDURE — 84703 CHORIONIC GONADOTROPIN ASSAY: CPT | Performed by: EMERGENCY MEDICINE

## 2024-07-02 PROCEDURE — 99285 EMERGENCY DEPT VISIT HI MDM: CPT

## 2024-07-02 PROCEDURE — 80053 COMPREHEN METABOLIC PANEL: CPT | Performed by: EMERGENCY MEDICINE

## 2024-07-02 PROCEDURE — 81003 URINALYSIS AUTO W/O SCOPE: CPT | Performed by: EMERGENCY MEDICINE

## 2024-07-02 PROCEDURE — 93005 ELECTROCARDIOGRAM TRACING: CPT

## 2024-07-02 PROCEDURE — 80307 DRUG TEST PRSMV CHEM ANLYZR: CPT

## 2024-07-02 PROCEDURE — 96372 THER/PROPH/DIAG INJ SC/IM: CPT

## 2024-07-02 PROCEDURE — 82948 REAGENT STRIP/BLOOD GLUCOSE: CPT

## 2024-07-02 PROCEDURE — 82077 ASSAY SPEC XCP UR&BREATH IA: CPT | Performed by: EMERGENCY MEDICINE

## 2024-07-02 PROCEDURE — 85025 COMPLETE CBC W/AUTO DIFF WBC: CPT | Performed by: EMERGENCY MEDICINE

## 2024-07-02 PROCEDURE — 99285 EMERGENCY DEPT VISIT HI MDM: CPT | Performed by: EMERGENCY MEDICINE

## 2024-07-02 PROCEDURE — 36415 COLL VENOUS BLD VENIPUNCTURE: CPT

## 2024-07-02 RX ORDER — MIDAZOLAM HYDROCHLORIDE 2 MG/2ML
INJECTION, SOLUTION INTRAMUSCULAR; INTRAVENOUS
Status: COMPLETED
Start: 2024-07-02 | End: 2024-07-02

## 2024-07-02 RX ORDER — DROPERIDOL 2.5 MG/ML
2.5 INJECTION, SOLUTION INTRAMUSCULAR; INTRAVENOUS ONCE
Status: COMPLETED | OUTPATIENT
Start: 2024-07-02 | End: 2024-07-02

## 2024-07-02 RX ORDER — QUETIAPINE FUMARATE 25 MG/1
50 TABLET, FILM COATED ORAL ONCE
Status: DISCONTINUED | OUTPATIENT
Start: 2024-07-02 | End: 2024-07-03 | Stop reason: HOSPADM

## 2024-07-02 RX ORDER — HALOPERIDOL 5 MG/ML
INJECTION INTRAMUSCULAR
Status: DISCONTINUED
Start: 2024-07-02 | End: 2024-07-02

## 2024-07-02 RX ORDER — MIDAZOLAM HYDROCHLORIDE 2 MG/2ML
2 INJECTION, SOLUTION INTRAMUSCULAR; INTRAVENOUS ONCE
Status: DISCONTINUED | OUTPATIENT
Start: 2024-07-02 | End: 2024-07-02

## 2024-07-02 RX ORDER — LORAZEPAM 1 MG/1
1 TABLET ORAL ONCE
Status: DISCONTINUED | OUTPATIENT
Start: 2024-07-02 | End: 2024-07-03 | Stop reason: HOSPADM

## 2024-07-02 RX ORDER — MIDAZOLAM HYDROCHLORIDE 2 MG/2ML
2 INJECTION, SOLUTION INTRAMUSCULAR; INTRAVENOUS ONCE
Status: COMPLETED | OUTPATIENT
Start: 2024-07-02 | End: 2024-07-02

## 2024-07-02 RX ADMIN — MIDAZOLAM HYDROCHLORIDE 2 MG: 2 INJECTION, SOLUTION INTRAMUSCULAR; INTRAVENOUS at 16:15

## 2024-07-02 RX ADMIN — DROPERIDOL 2.5 MG: 2.5 INJECTION, SOLUTION INTRAMUSCULAR; INTRAVENOUS at 16:15

## 2024-07-02 RX ADMIN — MIDAZOLAM 2 MG: 1 INJECTION INTRAMUSCULAR; INTRAVENOUS at 16:15

## 2024-07-02 NOTE — ED NOTES
Pt cooperative for blood draw for psychiatric admission with security at bedside. Pt stated that this RN was stealing her blood and that she is not a donor. This RN educated pt that we were checking blood work to check her health. Pt continued to state that this RN was going to steal babies from the hospital. Pt continues to be incoherent with word salad & flight of ideas. Blood work sent. Pt aware that she will need to provide a urine sample for us and pt told this RN to get out of the room. Pt laid onto her side with her arms crossed. Purvis given to pt.      Eva Schaffer RN  07/02/24 5364
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
## 2024-07-02 NOTE — ED NOTES
Pt stated she did not want to eat at this time. Dinner tray placed in pt's room.     Liz Young  07/02/24 1286

## 2024-07-02 NOTE — ED PROVIDER NOTES
History  Chief Complaint   Patient presents with    Psychiatric Evaluation     Pt to ED via EMS with [address]. Schizoaffective. Patient arrives agitated, verbally aggressive towards staff.      HPI    (Vero MARILY Quijano) Vero Quijano is a 49 y.o. female with a significant PMHx of schizoaffective disorder presents to the emergency department on 2024 for agitation onset today. Pt presented agitated with verbal aggression. Unable to verbally deescalate or redirect. Pt was given IM Droperidol and Versed. Will speak with mother for collateral information      Allergies include:  Allergies   Allergen Reactions    Risperidone Shortness Of Breath     Previously tolerated Invega Sustenna and Invega tablets without a problem.    Bacitracin     Effexor [Venlafaxine] Other (See Comments)     Causes elevated glucose    Haloperidol Other (See Comments)     Tardive Dyskinesia      Lexapro [Escitalopram] Other (See Comments)     hyperpigmentation    Zinc     Zyprexa [Olanzapine] Other (See Comments)     Causes elevated glucose         Immunizations:  Immunization History   Administered Date(s) Administered    HPV Quadrivalent 2010    Tdap 2015     Immunizations Reviewed.    Prior to Admission Medications   Prescriptions Last Dose Informant Patient Reported? Taking?   INVEGA SUSTENNA 156 MG/ML IM injection  Self Yes No   Sig: Inject 156 mg into a muscle every 30 (thirty) days    cetirizine (ZyrTEC) 10 mg tablet   No No   Sig: TAKE 1 TABLET (10 MG TOTAL) BY MOUTH DAILY   fluticasone (FLONASE) 50 mcg/act nasal spray   No No   Si SPRAY INTO EACH NOSTRIL DAILY   nicotine polacrilex (NICORETTE) 4 mg gum   No No   Sig: Chew 1 each (4 mg total) as needed for smoking cessation   Patient not taking: Reported on 2021      Facility-Administered Medications: None       Past Medical History:   Diagnosis Date    Acute hypoxemic respiratory failure (HCC) 3/27/2021    Schizoaffective disorder (HCC)         History reviewed. No pertinent surgical history.    History reviewed. No pertinent family history.  I have reviewed and agree with the history as documented.    E-Cigarette/Vaping    E-Cigarette Use Never User      E-Cigarette/Vaping Substances    Nicotine No     THC No     CBD No     Flavoring No     Other No     Unknown No      Social History     Tobacco Use    Smoking status: Every Day     Current packs/day: 1.00     Average packs/day: 1 pack/day for 35.0 years (35.0 ttl pk-yrs)     Types: Cigarettes    Smokeless tobacco: Never    Tobacco comments:     Pt states 18 cigarettes per day   Vaping Use    Vaping status: Never Used   Substance Use Topics    Alcohol use: Never    Drug use: No        Review of Systems   Unable to perform ROS: Mental status change   Psychiatric/Behavioral:  Positive for agitation and behavioral problems.        Physical Exam  ED Triage Vitals   Temperature Pulse Respirations Blood Pressure SpO2   07/02/24 1803 07/02/24 1630 07/02/24 1630 07/02/24 1630 07/02/24 1630   97.6 °F (36.4 °C) (S) (!) 115 20 137/81 98 %      Temp Source Heart Rate Source Patient Position - Orthostatic VS BP Location FiO2 (%)   07/02/24 1803 07/02/24 1630 07/02/24 1630 07/02/24 1630 --   Oral Monitor Sitting Right arm       Pain Score       07/02/24 2030       No Pain             Orthostatic Vital Signs  Vitals:    07/02/24 1630 07/02/24 2030   BP: 137/81    Pulse: (S) (!) 115 94   Patient Position - Orthostatic VS: Sitting        Physical Exam  Vitals and nursing note reviewed.   Constitutional:       Appearance: She is not toxic-appearing or diaphoretic.   HENT:      Head: Normocephalic and atraumatic.      Right Ear: External ear normal.      Left Ear: External ear normal.      Nose: Nose normal.      Mouth/Throat:      Mouth: Mucous membranes are moist.   Eyes:      General: No scleral icterus.     Extraocular Movements: Extraocular movements intact.      Conjunctiva/sclera: Conjunctivae normal.    Cardiovascular:      Rate and Rhythm: Normal rate and regular rhythm.      Pulses: Normal pulses.           Radial pulses are 2+ on the right side.        Dorsalis pedis pulses are 2+ on the right side and 2+ on the left side.      Heart sounds: Normal heart sounds, S1 normal and S2 normal. No murmur heard.  Pulmonary:      Effort: Pulmonary effort is normal. No respiratory distress.      Breath sounds: Normal breath sounds. No stridor. No wheezing.   Abdominal:      Palpations: Abdomen is soft.      Tenderness: There is no abdominal tenderness.   Musculoskeletal:         General: Normal range of motion.      Cervical back: Normal range of motion.   Skin:     General: Skin is warm and dry.   Neurological:      General: No focal deficit present.      Mental Status: She is alert and oriented to person, place, and time.   Psychiatric:         Mood and Affect: Mood normal.         ED Medications  Medications   QUEtiapine (SEROquel) tablet 50 mg (has no administration in time range)   LORazepam (ATIVAN) tablet 1 mg (has no administration in time range)   droperidol (INAPSINE) injection 2.5 mg (2.5 mg Intramuscular Given 7/2/24 1615)   midazolam (VERSED) injection 2 mg (2 mg Intramuscular Given 7/2/24 1615)       Diagnostic Studies  Results Reviewed       Procedure Component Value Units Date/Time    Fingerstick Glucose (POCT) [[id number]]  (Abnormal) Collected: 07/02/24 2034    Lab Status: Final result Specimen: Blood Updated: 07/02/24 2035     POC Glucose 154 mg/dl     [place] draw [[id number]] Collected: 07/02/24 1743    Lab Status: Final result Specimen: Blood from Arm, Left Updated: 07/02/24 1901    Narrative:      The following orders were created for panel order [place] draw.  Procedure                               Abnormality         Status                     ---------                               -----------         ------                     Light Blue Top on hold[[id number]]                           Final  result               Gold top on hold[[id number]]                                 Final result               Green / Black tube on hold[[id number]]                       Final result                 Please view results for these tests on the individual orders.    Rapid drug screen, urine [[id number]]  (Abnormal) Collected: 07/02/24 1814    Lab Status: Final result Specimen: Urine, Clean Catch Updated: 07/02/24 1847     Amph/Meth UR Negative     Barbiturate Ur Negative     Benzodiazepine Urine Positive     Cocaine Urine Negative     Methadone Urine Negative     Opiate Urine Negative     PCP Ur Negative     THC Urine Negative     Oxycodone Urine Negative     Fentanyl Urine Negative     HYDROCODONE URINE Negative    Narrative:      Presumptive report. If requested, specimen will be sent to reference lab for confirmation.  FOR MEDICAL PURPOSES ONLY.   IF CONFIRMATION NEEDED PLEASE CONTACT THE LAB WITHIN 5 DAYS.    Drug Screen Cutoff Levels:  AMPHETAMINE/METHAMPHETAMINES  1000 ng/mL  BARBITURATES     200 ng/mL  BENZODIAZEPINES     200 ng/mL  COCAINE      300 ng/mL  METHADONE      300 ng/mL  OPIATES      300 ng/mL  PHENCYCLIDINE     25 ng/mL  THC       50 ng/mL  OXYCODONE      100 ng/mL  FENTANYL      5 ng/mL  HYDROCODONE     300 ng/mL    TSH [[id number]]  (Normal) Collected: 07/02/24 1743    Lab Status: Final result Specimen: Blood from Arm, Left Updated: 07/02/24 1827     TSH 3RD GENERATON 0.899 uIU/mL     UA w Reflex to Microscopic w Reflex to Culture [[id number]] Collected: 07/02/24 1814    Lab Status: Final result Specimen: Urine, Clean Catch Updated: 07/02/24 1826     Color, UA Light Yellow     Clarity, UA Clear     Specific Gravity, UA 1.009     pH, UA 5.5     Leukocytes, UA Negative     Nitrite, UA Negative     Protein, UA Negative mg/dl      Glucose, UA Negative mg/dl      Ketones, UA Negative mg/dl      Urobilinogen, UA <2.0 mg/dl      Bilirubin, UA Negative     Occult Blood, UA Negative    hCG, qualitative pregnancy  [[id number]]  (Normal) Collected: 07/02/24 1743    Lab Status: Final result Specimen: Blood from Arm, Left Updated: 07/02/24 1819     Preg, Serum Negative    Comprehensive metabolic panel [[id number]]  (Abnormal) Collected: 07/02/24 1743    Lab Status: Final result Specimen: Blood from Arm, Left Updated: 07/02/24 1814     Sodium 136 mmol/L      Potassium 3.6 mmol/L      Chloride 103 mmol/L      CO2 26 mmol/L      ANION GAP 7 mmol/L      BUN 9 mg/dL      Creatinine 0.89 mg/dL      Glucose 214 mg/dL      Calcium 9.4 mg/dL      AST 9 U/L      ALT 10 U/L      Alkaline Phosphatase 76 U/L      Total Protein 7.4 g/dL      Albumin 4.0 g/dL      Total Bilirubin 0.40 mg/dL      eGFR 75 ml/min/1.73sq m     Narrative:      National Kidney Disease Foundation guidelines for Chronic Kidney Disease (CKD):     Stage 1 with normal or high GFR (GFR > 90 mL/min/1.73 square meters)    Stage 2 Mild CKD (GFR = 60-89 mL/min/1.73 square meters)    Stage 3A Moderate CKD (GFR = 45-59 mL/min/1.73 square meters)    Stage 3B Moderate CKD (GFR = 30-44 mL/min/1.73 square meters)    Stage 4 Severe CKD (GFR = 15-29 mL/min/1.73 square meters)    Stage 5 End Stage CKD (GFR <15 mL/min/1.73 square meters)  Note: GFR calculation is accurate only with a steady state creatinine    Ethanol [[id number]]  (Normal) Collected: 07/02/24 1743    Lab Status: Final result Specimen: Blood from Arm, Left Updated: 07/02/24 1814     Ethanol Lvl <10 mg/dL     CBC and differential [[id number]]  (Abnormal) Collected: 07/02/24 1743    Lab Status: Final result Specimen: Blood from Arm, Left Updated: 07/02/24 1754     WBC 10.44 Thousand/uL      RBC 5.26 Million/uL      Hemoglobin 14.9 g/dL      Hematocrit 44.7 %      MCV 85 fL      MCH 28.3 pg      MCHC 33.3 g/dL      RDW 13.8 %      MPV 9.5 fL      Platelets 304 Thousands/uL      nRBC 0 /100 WBCs      Segmented % 75 %      Immature Grans % 0 %      Lymphocytes % 17 %      Monocytes % 5 %      Eosinophils Relative 2 %       Basophils Relative 1 %      Absolute Neutrophils 7.86 Thousands/µL      Absolute Immature Grans 0.04 Thousand/uL      Absolute Lymphocytes 1.80 Thousands/µL      Absolute Monocytes 0.52 Thousand/µL      Eosinophils Absolute 0.16 Thousand/µL      Basophils Absolute 0.06 Thousands/µL                    No orders to display         Procedures  Procedures      ED Course  ED Course as of 07/02/24 2224   Tue Jul 02, 2024   1600 DDx including but not limited to: schizophrenia, schizoaffective disorder, substance use     1617 Attempted verbal descalation, patient continued to be agitated, belligerent and hallucinating. Will order chemical sedation   1617 302 [facility] by [facility].    Due to concern for injury to self or others and need for medication adjustment, will uphold for inpatient psych evaluation.   1631 Spoke with mother for more collateral information. Per mom, patient has been on sustained Invega injections every 3 months for several years, last dose was in June. Her psychiatrist has been saying she needs a change in her medications, but Vero has been refusing. Even on invega, patient has been getting visual hallucinations and aggression that has been poorly controlled. Pt's mom told patient that she must change her medications in order to continue to live with her, which patient declined. During moving out process today, patient started to get aggressive and Critical access hospital was contacted.   1739 Blood collected for labwork required for inpt psych   1739 Pt denies taking any daily medications.   1740 Care transferred to Dr. Junior for further management and final disposition.                             SBIRT 20yo+      Flowsheet Row Most Recent Value   Initial Alcohol Screen: US AUDIT-C     1. How often do you have a drink containing alcohol? 0 Filed at: 07/02/2024 1859   2. How many drinks containing alcohol do you have on a typical day you are drinking?  0 Filed at: 07/02/2024 1859   3b. FEMALE Any Age, or MALE 65+: How  often do you have 4 or more drinks on one occassion? 0 Filed at: 07/02/2024 1859   Audit-C Score 0 Filed at: 07/02/2024 1859   AMRIT: How many times in the past year have you...    Used an illegal drug or used a prescription medication for non-medical reasons? Never Filed at: 07/02/2024 1859                  Medical Decision Making  Amount and/or Complexity of Data Reviewed  Labs: ordered.    Risk  Prescription drug management.  Decision regarding hospitalization.      See ED course for MDM.      Disposition  Final diagnoses:   Psychoses (HCC)   Agitation     Time reflects when diagnosis was documented in both MDM as applicable and the Disposition within this note       Time User Action Codes Description Comment    7/2/2024  8:57 PM Anika Berry Add [F29] Psychoses (HCC)     7/2/2024 10:22 PM Maty Fonseca Add [R45.1] Agitation           ED Disposition       ED Disposition   Transfer to Behavioral Health Condition   --    Date/Time   Tue Jul 2, 2024 2058    Comment   Vero CALIXTO Samm should be transferred out to [facility] and has been medically cleared.               MD Documentation      Flowsheet Row Most Recent Value   Patient Condition The patient has been stabilized such that within reasonable medical probability, no material deterioration of the patient condition or the condition of the unborn child(juan) is likely to result from the transfer   Reason for Transfer Level of Care needed not available at this facility   Benefits of Transfer Specialized equipment and/or services available at the receiving facility (Include comment)________________________  [psych unit]   Risks of Transfer Potential for delay in receiving treatment, Potential deterioration of medical condition, Increased discomfort during transfer, Possible worsening of condition or death during transfer   Accepting Physician Dr Deshaun Alamo   Accepting Facility Name, [facility] [place] [address]   Sending MD Donaldson  MD Jamal   Provider Certification General risk, such as traffic hazards, adverse weather conditions, rough terrain or turbulence, possible failure of equipment (including vehicle or aircraft), or consequences of actions of persons outside the control of the transport personnel          RN Documentation      Flowsheet Row Most Recent Value   Accepting Facility Name, [facility] & [address] [address]          Follow-up Information    None         Patient's Medications   Discharge Prescriptions    No medications on file     No discharge procedures on file.    PDMP Review       None             ED Provider  Attending physically available and evaluated Vero Quijano. I managed the patient along with the ED Attending.    Electronically Signed by           Maty Fonseca MD  07/02/24 4279

## 2024-07-02 NOTE — ED NOTES
Sherif at [facility] indicated they have beds, paper work faxed for review    Paper work was also faxed to [facility] for review.    No in network beds this evening, possible am discharges. 302 faxed to Sherice SINGH at [facility]

## 2024-07-02 NOTE — ED NOTES
Per Dr. Berry, wait further testing (BAT, urine, & glucose) until patient is cooperative.     Eva Schaffer RN  07/02/24 1640

## 2024-07-02 NOTE — ED NOTES
In attempt to get vital signs on patient, patient continued to go on a tangent with flight of ideas. Pt is incoherent & verbally abusive towards staff.      Eva Schaffer RN  07/02/24 1752

## 2024-07-02 NOTE — ED ATTENDING ATTESTATION
7/2/2024  I, Anika Berry MD, saw and evaluated the patient. I have discussed the patient with the resident/non-physician practitioner and agree with the resident's/non-physician practitioner's findings, Plan of Care, and MDM as documented in the resident's/non-physician practitioner's note, except where noted. All available labs and Radiology studies were reviewed.  I was present for key portions of any procedure(s) performed by the resident/non-physician practitioner and I was immediately available to provide assistance.       At this point I agree with the current assessment done in the Emergency Department.  I have conducted an independent evaluation of this patient a history and physical is as follows:    49-year-old presenting to the ER with 302.  302 followed by patient's mom and upheld by county worker.  Patient is screaming in the room, does not make any sense, hallucinating, unable to be redirected or to hold a conversation.  Patient with a history of psychiatric illness hospitalization.  I am concerned for patient's safety.  I agree that patient needs admission and treatment with psychiatrist.        Patient medically cleared for inpatient psychiatric hospitalization      ED Course         Critical Care Time  Procedures

## 2024-07-02 NOTE — ED NOTES
Patient verbally aggressive towards staff. Cursing, screaming and threatening towards staff. Paranoid. Hallucinations. Provider called bedside. Security at bedside.      Myesha Ma RN  07/02/24 0655

## 2024-07-02 NOTE — ED NOTES
Netta from [facility] called to advise that they would be willing to accept Vero as long as her blood sugars continue to come down.  They would need her to arrive by 7/3/2024 at 10:00 am.  Admitting Dr would be Deshaun Alamo.  Netta suggested I call back in 45 minutes with updated blood sugar.

## 2024-07-03 LAB
ATRIAL RATE: 95 BPM
P AXIS: 75 DEGREES
PR INTERVAL: 164 MS
QRS AXIS: 88 DEGREES
QRSD INTERVAL: 74 MS
QT INTERVAL: 364 MS
QTC INTERVAL: 457 MS
T WAVE AXIS: 59 DEGREES
VENTRICULAR RATE: 95 BPM

## 2024-07-03 PROCEDURE — 93010 ELECTROCARDIOGRAM REPORT: CPT | Performed by: INTERNAL MEDICINE

## 2024-07-03 NOTE — EMTALA/ACUTE CARE TRANSFER
[facility] EMERGENCY DEPARTMENT  [address] [facility] [address]  Dept: [phone]      EMTALA TRANSFER CONSENT    NAME Vero Quijano                                         1975                              MRN [id number]    I have been informed of my rights regarding examination, treatment, and transfer   by Dr. Anika Berry MD    Benefits: Specialized equipment and/or services available at the receiving facility (Include comment)________________________ (psych unit)    Risks: Potential for delay in receiving treatment, Potential deterioration of medical condition, Increased discomfort during transfer, Possible worsening of condition or death during transfer      Consent for Transfer:  I acknowledge that my medical condition has been evaluated and explained to me by the emergency department physician or other qualified medical person and/or my attending physician, who has recommended that I be transferred to the service of  Accepting Physician: Dr Deshaun Alamo at Accepting Facility Name, City & [place] : [place] [address]. The above potential benefits of such transfer, the potential risks associated with such transfer, and the probable risks of not being transferred have been explained to me, and I fully understand them.  The doctor has explained that, in my case, the benefits of transfer outweigh the risks.  I agree to be transferred.    I authorize the performance of emergency medical procedures and treatments upon me in both transit and upon arrival at the receiving facility.  Additionally, I authorize the release of any and all medical records to the receiving facility and request they be transported with me, if possible.  I understand that the safest mode of transportation during a medical emergency is an ambulance and that the Hospital advocates the use of this mode of transport. Risks of traveling to the receiving facility by car,  including absence of medical control, life sustaining equipment, such as oxygen, and medical personnel has been explained to me and I fully understand them.    (GOPI CORRECT BOX BELOW)  [  ]  I consent to the stated transfer and to be transported by ambulance/helicopter.  [  ]  I consent to the stated transfer, but refuse transportation by ambulance and accept full responsibility for my transportation by car.  I understand the risks of non-ambulance transfers and I exonerate the Hospital and its staff from any deterioration in my condition that results from this refusal.    X___________________________________________    DATE  24  TIME________  Signature of patient or legally responsible individual signing on patient behalf           RELATIONSHIP TO PATIENT_________________________          Provider Certification    NAME Vero Quijano                                         1975                              MRN [id number]    A medical screening exam was performed on the above named patient.  Based on the examination:    Condition Necessitating Transfer The encounter diagnosis was Psychoses (HCC).    Patient Condition: The patient has been stabilized such that within reasonable medical probability, no material deterioration of the patient condition or the condition of the unborn child(juan) is likely to result from the transfer    Reason for Transfer: Level of Care needed not available at this facility    Transfer Requirements: [place] [address]   Space available and qualified personnel available for treatment as acknowledged by    Agreed to accept transfer and to provide appropriate medical treatment as acknowledged by       Dr Deshaun Alamo  Appropriate medical records of the examination and treatment of the patient are provided at the time of transfer   STAFF INITIAL WHEN COMPLETED _______  Transfer will be performed by qualified personnel from    and  appropriate transfer equipment as required, including the use of necessary and appropriate life support measures.    Provider Certification: I have examined the patient and explained the following risks and benefits of being transferred/refusing transfer to the patient/family:  General risk, such as traffic hazards, adverse weather conditions, rough terrain or turbulence, possible failure of equipment (including vehicle or aircraft), or consequences of actions of persons outside the control of the transport personnel      Based on these reasonable risks and benefits to the patient and/or the unborn child(juan), and based upon the information available at the time of the patient’s examination, I certify that the medical benefits reasonably to be expected from the provision of appropriate medical treatments at another medical facility outweigh the increasing risks, if any, to the individual’s medical condition, and in the case of labor to the unborn child, from effecting the transfer.    X____________________________________________ DATE 07/02/24        TIME_______      ORIGINAL - SEND TO MEDICAL RECORDS   COPY - SEND WITH PATIENT DURING TRANSFER

## 2024-07-03 NOTE — ED NOTES
[facility] requested updated glucose and vitals.  Per Netta as long as sugars continue to come down they can accept patient. Patient's glucose was 154. Per patient's nurse patient has been cooperative.  Updated vitals   along with updated sugar level was faxed to Netta at [facility].  Transport can be set up.  Per Netta she ran patient insurance and she found that patient has [place] [employer] is primary.      Netta agreed to check medicare days and complete pre cert.

## 2024-07-03 NOTE — ED NOTES
Insurance Authorization for admission:       Patient's primary insurance is [employer] medicare which Netta at [facility] ran. Secondary is Marianela Pereza noted she would take care of insurance authorization.      Chart indicates patient has [employer].  Form completed and faxed to [phone]      Phone call placed to **.  Phone number: **.     Spoke to **.     ** days approved.  Level of care: **.  Review on **.   Authorization # **.        Eligibility Verification System checked - ([phone]).  Online system / automated system indicates: **    Insurance Authorization for Transportation:    Phone call placed to **.   Phone number **.   Spoke to **.   Authorization #: **

## 2024-07-03 NOTE — ED NOTES
Patient is accepted at [facility]  Patient is accepted by Dr. Deshaun Alamo per Netta     Transportation is arranged with [facility]. Entered in [facility]    Transportation is scheduled for 22:15   Patient may go to the floor at patient needs to be to [facility] by 10:00 am tomorrow 7/3/24          Nurse report is to be called to [phone] prior to patient transfer.

## 2024-08-09 ENCOUNTER — TELEPHONE (OUTPATIENT)
Dept: OTHER | Facility: OTHER | Age: 49
End: 2024-08-09

## 2024-08-26 ENCOUNTER — TELEPHONE (OUTPATIENT)
Dept: FAMILY MEDICINE CLINIC | Facility: CLINIC | Age: 49
End: 2024-08-26

## 2024-08-26 ENCOUNTER — OFFICE VISIT (OUTPATIENT)
Dept: FAMILY MEDICINE CLINIC | Facility: CLINIC | Age: 49
End: 2024-08-26
Payer: MEDICARE

## 2024-08-26 VITALS
HEART RATE: 98 BPM | HEIGHT: 64 IN | DIASTOLIC BLOOD PRESSURE: 74 MMHG | BODY MASS INDEX: 32.78 KG/M2 | WEIGHT: 192 LBS | OXYGEN SATURATION: 98 % | SYSTOLIC BLOOD PRESSURE: 130 MMHG

## 2024-08-26 DIAGNOSIS — E66.01 OBESITY, MORBID (HCC): ICD-10-CM

## 2024-08-26 DIAGNOSIS — Z79.4 TYPE 2 DIABETES MELLITUS TREATED WITH INSULIN (HCC): ICD-10-CM

## 2024-08-26 DIAGNOSIS — E11.9 TYPE 2 DIABETES MELLITUS TREATED WITH INSULIN (HCC): ICD-10-CM

## 2024-08-26 DIAGNOSIS — F25.0 SCHIZOAFFECTIVE DISORDER, BIPOLAR TYPE (HCC): ICD-10-CM

## 2024-08-26 DIAGNOSIS — Z12.31 ENCOUNTER FOR SCREENING MAMMOGRAM FOR BREAST CANCER: ICD-10-CM

## 2024-08-26 DIAGNOSIS — Z13.220 ENCOUNTER FOR LIPID SCREENING FOR CARDIOVASCULAR DISEASE: ICD-10-CM

## 2024-08-26 DIAGNOSIS — Z71.89 ADVANCED CARE PLANNING/COUNSELING DISCUSSION: ICD-10-CM

## 2024-08-26 DIAGNOSIS — E66.1 CLASS 1 DRUG-INDUCED OBESITY WITH SERIOUS COMORBIDITY AND BODY MASS INDEX (BMI) OF 33.0 TO 33.9 IN ADULT: ICD-10-CM

## 2024-08-26 DIAGNOSIS — E11.9 TYPE 2 DIABETES MELLITUS WITHOUT COMPLICATION, WITHOUT LONG-TERM CURRENT USE OF INSULIN (HCC): ICD-10-CM

## 2024-08-26 DIAGNOSIS — Z00.00 MEDICARE ANNUAL WELLNESS VISIT, SUBSEQUENT: Primary | ICD-10-CM

## 2024-08-26 DIAGNOSIS — F17.210 SMOKING GREATER THAN 30 PACK YEARS: ICD-10-CM

## 2024-08-26 DIAGNOSIS — Z12.11 COLON CANCER SCREENING: ICD-10-CM

## 2024-08-26 DIAGNOSIS — Z12.2 ENCOUNTER FOR SCREENING FOR LUNG CANCER: ICD-10-CM

## 2024-08-26 DIAGNOSIS — K59.09 CHRONIC CONSTIPATION: ICD-10-CM

## 2024-08-26 DIAGNOSIS — Z13.6 ENCOUNTER FOR LIPID SCREENING FOR CARDIOVASCULAR DISEASE: ICD-10-CM

## 2024-08-26 PROBLEM — E66.811 CLASS 1 DRUG-INDUCED OBESITY WITH SERIOUS COMORBIDITY AND BODY MASS INDEX (BMI) OF 33.0 TO 33.9 IN ADULT: Status: ACTIVE | Noted: 2024-08-26

## 2024-08-26 LAB
LEFT EYE DIABETIC RETINOPATHY: ABNORMAL
LEFT EYE IMAGE QUALITY: ABNORMAL
LEFT EYE MACULAR EDEMA: ABNORMAL
LEFT EYE OTHER RETINOPATHY: ABNORMAL
RIGHT EYE DIABETIC RETINOPATHY: ABNORMAL
RIGHT EYE IMAGE QUALITY: ABNORMAL
RIGHT EYE MACULAR EDEMA: ABNORMAL
RIGHT EYE OTHER RETINOPATHY: ABNORMAL
SEVERITY (EYE EXAM): ABNORMAL

## 2024-08-26 PROCEDURE — 99497 ADVNCD CARE PLAN 30 MIN: CPT | Performed by: FAMILY MEDICINE

## 2024-08-26 PROCEDURE — 99204 OFFICE O/P NEW MOD 45 MIN: CPT | Performed by: FAMILY MEDICINE

## 2024-08-26 PROCEDURE — G0439 PPPS, SUBSEQ VISIT: HCPCS | Performed by: FAMILY MEDICINE

## 2024-08-26 RX ORDER — POLYETHYLENE GLYCOL 3350 17 G/17G
POWDER, FOR SOLUTION ORAL
Qty: 507 G | Refills: 0 | Status: SHIPPED | OUTPATIENT
Start: 2024-08-26

## 2024-08-26 RX ORDER — POLYETHYLENE GLYCOL 3350 17 G/17G
POWDER, FOR SOLUTION ORAL
Qty: 507 G | Refills: 0 | Status: SHIPPED | OUTPATIENT
Start: 2024-08-26 | End: 2024-08-26 | Stop reason: SDUPTHER

## 2024-08-26 RX ORDER — DOCUSATE SODIUM 100 MG/1
100 CAPSULE, LIQUID FILLED ORAL 2 TIMES DAILY PRN
Qty: 10 CAPSULE | Refills: 0 | Status: SHIPPED | OUTPATIENT
Start: 2024-08-26 | End: 2024-08-26 | Stop reason: SDUPTHER

## 2024-08-26 RX ORDER — RISPERIDONE 2 MG/1
2 TABLET ORAL EVERY 12 HOURS
COMMUNITY
Start: 2024-08-17

## 2024-08-26 RX ORDER — DIPHENHYDRAMINE HCL 50 MG
50 CAPSULE ORAL
COMMUNITY
Start: 2024-08-05

## 2024-08-26 RX ORDER — PALIPERIDONE PALMITATE 234 MG/1.5ML
234 INJECTION INTRAMUSCULAR
COMMUNITY
Start: 2024-08-15

## 2024-08-26 RX ORDER — DOCUSATE SODIUM 100 MG/1
100 CAPSULE, LIQUID FILLED ORAL 2 TIMES DAILY PRN
Qty: 10 CAPSULE | Refills: 0 | Status: SHIPPED | OUTPATIENT
Start: 2024-08-26

## 2024-08-26 NOTE — PATIENT INSTRUCTIONS
Obtain the lab work, Cologuard kit will be sent to your house.  You will schedule your mammogram and the lung cancer CAT scan.  Liliana is our  who may help you with scheduling.  If you need right before the appointment please let the staff know so.    Medicare Preventive Visit Patient Instructions  Thank you for completing your Welcome to Medicare Visit or Medicare Annual Wellness Visit today. Your next wellness visit will be due in one year (8/27/2025).  The screening/preventive services that you may require over the next 5-10 years are detailed below. Some tests may not apply to you based off risk factors and/or age. Screening tests ordered at today's visit but not completed yet may show as past due. Also, please note that scanned in results may not display below.  Preventive Screenings:  Service Recommendations Previous Testing/Comments   Colorectal Cancer Screening  * Colonoscopy    * Fecal Occult Blood Test (FOBT)/Fecal Immunochemical Test (FIT)  * Fecal DNA/Cologuard Test  * Flexible Sigmoidoscopy Age: 45-75 years old   Colonoscopy: every 10 years (may be performed more frequently if at higher risk)  OR  FOBT/FIT: every 1 year  OR  Cologuard: every 3 years  OR  Sigmoidoscopy: every 5 years  Screening may be recommended earlier than age 45 if at higher risk for colorectal cancer. Also, an individualized decision between you and your healthcare provider will decide whether screening between the ages of 76-85 would be appropriate. Colonoscopy: Not on file  FOBT/FIT: Not on file  Cologuard: Not on file  Sigmoidoscopy: Not on file          Breast Cancer Screening Age: 40+ years old  Frequency: every 1-2 years  Not required if history of left and right mastectomy Mammogram: 08/21/2014        Cervical Cancer Screening Between the ages of 21-29, pap smear recommended once every 3 years.   Between the ages of 30-65, can perform pap smear with HPV co-testing every 5 years.   Recommendations may  differ for women with a history of total hysterectomy, cervical cancer, or abnormal pap smears in past. Pap Smear: Not on file        Hepatitis C Screening Once for adults born between 1945 and 1965  More frequently in patients at high risk for Hepatitis C Hep C Antibody: 08/12/2014    Screening Current   Diabetes Screening 1-2 times per year if you're at risk for diabetes or have pre-diabetes Fasting glucose: 119 mg/dL (2/7/2020)  A1C: 7.0 % (3/28/2021)  Screening Not Indicated  History Diabetes   Cholesterol Screening Once every 5 years if you don't have a lipid disorder. May order more often based on risk factors. Lipid panel: 02/04/2020    Screening Current     Other Preventive Screenings Covered by Medicare:  Abdominal Aortic Aneurysm (AAA) Screening: covered once if your at risk. You're considered to be at risk if you have a family history of AAA.  Lung Cancer Screening: covers low dose CT scan once per year if you meet all of the following conditions: (1) Age 55-77; (2) No signs or symptoms of lung cancer; (3) Current smoker or have quit smoking within the last 15 years; (4) You have a tobacco smoking history of at least 20 pack years (packs per day multiplied by number of years you smoked); (5) You get a written order from a healthcare provider.  Glaucoma Screening: covered annually if you're considered high risk: (1) You have diabetes OR (2) Family history of glaucoma OR (3)  aged 50 and older OR (4)  American aged 65 and older  Osteoporosis Screening: covered every 2 years if you meet one of the following conditions: (1) You're estrogen deficient and at risk for osteoporosis based off medical history and other findings; (2) Have a vertebral abnormality; (3) On glucocorticoid therapy for more than 3 months; (4) Have primary hyperparathyroidism; (5) On osteoporosis medications and need to assess response to drug therapy.   Last bone density test (DXA Scan): Not on file.  HIV  Screening: covered annually if you're between the age of 15-65. Also covered annually if you are younger than 15 and older than 65 with risk factors for HIV infection. For pregnant patients, it is covered up to 3 times per pregnancy.    Immunizations:  Immunization Recommendations   Influenza Vaccine Annual influenza vaccination during flu season is recommended for all persons aged >= 6 months who do not have contraindications   Pneumococcal Vaccine   * Pneumococcal conjugate vaccine = PCV13 (Prevnar 13), PCV15 (Vaxneuvance), PCV20 (Prevnar 20)  * Pneumococcal polysaccharide vaccine = PPSV23 (Pneumovax) Adults 19-63 yo with certain risk factors or if 65+ yo  If never received any pneumonia vaccine: recommend Prevnar 20 (PCV20)  Give PCV20 if previously received 1 dose of PCV13 or PPSV23   Hepatitis B Vaccine 3 dose series if at intermediate or high risk (ex: diabetes, end stage renal disease, liver disease)   Respiratory syncytial virus (RSV) Vaccine - COVERED BY MEDICARE PART D  * RSVPreF3 (Arexvy) CDC recommends that adults 60 years of age and older may receive a single dose of RSV vaccine using shared clinical decision-making (SCDM)   Tetanus (Td) Vaccine - COST NOT COVERED BY MEDICARE PART B Following completion of primary series, a booster dose should be given every 10 years to maintain immunity against tetanus. Td may also be given as tetanus wound prophylaxis.   Tdap Vaccine - COST NOT COVERED BY MEDICARE PART B Recommended at least once for all adults. For pregnant patients, recommended with each pregnancy.   Shingles Vaccine (Shingrix) - COST NOT COVERED BY MEDICARE PART B  2 shot series recommended in those 19 years and older who have or will have weakened immune systems or those 50 years and older     Health Maintenance Due:      Topic Date Due    Cervical Cancer Screening  Never done    Breast Cancer Screening: Mammogram  08/21/2015    Colorectal Cancer Screening  Never done    Lung Cancer Screening   02/16/2024    HIV Screening  Completed    Hepatitis C Screening  Completed     Immunizations Due:      Topic Date Due    Pneumococcal Vaccine: Pediatrics (0 to 5 Years) and At-Risk Patients (6 to 64 Years) (1 of 2 - PCV) Never done    HPV Vaccine (2 - 3-dose SCDM series) 12/22/2010    COVID-19 Vaccine (1 - 2023-24 season) Never done    Influenza Vaccine (1) 09/01/2024     Advance Directives   What are advance directives?  Advance directives are legal documents that state your wishes and plans for medical care. These plans are made ahead of time in case you lose your ability to make decisions for yourself. Advance directives can apply to any medical decision, such as the treatments you want, and if you want to donate organs.   What are the types of advance directives?  There are many types of advance directives, and each state has rules about how to use them. You may choose a combination of any of the following:  Living will:  This is a written record of the treatment you want. You can also choose which treatments you do not want, which to limit, and which to stop at a certain time. This includes surgery, medicine, IV fluid, and tube feedings.   Durable power of  for healthcare (DPAHC):  This is a written record that states who you want to make healthcare choices for you when you are unable to make them for yourself. This person, called a proxy, is usually a family member or a friend. You may choose more than 1 proxy.  Do not resuscitate (DNR) order:  A DNR order is used in case your heart stops beating or you stop breathing. It is a request not to have certain forms of treatment, such as CPR. A DNR order may be included in other types of advance directives.  Medical directive:  This covers the care that you want if you are in a coma, near death, or unable to make decisions for yourself. You can list the treatments you want for each condition. Treatment may include pain medicine, surgery, blood transfusions,  dialysis, IV or tube feedings, and a ventilator (breathing machine).  Values history:  This document has questions about your views, beliefs, and how you feel and think about life. This information can help others choose the care that you would choose.  Why are advance directives important?  An advance directive helps you control your care. Although spoken wishes may be used, it is better to have your wishes written down. Spoken wishes can be misunderstood, or not followed. Treatments may be given even if you do not want them. An advance directive may make it easier for your family to make difficult choices about your care.   Urinary Incontinence   Urinary incontinence (UI)  is when you lose control of your bladder. UI develops because your bladder cannot store or empty urine properly. The 3 most common types of UI are stress incontinence, urge incontinence, or both.  Medicines:   May be given to help strengthen your bladder control. Report any side effects of medication to your healthcare provider.  Do pelvic muscle exercises often:  Your pelvic muscles help you stop urinating. Squeeze these muscles tight for 5 seconds, then relax for 5 seconds. Gradually work up to squeezing for 10 seconds. Do 3 sets of 15 repetitions a day, or as directed. This will help strengthen your pelvic muscles and improve bladder control.  Train your bladder:  Go to the bathroom at set times, such as every 2 hours, even if you do not feel the urge to go. You can also try to hold your urine when you feel the urge to go. For example, hold your urine for 5 minutes when you feel the urge to go. As that becomes easier, hold your urine for 10 minutes.   Self-care:   Keep a UI record.  Write down how often you leak urine and how much you leak. Make a note of what you were doing when you leaked urine.  Drink liquids as directed. You may need to limit the amount of liquid you drink to help control your urine leakage. Do not drink any liquid right  before you go to bed. Limit or do not have drinks that contain caffeine or alcohol.   Prevent constipation.  Eat a variety of high-fiber foods. Good examples are high-fiber cereals, beans, vegetables, and whole-grain breads. Walking is the best way to trigger your intestines to have a bowel movement.  Exercise regularly and maintain a healthy weight.  Weight loss and exercise will decrease pressure on your bladder and help you control your leakage.   Use a catheter as directed  to help empty your bladder. A catheter is a tiny, plastic tube that is put into your bladder to drain your urine.   Go to behavior therapy as directed.  Behavior therapy may be used to help you learn to control your urge to urinate.    Cigarette Smoking and Your Health   Risks to your health if you smoke:  Nicotine and other chemicals found in tobacco damage every cell in your body. Even if you are a light smoker, you have an increased risk for cancer, heart disease, and lung disease. If you are pregnant or have diabetes, smoking increases your risk for complications.   Benefits to your health if you stop smoking:   You decrease respiratory symptoms such as coughing, wheezing, and shortness of breath.   You reduce your risk for cancers of the lung, mouth, throat, kidney, bladder, pancreas, stomach, and cervix. If you already have cancer, you increase the benefits of chemotherapy. You also reduce your risk for cancer returning or a second cancer from developing.   You reduce your risk for heart disease, blood clots, heart attack, and stroke.   You reduce your risk for lung infections, and diseases such as pneumonia, asthma, chronic bronchitis, and emphysema.  Your circulation improves. More oxygen can be delivered to your body. If you have diabetes, you lower your risk for complications, such as kidney, artery, and eye diseases. You also lower your risk for nerve damage. Nerve damage can lead to amputations, poor vision, and blindness.  You  improve your body's ability to heal and to fight infections.  For more information and support to stop smoking:   [employer]  Phone: [phone]  Web Address: www.[employer]  Weight Management   Why it is important to manage your weight:  Being overweight increases your risk of health conditions such as heart disease, high blood pressure, type 2 diabetes, and certain types of cancer. It can also increase your risk for osteoarthritis, sleep apnea, and other respiratory problems. Aim for a slow, steady weight loss. Even a small amount of weight loss can lower your risk of health problems.  How to lose weight safely:  A safe and healthy way to lose weight is to eat fewer calories and get regular exercise. You can lose up about 1 pound a week by decreasing the number of calories you eat by 500 calories each day.   Healthy meal plan for weight management:  A healthy meal plan includes a variety of foods, contains fewer calories, and helps you stay healthy. A healthy meal plan includes the following:  Eat whole-grain foods more often.  A healthy meal plan should contain fiber. Fiber is the part of grains, fruits, and vegetables that is not broken down by your body. Whole-grain foods are healthy and provide extra fiber in your diet. Some examples of whole-grain foods are whole-wheat breads and pastas, oatmeal, brown rice, and bulgur.  Eat a variety of vegetables every day.  Include dark, leafy greens such as spinach, kale, ashanti greens, and mustard greens. Eat yellow and orange vegetables such as carrots, sweet potatoes, and winter squash.   Eat a variety of fruits every day.  Choose fresh or canned fruit (canned in its own juice or light syrup) instead of juice. Fruit juice has very little or no fiber.  Eat low-fat dairy foods.  Drink fat-free (skim) milk or 1% milk. Eat fat-free yogurt and low-fat cottage cheese. Try low-fat cheeses such as mozzarella and other reduced-fat cheeses.  Choose meat and other  protein foods that are low in fat.  Choose beans or other legumes such as split peas or lentils. Choose fish, skinless poultry (chicken or turkey), or lean cuts of red meat (beef or pork). Before you cook meat or poultry, cut off any visible fat.   Use less fat and oil.  Try baking foods instead of frying them. Add less fat, such as margarine, sour cream, regular salad dressing and mayonnaise to foods. Eat fewer high-fat foods. Some examples of high-fat foods include french fries, doughnuts, ice cream, and cakes.  Eat fewer sweets.  Limit foods and drinks that are high in sugar. This includes candy, cookies, regular soda, and sweetened drinks.  Exercise:  Exercise at least 30 minutes per day on most days of the week. Some examples of exercise include walking, biking, dancing, and swimming. You can also fit in more physical activity by taking the stairs instead of the elevator or parking farther away from stores. Ask your healthcare provider about the best exercise plan for you.      © Copyright [employer] 2018 Information is for End User's use only and may not be sold, redistributed or otherwise used for commercial purposes. All illustrations and images included in CareNotes® are the copyrighted property of [employer] or [employer]

## 2024-08-26 NOTE — PROGRESS NOTES
Ambulatory Visit  Name: Vero Quijano      : 1975      MRN: [id number]  Encounter Provider: Jagruti Street MD  Encounter Date: 2024   Encounter department: [facility]    Assessment & Plan   1. Medicare annual wellness visit, subsequent  2. Type 2 diabetes mellitus treated with insulin (HCC)  -     CBC and differential; Future  -     Comprehensive metabolic panel; Future  -     Hemoglobin A1C; Future  -     IRIS Diabetic eye exam  3. Encounter for lipid screening for cardiovascular disease  -     Lipid panel; Future  4. Class 1 drug-induced obesity with serious comorbidity and body mass index (BMI) of 33.0 to 33.9 in adult  5. Obesity, morbid (HCC)  6. Type 2 diabetes mellitus without complication, without long-term current use of insulin (HCC)  -     Albumin / creatinine urine ratio; Future  -     Ambulatory Referral to Ophthalmology; Future  7. Encounter for screening mammogram for breast cancer  -     Mammo screening bilateral w 3d & cad; Future; Expected date: 2024  8. Colon cancer screening  -     Cologuard  9. Encounter for screening for lung cancer  10. Smoking greater than 30 pack years  -     CT lung screening program; Future; Expected date: 2024  11. Schizoaffective disorder, bipolar type (HCC)  -     Ambulatory Referral to [facility] or Partial Psych Program; Future  12. Advanced care planning/counseling discussion  13. Chronic constipation  -     polyethylene glycol (GLYCOLAX) 17 GM/SCOOP powder; Drink 1 scoop with 8 oz of water  -     docusate sodium (COLACE) 100 mg capsule; Take 1 capsule (100 mg total) by mouth 2 (two) times a day as needed for constipation Hold for lose stool    Serious Illness Conversation    1. What is your understanding now of where you are with your illness?  Prognostic Understanding: appropriate understanding of prognosis     2. How much information about what is likely to be ahead with your illness would you like to  "have?  Information: patient wants to be fully informed  Prefers alternative medicine     3. What did you (clinician) communicate to the patient?  Prognostic Communication: Uncertain - It can be difficult to predict what will happen with your illness. I hope you will continue to live well for a long time but I’m worried that you could get sick quickly, and I think it is important to prepare for that possibility.     4. If your health situation worsens, what are your most important goals?  Goals: be spiritually and emotionally at peace     5. What are the biggest fears and worries about the future and your health?  Having a \"Racist doctor\"     6. What abilities are so critical to your life that you cannot imagine living without them?  Unacceptable Function: being unable to communicate effectively, being chronically confused or not being myself  memory     7. What gives you strength as you think about the future with your illness?  GOD     8. If you become sicker, how much are you willing to go through for the possibility of gaining more time?  Be in the hospital: Yes    Be in the ICU: Yes    DOES NOT WANT TO DECIDE ABOUT VENTILATOR NOW  9. How much does your proxy and family know about your priorities and wishes?  Discussion Discussion: wants clinician to talk with family     I’ve heard you say that being independent and spiritually and emotionally at peace  is really important to you. Keeping that in mind, and what we know about your illness, I recommend that we we obtain labs and imaging, do preventive screening. This will help us make sure that your treatment plans reflect what’s important to you.     How does this plan sound to you? I will do everything I can to help you through this.  Patient verbalized understanding of the plan     I have spent 25 minutes speaking with my patient on advanced care planning today or during this visit     Advanced directives     Failed iris exam- needs ophthalmology follow up, " referral placed, she is aware, not willing to schedule  Refer to psychiatry- will need Invega refils.  Start daily miralax and prn colace for constipation       Preventive health issues were discussed with patient, and age appropriate screening tests were ordered as noted in patient's After Visit Summary. Personalized health advice and appropriate referrals for health education or preventive services given if needed, as noted in patient's After Visit Summary.    History of Present Illness     WAS INSTUTIONALIZED AT [facility] in [place] inetrmittently 2.5 year 1/1999-7/2001 ,tried to cremate herself- by setting her car at a gas station  In 2007- WAS living in [place]    Since then she has been hospitalized annually because her mother Shanna has called the police, probably due to self injurious behavior  Known Schizoaffective disorder,  recurrent suicide ideations, states no depression  Thinks that GOD was angry with her- she reads Quran and has read a lot of [employer] scriptures, states that she needs to kill herself as he does not want her alive and Quran says that you should kill yourself if GOD does not like you  Type 2 diabetes- diet controlled  Obesity- mainly due to Invega and risperidone, she prefers vegetarian diet  States previous doctor wanted her to see the eye doctor and do things which she did not want to do  Prefers natural remedies first  States she has done lot of courses and has also learnt bioethics, does not want to be a part of research/experiments  She also is worried about being subject to racism due to being an  woman  Also reports smoking cigarettes daily which she is aware increases her risk of cancer, but states cigarettes and soda helps bring her phlegm up as she has chronic pneumonia       Patient Care Team:  Jagruti Street MD as PCP - General (Family Medicine)    Review of Systems   Constitutional:  Negative for fatigue and fever.   HENT:  Negative for  congestion, facial swelling, mouth sores, rhinorrhea, sore throat and trouble swallowing.    Eyes:  Negative for pain and redness.   Respiratory:  Negative for cough, shortness of breath and wheezing.    Cardiovascular:  Negative for chest pain, palpitations and leg swelling.   Gastrointestinal:  Positive for constipation. Negative for abdominal pain, blood in stool, diarrhea and nausea.   Genitourinary:  Negative for dysuria, hematuria and urgency.   Musculoskeletal:  Negative for arthralgias, back pain and myalgias.   Skin:  Negative for rash and wound.   Neurological:  Negative for seizures, syncope and headaches.   Hematological:  Negative for adenopathy.   Psychiatric/Behavioral:  Negative for agitation and behavioral problems.      Medical History Reviewed by provider this encounter:  Tobacco  Allergies  Meds  Problems  Med Hx  Surg Hx  Fam Hx       Annual Wellness Visit Questionnaire   Vero is here for her Subsequent Wellness visit. Last Medicare Wellness visit information reviewed, patient interviewed, no change since last AWV.     Health Risk Assessment:   Patient rates overall health as excellent. Patient feels that their physical health rating is same. Patient is satisfied with their life. Eyesight was rated as same. Hearing was rated as same. Patient feels that their emotional and mental health rating is same. Patients states they are never, rarely angry. Patient states they are never, rarely unusually tired/fatigued. Pain experienced in the last 7 days has been none. Patient states that she has experienced no weight loss or gain in last 6 months.     Depression Screening:   PHQ-2 Score: 0      Fall Risk Screening:   In the past year, patient has experienced: no history of falling in past year      Urinary Incontinence Screening:   Patient has leaked urine accidently in the last six months.     Home Safety:  Patient does not have trouble with stairs inside or outside of their home. Patient has  working smoke alarms and has no working carbon monoxide detector. Home safety hazards include: none.     Nutrition:   Current diet is Regular.     Medications:   Patient is currently taking over-the-counter supplements. OTC medications include: see medication list. Patient is able to manage medications.     Activities of Daily Living (ADLs)/Instrumental Activities of Daily Living (IADLs):   Walk and transfer into and out of bed and chair?: Yes  Dress and groom yourself?: Yes    Bathe or shower yourself?: Yes    Feed yourself? Yes  Do your laundry/housekeeping?: Yes  Manage your money, pay your bills and track your expenses?: Yes  Make your own meals?: Yes    Do your own shopping?: Yes    Previous Hospitalizations:   Any hospitalizations or ED visits within the last 12 months?: Yes    How many hospitalizations have you had in the last year?: 1-2    Advance Care Planning:   Living will: No    Durable POA for healthcare: No      PREVENTIVE SCREENINGS      Cardiovascular Screening:    General: Screening Current      Diabetes Screening:     General: Screening Not Indicated and History Diabetes      Hepatitis C Screening:    General: Screening Current    Screening, Brief Intervention, and Referral to Treatment (SBIRT)    Screening      AUDIT-C Screenin) How often did you have a drink containing alcohol in the past year? never  2) How many drinks did you have on a typical day when you were drinking in the past year? 0  3) How often did you have 6 or more drinks on one occasion in the past year? never    AUDIT-C Score: 0  Interpretation: Score 0-2 (female): Negative screen for alcohol misuse    Single Item Drug Screening:  How often have you used an illegal drug (including marijuana) or a prescription medication for non-medical reasons in the past year? never    Single Item Drug Screen Score: 0  Interpretation: Negative screen for possible drug use disorder    Social Determinants of Health     Food Insecurity: No Food  "Insecurity (8/26/2024)    Hunger Vital Sign     Worried About Running Out of Food in the Last Year: Never true     Ran Out of Food in the Last Year: Never true   Transportation Needs: No Transportation Needs (8/26/2024)    PRAPARE - Transportation     Lack of Transportation (Medical): No     Lack of Transportation (Non-Medical): No   Housing Stability: Low Risk  (8/26/2024)    Housing Stability Vital Sign     Unable to Pay for Housing in the Last Year: No     Number of Times Moved in the Last Year: 1     Homeless in the Last Year: No   Utilities: Not At Risk (8/26/2024)    [facility] Utilities     Threatened with loss of utilities: No     No results found.    Objective     /74 (BP Location: Right arm, Patient Position: Sitting, Cuff Size: Adult)   Pulse 98   Ht 5' 3.5\" (1.613 m)   Wt 87.1 kg (192 lb)   SpO2 98%   BMI 33.48 kg/m²     Physical Exam  Vitals and nursing note reviewed.   Constitutional:       Appearance: Normal appearance. She is well-developed. She is obese.   HENT:      Head: Normocephalic and atraumatic.      Right Ear: Tympanic membrane, ear canal and external ear normal.      Left Ear: Tympanic membrane, ear canal and external ear normal.      Nose: Nose normal.      Mouth/Throat:      Pharynx: No oropharyngeal exudate.   Eyes:      General: No scleral icterus.        Right eye: No discharge.         Left eye: No discharge.      Conjunctiva/sclera: Conjunctivae normal.      Pupils: Pupils are equal, round, and reactive to light.   Neck:      Thyroid: No thyromegaly.   Cardiovascular:      Rate and Rhythm: Normal rate and regular rhythm.      Pulses: no weak pulses.           Dorsalis pedis pulses are 2+ on the right side and 2+ on the left side.      Heart sounds: No murmur heard.     No gallop.   Pulmonary:      Effort: Pulmonary effort is normal. No respiratory distress.      Breath sounds: Normal breath sounds. No wheezing or rales.   Abdominal:      Palpations: Abdomen is soft.      " Tenderness: There is no abdominal tenderness.   Musculoskeletal:         General: No tenderness or deformity.      Cervical back: Normal range of motion.      Right lower leg: No edema.      Left lower leg: No edema.   Feet:      Right foot:      Skin integrity: No ulcer, skin breakdown, erythema, warmth, callus or dry skin.      Left foot:      Skin integrity: No ulcer, skin breakdown, erythema, warmth, callus or dry skin.   Lymphadenopathy:      Cervical: No cervical adenopathy.   Skin:     General: Skin is warm.      Capillary Refill: Capillary refill takes less than 2 seconds.      Findings: No erythema or rash.   Neurological:      Mental Status: She is alert and oriented to person, place, and time.      Deep Tendon Reflexes: Reflexes normal.   Psychiatric:         Behavior: Behavior is cooperative.         Thought Content: Thought content is not paranoid or delusional. Thought content does not include homicidal or suicidal plan.         Judgment: Judgment normal.         Patient's shoes and socks removed.    Right Foot/Ankle   Right Foot Inspection  Skin Exam: skin normal. Skin not intact, no dry skin, no warmth, no callus, no erythema, no maceration, no abnormal color, no pre-ulcer, no ulcer and no callus.     Toe Exam: ROM and strength within normal limits.     Sensory   Monofilament testing: intact    Vascular  Capillary refills: < 3 seconds  The right DP pulse is 2+.     Right Toe  - Comprehensive Exam  Ecchymosis: none  Swelling: none   Tenderness: none       Left Foot/Ankle  Left Foot Inspection  Skin Exam: skin normal. Skin not intact, no dry skin, no warmth, no erythema, no maceration, normal color, no pre-ulcer, no ulcer and no callus.     Toe Exam: ROM and strength within normal limits.     Sensory   Monofilament testing: intact    Vascular  Capillary refills: < 3 seconds  The left DP pulse is 2+.     Left Toe  - Comprehensive Exam  Ecchymosis: none  Swelling: none   Tenderness: none       Assign  Risk Category  No deformity present  No loss of protective sensation  No weak pulses  Risk: 0

## 2024-08-27 ENCOUNTER — TELEPHONE (OUTPATIENT)
Dept: PSYCHOLOGY | Facility: CLINIC | Age: 49
End: 2024-08-27

## 2024-08-27 NOTE — TELEPHONE ENCOUNTER
Tried to call pt couple of times to discuss PHP referral but the call is not going thru for some reason and it says pt is unavailable. Can try again later.

## 2024-09-12 ENCOUNTER — TELEPHONE (OUTPATIENT)
Dept: PSYCHOLOGY | Facility: CLINIC | Age: 49
End: 2024-09-12

## 2024-09-12 NOTE — TELEPHONE ENCOUNTER
Tried to call pt again today but her mother picked up the call and stated this is her number, not Vero's. She also stated Vero doesn't have an active phone at this time. She will ask Vero to call me back later to discuss PHP referral and to schedule appointments.

## 2024-09-15 LAB — COLOGUARD RESULT REPORTABLE: POSITIVE

## 2024-09-19 ENCOUNTER — TELEPHONE (OUTPATIENT)
Dept: PSYCHOLOGY | Facility: CLINIC | Age: 49
End: 2024-09-19

## 2024-09-23 ENCOUNTER — TELEPHONE (OUTPATIENT)
Dept: PSYCHOLOGY | Facility: CLINIC | Age: 49
End: 2024-09-23

## 2024-09-23 ENCOUNTER — TELEPHONE (OUTPATIENT)
Age: 49
End: 2024-09-23

## 2024-09-23 DIAGNOSIS — R19.5 POSITIVE COLORECTAL CANCER SCREENING USING COLOGUARD TEST: Primary | ICD-10-CM

## 2024-09-23 NOTE — TELEPHONE ENCOUNTER
Pt returned my call today regarding PHP referral and stated she is looking for only her meds refill and declined PHP at this time.

## 2024-09-23 NOTE — TELEPHONE ENCOUNTER
Patient called in stating she no longer needs a lyft for her appt in November. She also is requesting a print out of her labs to be picked up to take to [facility] and her Ct and Mammogram orders printed as well. She also wanted to let Dr. Street know that she is not interested in testing for any type of cancer as she does not want cancer treatments done. Also stated she is only doing all of this so that she can get her psych medications and doesn't trust doctors as they are all racist and because she is being threatened that she'll be locked up if she doesn't take her medications.

## 2024-10-01 LAB
ALBUMIN SERPL-MCNC: 4.2 G/DL (ref 3.6–5.1)
ALBUMIN/CREAT UR: 3 MG/G CREAT
ALBUMIN/GLOB SERPL: 1.4 (CALC) (ref 1–2.5)
ALP SERPL-CCNC: 86 U/L (ref 37–153)
ALT SERPL-CCNC: 9 U/L (ref 6–29)
AST SERPL-CCNC: 10 U/L (ref 10–35)
BASOPHILS # BLD AUTO: 44 CELLS/UL (ref 0–200)
BASOPHILS NFR BLD AUTO: 0.5 %
BILIRUB SERPL-MCNC: 0.5 MG/DL (ref 0.2–1.2)
BUN SERPL-MCNC: 8 MG/DL (ref 7–25)
BUN/CREAT SERPL: ABNORMAL (CALC) (ref 6–22)
CALCIUM SERPL-MCNC: 9 MG/DL (ref 8.6–10.4)
CHLORIDE SERPL-SCNC: 102 MMOL/L (ref 98–110)
CHOLEST SERPL-MCNC: 162 MG/DL
CHOLEST/HDLC SERPL: 5.8 (CALC)
CO2 SERPL-SCNC: 29 MMOL/L (ref 20–32)
CREAT SERPL-MCNC: 0.87 MG/DL (ref 0.5–1.03)
CREAT UR-MCNC: 80 MG/DL (ref 20–275)
EOSINOPHIL # BLD AUTO: 211 CELLS/UL (ref 15–500)
EOSINOPHIL NFR BLD AUTO: 2.4 %
ERYTHROCYTE [DISTWIDTH] IN BLOOD BY AUTOMATED COUNT: 14.4 % (ref 11–15)
GFR/BSA.PRED SERPLBLD CYS-BASED-ARV: 81 ML/MIN/1.73M2
GLOBULIN SER CALC-MCNC: 3 G/DL (CALC) (ref 1.9–3.7)
GLUCOSE SERPL-MCNC: 164 MG/DL (ref 65–99)
HBA1C MFR BLD: 9.2 % OF TOTAL HGB
HCT VFR BLD AUTO: 46.2 % (ref 35–45)
HDLC SERPL-MCNC: 28 MG/DL
HGB BLD-MCNC: 14.8 G/DL (ref 11.7–15.5)
LDLC SERPL CALC-MCNC: 112 MG/DL (CALC)
LYMPHOCYTES # BLD AUTO: 2323 CELLS/UL (ref 850–3900)
LYMPHOCYTES NFR BLD AUTO: 26.4 %
MCH RBC QN AUTO: 28.6 PG (ref 27–33)
MCHC RBC AUTO-ENTMCNC: 32 G/DL (ref 32–36)
MCV RBC AUTO: 89.4 FL (ref 80–100)
MICROALBUMIN UR-MCNC: 0.2 MG/DL
MONOCYTES # BLD AUTO: 598 CELLS/UL (ref 200–950)
MONOCYTES NFR BLD AUTO: 6.8 %
NEUTROPHILS # BLD AUTO: 5623 CELLS/UL (ref 1500–7800)
NEUTROPHILS NFR BLD AUTO: 63.9 %
NONHDLC SERPL-MCNC: 134 MG/DL (CALC)
PLATELET # BLD AUTO: 302 THOUSAND/UL (ref 140–400)
PMV BLD REES-ECKER: 10.1 FL (ref 7.5–12.5)
POTASSIUM SERPL-SCNC: 4.1 MMOL/L (ref 3.5–5.3)
PROT SERPL-MCNC: 7.2 G/DL (ref 6.1–8.1)
RBC # BLD AUTO: 5.17 MILLION/UL (ref 3.8–5.1)
SODIUM SERPL-SCNC: 137 MMOL/L (ref 135–146)
TRIGL SERPL-MCNC: 117 MG/DL
WBC # BLD AUTO: 8.8 THOUSAND/UL (ref 3.8–10.8)

## 2024-10-21 ENCOUNTER — RA CDI HCC (OUTPATIENT)
Dept: OTHER | Facility: HOSPITAL | Age: 49
End: 2024-10-21

## 2024-10-29 ENCOUNTER — OFFICE VISIT (OUTPATIENT)
Dept: FAMILY MEDICINE CLINIC | Facility: CLINIC | Age: 49
End: 2024-10-29
Payer: MEDICARE

## 2024-10-29 ENCOUNTER — TELEPHONE (OUTPATIENT)
Age: 49
End: 2024-10-29

## 2024-10-29 VITALS
RESPIRATION RATE: 16 BRPM | DIASTOLIC BLOOD PRESSURE: 82 MMHG | SYSTOLIC BLOOD PRESSURE: 140 MMHG | HEART RATE: 107 BPM | TEMPERATURE: 98 F | HEIGHT: 64 IN | OXYGEN SATURATION: 96 % | WEIGHT: 192 LBS | BODY MASS INDEX: 32.78 KG/M2

## 2024-10-29 DIAGNOSIS — F25.0 SCHIZOAFFECTIVE DISORDER, BIPOLAR TYPE (HCC): Primary | ICD-10-CM

## 2024-10-29 DIAGNOSIS — E11.65 UNCONTROLLED TYPE 2 DIABETES MELLITUS WITH HYPERGLYCEMIA, WITHOUT LONG-TERM CURRENT USE OF INSULIN (HCC): ICD-10-CM

## 2024-10-29 DIAGNOSIS — F60.3 BORDERLINE PERSONALITY DISORDER (HCC): ICD-10-CM

## 2024-10-29 PROCEDURE — 99214 OFFICE O/P EST MOD 30 MIN: CPT | Performed by: FAMILY MEDICINE

## 2024-10-29 PROCEDURE — G2211 COMPLEX E/M VISIT ADD ON: HCPCS | Performed by: FAMILY MEDICINE

## 2024-10-29 NOTE — TELEPHONE ENCOUNTER
Please advise mother that Vero walked out and does not want to see me, was rude and intimidating. I cannot continue her care.She has a psychiatrist and they need to contact the psychiatrist office of the medications. Invega is out of my scope of practise.   Also, she was rude to the staff as well as used racial language against me and I cannot continue her care.

## 2024-10-29 NOTE — TELEPHONE ENCOUNTER
Pts mother called in, since she wasn't able to be at her appointment with her today. She is not sure how the appointment went but she wanted to apologize incchava Pham wasn't cooperative. Vero told Shanna she refused treatment for her diabetes stating it is her medicine that is giving her diabetes.     Pts mother is wondering if Dr. Street is going to be able to refill her psych medication. She is not due for a refill until 11/18/24 but she wants to make sure Dr. Street will be able to refill this medication at that time so sh doesn't go without. Pt is currently on a wait list for a new psych doctor however it is 3 months out.     Shanna states Vero doesn't do well with the 3 month injection of the invega sustenna, she feels it seems to not last as long. She seems to be doing well with the 1 month injections.    Shanna would like if Dr. Street is able to reach out to her and she can further explain.

## 2024-10-29 NOTE — PROGRESS NOTES
Subjective:      Patient ID: Vero Quijano is a 49 y.o. female.    With uncontrolled diabetes, shizoaffective disorder bipolar type here for follow up     She states she is here to get Invega and risperidone, she has a psychiatrist and does not tell me why she would like me to refil these.  She has also sent me many inappropriate [employer] messages, which I have told her not to send and that I have not been able to review or read any of them, but my staff briefly reviewed them.  She was very upset and told me to to just answer yes or no if I will fill her meds        Past Medical History:   Diagnosis Date    Acute hypoxemic respiratory failure (HCC) 3/27/2021    Schizoaffective disorder (HCC)        History reviewed. No pertinent family history.    History reviewed. No pertinent surgical history.     reports that she has been smoking. She has a 35 pack-year smoking history. She has never used smokeless tobacco. She reports that she does not drink alcohol and does not use drugs.      Current Outpatient Medications:     diphenhydrAMINE (BENADRYL) 50 mg capsule, Take 50 mg by mouth daily at bedtime, Disp: , Rfl:     Invega Sustenna 234 MG/1.5ML IM injection, 234 mg by IM- Deltoid route every 30 (thirty) days, Disp: , Rfl:     docusate sodium (COLACE) 100 mg capsule, Take 1 capsule (100 mg total) by mouth 2 (two) times a day as needed for constipation Hold for lose stool (Patient not taking: Reported on 10/29/2024), Disp: 10 capsule, Rfl: 0    polyethylene glycol (GLYCOLAX) 17 GM/SCOOP powder, Drink 1 scoop with 8 oz of water (Patient not taking: Reported on 10/29/2024), Disp: 507 g, Rfl: 0    risperiDONE (RisperDAL) 2 mg tablet, Take 2 mg by mouth every 12 (twelve) hours, Disp: , Rfl:     The following portions of the patient's history were reviewed and updated as appropriate: allergies, current medications, past family history, past medical history, past social history, past surgical history and problem  "list.    Review of Systems   Unable to perform ROS: Psychiatric disorder                   Objective:    /82 (BP Location: Left arm, Patient Position: Sitting, Cuff Size: Standard)   Pulse (!) 107   Temp 98 °F (36.7 °C) (Tympanic)   Resp 16   Ht 5' 3.5\" (1.613 m)   Wt 87.1 kg (192 lb)   SpO2 96%   BMI 33.48 kg/m²      Physical Exam  Vitals reviewed: refused.           Recent Results (from the past 8736 hour(s))   ECG 12 lead    Collection Time: 01/22/24 11:53 PM   Result Value Ref Range    Ventricular Rate 95 BPM    Atrial Rate 95 BPM    AL Interval 150 ms    QRSD Interval 74 ms    QT Interval 362 ms    QTC Interval 454 ms    P Axis 68 degrees    QRS Axis 92 degrees    T Wave Axis 47 degrees   CBC and differential    Collection Time: 01/22/24 11:56 PM   Result Value Ref Range    WBC 16.95 (H) 4.31 - 10.16 Thousand/uL    RBC 5.53 (H) 3.81 - 5.12 Million/uL    Hemoglobin 15.9 (H) 11.5 - 15.4 g/dL    Hematocrit 47.2 (H) 34.8 - 46.1 %    MCV 85 82 - 98 fL    MCH 28.8 26.8 - 34.3 pg    MCHC 33.7 31.4 - 37.4 g/dL    RDW 13.3 11.6 - 15.1 %    MPV 9.4 8.9 - 12.7 fL    Platelets 304 149 - 390 Thousands/uL    nRBC 0 /100 WBCs    Segmented % 85 (H) 43 - 75 %    Immature Grans % 1 0 - 2 %    Lymphocytes % 10 (L) 14 - 44 %    Monocytes % 4 4 - 12 %    Eosinophils Relative 0 0 - 6 %    Basophils Relative 0 0 - 1 %    Absolute Neutrophils 14.36 (H) 1.85 - 7.62 Thousands/µL    Absolute Immature Grans 0.08 0.00 - 0.20 Thousand/uL    Absolute Lymphocytes 1.73 0.60 - 4.47 Thousands/µL    Absolute Monocytes 0.66 0.17 - 1.22 Thousand/µL    Eosinophils Absolute 0.06 0.00 - 0.61 Thousand/µL    Basophils Absolute 0.06 0.00 - 0.10 Thousands/µL   Comprehensive metabolic panel    Collection Time: 01/22/24 11:56 PM   Result Value Ref Range    Sodium 134 (L) 135 - 147 mmol/L    Potassium 3.8 3.5 - 5.3 mmol/L    Chloride 105 96 - 108 mmol/L    CO2 22 21 - 32 mmol/L    ANION GAP 7 mmol/L    BUN 7 5 - 25 mg/dL    Creatinine 0.77 0.60 " - 1.30 mg/dL    Glucose 174 (H) 65 - 140 mg/dL    Calcium 9.0 8.4 - 10.2 mg/dL    AST 12 (L) 13 - 39 U/L    ALT 8 7 - 52 U/L    Alkaline Phosphatase 71 34 - 104 U/L    Total Protein 7.9 6.4 - 8.4 g/dL    Albumin 4.3 3.5 - 5.0 g/dL    Total Bilirubin 0.40 0.20 - 1.00 mg/dL    eGFR 90 ml/min/1.73sq m   TSH, 3rd generation with Free T4 reflex    Collection Time: 01/22/24 11:56 PM   Result Value Ref Range    TSH 3RD GENERATON 1.556 0.450 - 4.500 uIU/mL   hCG, qualitative pregnancy    Collection Time: 01/22/24 11:56 PM   Result Value Ref Range    Preg, Serum Negative Negative   Rapid drug screen, urine    Collection Time: 01/23/24  2:49 AM   Result Value Ref Range    Amph/Meth UR Negative Negative    Barbiturate Ur Negative Negative    Benzodiazepine Urine Negative Negative    Cocaine Urine Negative Negative    Methadone Urine Negative Negative    Opiate Urine Negative Negative    PCP Ur Negative Negative    THC Urine Negative Negative    Oxycodone Urine Negative Negative   POCT pregnancy, urine    Collection Time: 01/23/24  2:53 AM   Result Value Ref Range    EXT Preg Test, Ur Negative     Control Valid    Ethanol    Collection Time: 01/23/24  3:11 AM   Result Value Ref Range    Ethanol Lvl <10 <10 mg/dL   COVID/FLU/RSV    Collection Time: 01/23/24  2:50 PM    Specimen: Nose; Nares   Result Value Ref Range    SARS-CoV-2 Negative Negative    INFLUENZA A PCR Negative Negative    INFLUENZA B PCR Negative Negative    RSV PCR Negative Negative   CBC and differential    Collection Time: 07/02/24  5:43 PM   Result Value Ref Range    WBC 10.44 (H) 4.31 - 10.16 Thousand/uL    RBC 5.26 (H) 3.81 - 5.12 Million/uL    Hemoglobin 14.9 11.5 - 15.4 g/dL    Hematocrit 44.7 34.8 - 46.1 %    MCV 85 82 - 98 fL    MCH 28.3 26.8 - 34.3 pg    MCHC 33.3 31.4 - 37.4 g/dL    RDW 13.8 11.6 - 15.1 %    MPV 9.5 8.9 - 12.7 fL    Platelets 304 149 - 390 Thousands/uL    nRBC 0 /100 WBCs    Segmented % 75 43 - 75 %    Immature Grans % 0 0 - 2 %     Lymphocytes % 17 14 - 44 %    Monocytes % 5 4 - 12 %    Eosinophils Relative 2 0 - 6 %    Basophils Relative 1 0 - 1 %    Absolute Neutrophils 7.86 (H) 1.85 - 7.62 Thousands/µL    Absolute Immature Grans 0.04 0.00 - 0.20 Thousand/uL    Absolute Lymphocytes 1.80 0.60 - 4.47 Thousands/µL    Absolute Monocytes 0.52 0.17 - 1.22 Thousand/µL    Eosinophils Absolute 0.16 0.00 - 0.61 Thousand/µL    Basophils Absolute 0.06 0.00 - 0.10 Thousands/µL   Comprehensive metabolic panel    Collection Time: 07/02/24  5:43 PM   Result Value Ref Range    Sodium 136 135 - 147 mmol/L    Potassium 3.6 3.5 - 5.3 mmol/L    Chloride 103 96 - 108 mmol/L    CO2 26 21 - 32 mmol/L    ANION GAP 7 4 - 13 mmol/L    BUN 9 5 - 25 mg/dL    Creatinine 0.89 0.60 - 1.30 mg/dL    Glucose 214 (H) 65 - 140 mg/dL    Calcium 9.4 8.4 - 10.2 mg/dL    AST 9 (L) 13 - 39 U/L    ALT 10 7 - 52 U/L    Alkaline Phosphatase 76 34 - 104 U/L    Total Protein 7.4 6.4 - 8.4 g/dL    Albumin 4.0 3.5 - 5.0 g/dL    Total Bilirubin 0.40 0.20 - 1.00 mg/dL    eGFR 75 ml/min/1.73sq m   TSH    Collection Time: 07/02/24  5:43 PM   Result Value Ref Range    TSH 3RD GENERATON 0.899 0.450 - 4.500 uIU/mL   Ethanol    Collection Time: 07/02/24  5:43 PM   Result Value Ref Range    Ethanol Lvl <10 <10 mg/dL   hCG, qualitative pregnancy    Collection Time: 07/02/24  5:43 PM   Result Value Ref Range    Preg, Serum Negative Negative   Green / Black tube on hold    Collection Time: 07/02/24  5:43 PM   Result Value Ref Range    Extra Tube Hold for add-ons.    ECG 12 lead    Collection Time: 07/02/24  6:11 PM   Result Value Ref Range    Ventricular Rate 95 BPM    Atrial Rate 95 BPM    LA Interval 164 ms    QRSD Interval 74 ms    QT Interval 364 ms    QTC Interval 457 ms    P Axis 75 degrees    QRS Axis 88 degrees    T Wave Axis 59 degrees   Rapid drug screen, urine    Collection Time: 07/02/24  6:14 PM   Result Value Ref Range    Amph/Meth UR Negative Negative    Barbiturate Ur Negative Negative     Benzodiazepine Urine Positive (A) Negative    Cocaine Urine Negative Negative    Methadone Urine Negative Negative    Opiate Urine Negative Negative    PCP Ur Negative Negative    THC Urine Negative Negative    Oxycodone Urine Negative Negative    Fentanyl Urine Negative Negative    HYDROCODONE URINE Negative Negative   UA w Reflex to Microscopic w Reflex to Culture    Collection Time: 24  6:14 PM    Specimen: Urine, Clean Catch   Result Value Ref Range    Color, UA Light Yellow     Clarity, UA Clear     Specific Gravity, UA 1.009 1.003 - 1.030    pH, UA 5.5 4.5, 5.0, 5.5, 6.0, 6.5, 7.0, 7.5, 8.0    Leukocytes, UA Negative Negative    Nitrite, UA Negative Negative    Protein, UA Negative Negative mg/dl    Glucose, UA Negative Negative mg/dl    Ketones, UA Negative Negative mg/dl    Urobilinogen, UA <2.0 <2.0 mg/dl mg/dl    Bilirubin, UA Negative Negative    Occult Blood, UA Negative Negative   Fingerstick Glucose (POCT)    Collection Time: 24  8:34 PM   Result Value Ref Range    POC Glucose 154 (H) 65 - 140 mg/dl   IRIS Diabetic eye exam    Collection Time: 24 12:25 PM   Result Value Ref Range    Severity ALERT (A)     Right Eye Diabetic Retinopathy INCONCLUSIVE     Right Eye Macular Edema INCONCLUSIVE     Right Eye Other Retinopathy INCONCLUSIVE     Right Eye Image Quality Not Gradable Image     Left Eye Diabetic Retinopathy INCONCLUSIVE     Left Eye Macular Edema INCONCLUSIVE     Left Eye Other Retinopathy INCONCLUSIVE     Left Eye Image Quality Not Gradable Image     Result Retinal Study Result for CONTRERAS VILLALBA     Result      Result       thomas MALONEY 50 y/o, F (: 1975, MRN: [id number])    Result       presented to [facility] on 2024 for a retinal imaging study of the left and right eyes.    Result      Result       Based on the findings of the study, the following is recommended for CONTRERAS VILLALBA    Result       Not Gradable Eye(s) Found: Schedule an  appointment with a retina specialist for further evaluation within 3 months.    Result      Result       Interpreting Provider's Comments:  No comments provided    Result       Diagnoses Present: E119 - Type 2 diabetes mellitus without complications    Result      Result       Right eye findings: Image not gradable for reasons listed: Key anatomy missing or not clearly visible    Result      Result       Left eye findings: Image not gradable for reasons listed: Key anatomy missing or not clearly visible    Result      Result       This result was electronically signed by Jax Garland MD, NPI: [id number], Taxonomy: [id number] on 08- 16:31 [facility].    Result      Result       NOTE:  Any pathology noted on this diabetic retinal evaluation should be confirmed by an appropriate ophthalmic examination.   COLOGUARD    Collection Time: 09/08/24  2:45 PM   Result Value Ref Range    Cologuard Result Positive (A) Negative   Cologuard    Collection Time: 09/08/24  6:45 PM   Result Value Ref Range    Cologuard Result Positive (A) Negative   Lipid panel    Collection Time: 09/30/24  9:42 AM   Result Value Ref Range    Total Cholesterol 162 <200 mg/dL    HDL 28 (L) > OR = 50 mg/dL    Triglycerides 117 <150 mg/dL    LDL Calculated 112 (H) mg/dL (calc)    Chol HDLC Ratio 5.8 (H) <5.0 (calc)    Non-HDL Cholesterol 134 (H) <130 mg/dL (calc)   Microalbumin, Random Urine (W/Creatinine)    Collection Time: 09/30/24  9:42 AM   Result Value Ref Range    Creatinine, Urine 80 20 - 275 mg/dL    Albumin,U,Random 0.2 See Note: mg/dL    Microalb/Creat Ratio 3 <30 mg/g creat   Comprehensive metabolic panel    Collection Time: 09/30/24  9:42 AM   Result Value Ref Range    Glucose, Random 164 (H) 65 - 99 mg/dL    BUN 8 7 - 25 mg/dL    Creatinine 0.87 0.50 - 1.03 mg/dL    eGFR 81 > OR = 60 mL/min/1.73m2    SL AMB BUN/CREATININE RATIO SEE NOTE: 6 - 22 (calc)    Sodium 137 135 - 146 mmol/L    Potassium 4.1 3.5 - 5.3 mmol/L    Chloride 102 98 -  110 mmol/L    CO2 29 20 - 32 mmol/L    Calcium 9.0 8.6 - 10.4 mg/dL    Protein, Total 7.2 6.1 - 8.1 g/dL    Albumin 4.2 3.6 - 5.1 g/dL    Globulin 3.0 1.9 - 3.7 g/dL (calc)    Albumin/Globulin Ratio 1.4 1.0 - 2.5 (calc)    TOTAL BILIRUBIN 0.5 0.2 - 1.2 mg/dL    Alkaline Phosphatase 86 37 - 153 U/L    AST 10 10 - 35 U/L    ALT 9 6 - 29 U/L   CBC and differential    Collection Time: 09/30/24  9:42 AM   Result Value Ref Range    White Blood Cell Count 8.8 3.8 - 10.8 Thousand/uL    Red Blood Cell Count 5.17 (H) 3.80 - 5.10 Million/uL    Hemoglobin 14.8 11.7 - 15.5 g/dL    HCT 46.2 (H) 35.0 - 45.0 %    MCV 89.4 80.0 - 100.0 fL    MCH 28.6 27.0 - 33.0 pg    MCHC 32.0 32.0 - 36.0 g/dL    RDW 14.4 11.0 - 15.0 %    Platelet Count 302 140 - 400 Thousand/uL    SL AMB MPV 10.1 7.5 - 12.5 fL    Neutrophils (Absolute) 5,623 1,500 - 7,800 cells/uL    Lymphocytes (Absolute) 2,323 850 - 3,900 cells/uL    Monocytes (Absolute) 598 200 - 950 cells/uL    Eosinophils (Absolute) 211 15 - 500 cells/uL    Basophils ABS 44 0 - 200 cells/uL    Neutrophils 63.9 %    Lymphocytes 26.4 %    Monocytes 6.8 %    Eosinophils 2.4 %    Basophils PCT 0.5 %   Hemoglobin A1c (w/out EAG)    Collection Time: 09/30/24  9:42 AM   Result Value Ref Range    Hemoglobin A1C 9.2 (H) <5.7 % of total Hgb       Laboratory Results: I have personally reviewed the pertinent laboratory results/reports         Assessment/Plan:  1. Schizoaffective disorder, bipolar type (HCC)  2. Borderline personality disorder (HCC)  3. Uncontrolled type 2 diabetes mellitus with hyperglycemia, without long-term current use of insulin (HCC)  -     Hemoglobin A1C; Future; Expected date: 10/29/2024  -     Comprehensive metabolic panel; Future; Expected date: 10/29/2024        Discussed with her that I am her medical doctor not psychiatrist and she should be following up with her psychiatrist who is treating her for her mental health.     I have requested her to not send me any mychart  "messages and to call if she has health related concerns.    She also does not want any treatment for her diabetes and stood up and left the room.      staff Carleen reported to me that patient demanded to see someone else who would give her psych meds and that she is American and she does not like me.    I have advised my  since I do not have a good patient -doctor relationship with Vero, to send a dismissal letter with 30 day notice.                Read package inserts for all medications before starting a new medications, call me if you have any questions.    Patient was given opportunity to ask questions and all questions were answered.    Disclaimer: Portions of the record may have been created with voice recognition software. Occasional wrong word or \"sound a like\" substitutions may have occurred due to the inherent limitations of voice recognition software. Read the chart carefully and recognize, using context, where substitutions have occurred. I have used the Epic copy/forward function to compose this note. I have reviewed my current note to ensure it reflects the current patient status, exam, assessment and plan.    "

## 2024-10-30 ENCOUNTER — TELEPHONE (OUTPATIENT)
Dept: FAMILY MEDICINE CLINIC | Facility: CLINIC | Age: 49
End: 2024-10-30

## 2024-10-30 DIAGNOSIS — Z78.9 NEED FOR FOLLOW-UP BY SOCIAL WORKER: Primary | ICD-10-CM

## 2024-10-30 NOTE — TELEPHONE ENCOUNTER
Last week patient came in the office during lunch time stating she needed to be seen. She told me she was discharged from her [facility] practice due to the number of emails she sent to the doctor. I schedule patient for 10/29/2024 with Dr. Street. She came to her appointment yesterday. The appointment was so quick. She came to check out and she stated “ She didn't want to be seen in this practice anymore. Asked if another provider can see her yesterday. I explained to her that she just said she didn't want to come here, and all the providers help each other. She goes on to say we were racist and that she didn't like Dr. Street. She was American and we don't tell her how to pronounce her name. I just had repeated how to say Dr. Street name and told her she was adding a r Its Jad no Vowin[place]el.  She then started saying we were racist, and I explain to her that her and I was the same race that was not it. The doctor may just have not been comfortable to give her the medication. I should her where the exit was, and the patient left.     Today patient come in the office and states” Hi I saw a white woman yesterday here that no longer works her she left the practice. I have bug bites all over me and need to be seen today. I told Aida to buzz patient back. She comes back to my desk, and she instantly went manic. She started yelling I called Shanna and that we banned her from [facility] practice. Patient was recording me on her cell phone. She goes to say I called Shanna. I stated I did not call here my coworker called her. She says well I'm a schizophrenic you need to be clear on how you talk to me.  I immediately called Christine to let her listen and talk to patient. They discussed a few things over the phone and patient started to get more manic. I hung up the phone and called 911. Patient gave me a stare down and told me you know I'm schizophrenic (3xs) and told me I need to be clear on what I say to her. I called the police  and explained that she was not violent at the moment, but she is getting loud and aggressive, and we needed a escort. Once patient knew the police officers were on the way she left the premises immediately.

## 2024-10-30 NOTE — TELEPHONE ENCOUNTER
Received call from Carleen that Vero was in the office wanting an appointment and saying we dismissed her from [facility]. I was at another office and immediately pulled up Vero eaton to understand what was going on. Carleen had me on speaker and Vero stated that we banned her from [facility]. I explained that we did not banned her from anywhere. She then asked if she was able to seen by another doctor in the practice. I told her I was unable to answer that right now as I had to get updated on what had happen in the office and at Dr. Street's visit. She repeated louder if she was able to see someone in the practice. Then again ask if I was banning her from the [facility]. She then said she was schizophrenic and that we diagnosed her with this. I explained that we didn't diagnosed her with this and that I would be there shortly to talk to her if she could just wait in the hallway. She than began to yell she's a black woman and wants to know what racial slur she used. She then began to yell different racial slurs and started addressing Carleen in more aggressively stating she is a black woman like she is. I advised Carleen to call the police to have patient escorted out of the office. When I arrive the patient was gone and the  were speaking to Dr. Street and Carleen about what had happen.    Mohs Method Verbiage: An incision at a 45 degree angle following the standard Mohs approach was done and the specimen was harvested as a microscopic controlled layer.

## 2024-10-31 ENCOUNTER — PATIENT OUTREACH (OUTPATIENT)
Dept: CASE MANAGEMENT | Facility: OTHER | Age: 49
End: 2024-10-31

## 2024-10-31 NOTE — PROGRESS NOTES
HEATHER WINCHESTER received a referral regarding patient's need for assistance obtaining psychiatry services as patient's PCP is unable to prescribe psych meds at this time. HEATHER WINCHESTER completed extensive chart review. Noted previous  admissions.  Per chart review, patient states she is already on the wait list for [facility]. Pt was also contacted by [facility] but declined interest in the program as she would only like assistance with med management. HEATHER WINCHESTER placed calls to several mental health agencies in the community that offer therapy and med management in hopes to find an agency that may be able to assist pt.     [facility]-Do not accept Medicaid  [facility]-Do not accept Medicare or Medicaid  [facility]-Do not accept Medicaid   [facility]-Do not accept Medicare  [facility]-[place] office only has one clinician who is able to accept both insurances and they are fully booked and not able to accept any new pts at this time  [facility]-Are able to accept both insurances but only for [place] residents      HEATHER WINCHESTER also contacted [facility] to confirm that pt is on the wait list. HEATHER WINCHESTER was informed that pt is on the wait list-they were unable to provide any details regarding how long pt has been on the list or where she is on the list. HEATHER WINCHESTER explained the situation and attempted to see if pt could be expedited to wait list given the circumstances however HEATHER WINCHESTER unsuccessful in doing so.     HEATHER WINCHESTER attempted to contact pt at this time. LVM requesting a call in return at her earliest convenience.     -    HEATHER WINCHESTER received a call in return from patient's mother, Shanna who is in communication consent. Shanna states that pt is currently living with her. Pt is not currently eligible for housing as she needs to be without an eviction for 7 years prior to being eligible for housing program. Shanna expressed that pt was  previously being seen by [facility] however they discharged pt due to her not wanting to change her medication.. [facility] also does not accept Medicaid insurance which is patient's secondary insurance. Shanna states that patient went to a hospital in [place] where she was given 3 months worth of medication. Shanna states that patient no longer has any refills available to however. She received her last Invega injection on 10/18/24 and is supposed to get an injection monthly. Shanna also reports pt will also need refills on risperidone medication.     HEATHER WINCHESTER informed Shanna that several calls have been made to various agencies in attempt to find a psychiatrist that can see pt. HEATHER WINCHESTER explained that due to patient's dual Medicare/Medicaid insurance, it has been quite challenging to find a provider. Also informed Shanna that HEATHER WINCHESTER attempted to expedite her spot of [facility] wait list but was unable to do so. Shanna states pt has been on the list for 3 months and was told it would be probably another 3 months before she gets a call to schedule.     Shanna states that pt is unable to continue living with her if she does not get refills on her medications due to it becoming more difficult to manage pts behaviors. Pt is due for another Invega injection on 11/18 and Shanna is concerned that pt will be without which would inevitably cause her behaviors to escalate.Shanna reports that pt is not currently experiencing any SI/HI. Shanna is a nurse and is aware of crisis protocol should SI/HI become a concern. HEATHER WINCHESTER agreed to consult case to determine what next steps for pt should be at this time and what we may be able to do to assist. HEATHER WINCHESTER also provided Shanna with her contact info-encouraged her to reach out if any questions or concerns arise.    --    HEATHER WINCHESTER continued attempts to locate a provider for pt.    [facility]- Do not accept Medicaid   [facility]- Left a  voicemail to inquire if they are able to accept pts insurance   [facility]-They may be able to accept both insurances, but could confirm. They also do have a wait list for services.  [facility]-Are not able to accept any new pts with dual Medicare/Medicaid due to having a 2 year wait list  [facility] -Do not accept Medicare  [facility]- LVM   [facility]- LVM

## 2024-11-01 ENCOUNTER — PATIENT OUTREACH (OUTPATIENT)
Dept: CASE MANAGEMENT | Facility: OTHER | Age: 49
End: 2024-11-01

## 2024-11-01 NOTE — PROGRESS NOTES
HEATHER WINCHESTER attempted to contact patient's mother at this time to provide an update. Will continue attempts to reach.

## 2024-11-05 ENCOUNTER — PATIENT OUTREACH (OUTPATIENT)
Dept: CASE MANAGEMENT | Facility: OTHER | Age: 49
End: 2024-11-05

## 2024-11-05 NOTE — PROGRESS NOTES
HEATHER WINCHESTER contacted patient's mother, Shanna, at this time to provide update. Attempts to find an agency that would accept the dual Medicare/Medicaid for psychiatry were unsuccessful at this time. Explained it would be imperative to follow up with [facility] once they contact her to inform her of availability as they are the only option available at this time. Shanna expressed gratitude for the assistance. Referral will be closed.

## 2024-12-02 ENCOUNTER — VBI (OUTPATIENT)
Dept: ADMINISTRATIVE | Facility: OTHER | Age: 49
End: 2024-12-02

## 2024-12-02 NOTE — TELEPHONE ENCOUNTER
12/02/24 5:56 PM     Chart reviewed for Hemoglobin A1c ; nothing is submitted to the patient's insurance at this time.     aNya Ziegler MA   [facility]

## 2024-12-19 ENCOUNTER — TELEPHONE (OUTPATIENT)
Age: 49
End: 2024-12-19

## 2024-12-19 NOTE — TELEPHONE ENCOUNTER
Patient on wait list for med mgmt services.  Called Pt to offer an appt. Spoke to mom, writer informed mom that we need verbal consent from Pt to speak to her. Mom verbalized understanding and stated that they will call back tomorrow.    1st attempt

## 2024-12-19 NOTE — TELEPHONE ENCOUNTER
"Behavioral Health Outpatient Intake Questions    Referred By   : SELF    Please advise interviewee that they need to answer all questions truthfully to allow for best care, and any misrepresentations of information may affect their ability to be seen at this clinic   => Was this discussed? Yes       Behavioral Health Outpatient Intake History -     Presenting Problem (in patient's own words):     Patient stated that she is looking for a specific provider due to her negative experiences due to the color of her skin and her education. Patient stated that she was treated poorly due to her mental illness. Patient stated that she had a hidden camera installed in her [facility] room. Patient is requesting for a provider that is culturally competent and multiculturalism awareness. Patient wants an American provider and not someone who is .    Patient stated that in 1998 she had a suicide attempt and was in [employer] for 2 years. Patient was harassed by neighbors who were , which caused her problems. Patient suffers from insomnia.     Patient stated that she gets checked into the hospital and is 302'd about once a year. Patient states doctors are not happy with her behavior and how she presents herself. Patient believes that it is due to her culture and personality.    Patient has been getting medication by a  since 24yo for anti-psychotics for Schizo-Affective Disorder.    Are there any communication barriers for this patient?     No                                                   Are you taking any psychiatric medications? Yes     If \"YES\" -What are they Invega Sustenna     If \"YES\" -Who prescribes? Dr. Boyd    Has the Patient previously received outpatient Talk Therapy or Medication Management from [facility]  No        If \"YES\"- When, Where and with Whom?         If \"NO\" -Has Patient received these services elsewhere?       If \"YES\" -When, Where, and with Whom? Hospitalization, " "[facility] in [place]    Has the Patient abused alcohol or other substances in the last 6 months ? No  No concerns of substance abuse are reported.     If \"YES\" -What substance, How much, How often?     If illegal substance: Refer to [facility] (for SYED) or [facility].   If Alcohol in excess of 10 drinks per week:  Refer to [facility] (for SYED) or [facility] Off[facility]    Legal History-     Is this treatment court ordered? No       Has the Patient been convicted of a felony?  No      ACCEPTED as a patient Yes  If \"Yes\" Appointment Date:     Dr. Santizo, 3/28 10:30 & 4/25 13:00    Referred Elsewhere? No    Name of Insurance Co:[employer]  Insurance ID#[id number]   Insurance Phone #  If ins is primary or secondary? PRIMARY    [phone]   [facility] ORAL  "

## 2025-01-06 ENCOUNTER — VBI (OUTPATIENT)
Dept: ADMINISTRATIVE | Facility: OTHER | Age: 50
End: 2025-01-06

## 2025-01-06 NOTE — TELEPHONE ENCOUNTER
01/06/25 9:04 AM     Chart reviewed for Hemoglobin A1c ; nothing is submitted to the patient's insurance at this time.     Naya Ziegler MA   [facility]

## 2025-01-23 ENCOUNTER — HOSPITAL ENCOUNTER (EMERGENCY)
Facility: HOSPITAL | Age: 50
Discharge: HOME | End: 2025-01-23
Attending: EMERGENCY MEDICINE | Admitting: EMERGENCY MEDICINE
Payer: MEDICARE

## 2025-01-23 VITALS
WEIGHT: 194 LBS | BODY MASS INDEX: 33.83 KG/M2 | RESPIRATION RATE: 18 BRPM | TEMPERATURE: 98.3 F | DIASTOLIC BLOOD PRESSURE: 94 MMHG | SYSTOLIC BLOOD PRESSURE: 164 MMHG | OXYGEN SATURATION: 95 % | HEART RATE: 103 BPM

## 2025-01-23 DIAGNOSIS — Z76.0 MEDICATION REFILL: Primary | ICD-10-CM

## 2025-01-23 PROCEDURE — 99282 EMERGENCY DEPT VISIT SF MDM: CPT

## 2025-01-23 PROCEDURE — 99284 EMERGENCY DEPT VISIT MOD MDM: CPT

## 2025-01-23 RX ORDER — PALIPERIDONE PALMITATE 234 MG/1.5ML
234 INJECTION INTRAMUSCULAR
Qty: 1 ML | Refills: 0 | Status: SHIPPED | OUTPATIENT
Start: 2025-01-23

## 2025-01-23 NOTE — Clinical Note
Vero Quijano was seen and treated in our emergency department on 1/23/2025.    No restrictions            Diagnosis:     Vero  .    She may return on this date: 01/24/2025         If you have any questions or concerns, please don't hesitate to call.      Juan Antonio Dowling PA-C    ______________________________           _______________          _______________  Hospital Representative                              Date                                Time

## 2025-01-24 ENCOUNTER — VBI (OUTPATIENT)
Dept: FAMILY MEDICINE CLINIC | Facility: CLINIC | Age: 50
End: 2025-01-24

## 2025-01-24 ENCOUNTER — TELEPHONE (OUTPATIENT)
Dept: PSYCHIATRY | Facility: CLINIC | Age: 50
End: 2025-01-24

## 2025-01-24 NOTE — TELEPHONE ENCOUNTER
01/24/25 9:30 AM    Patient contacted post ED visit, outreach attempt made but message could not be left. Additional outreach attempt will be made.     Thank you.  Salma Amaya MA  [facility]

## 2025-01-24 NOTE — TELEPHONE ENCOUNTER
Writer called and left message to reschedule New Patient and 4w Follow up due to Dr Santizo covering In Patient this day.

## 2025-01-24 NOTE — LETTER
[address]   [place] [address]  [phone]    Date: 01/28/25    Vero Quijano  [address] [place] [address]    Dear Vero:                                                                                                                                Thank you for choosing [facility] emergency department for care.  Your primary care provider wants to make sure that your ongoing medical care is being addressed. If you require follow up care as a result of your emergency department visit, there are a few things the practice would like you to know.                As part of the network's continuing commitment to caring for our patients, we have added more same day appointments and have extended office hours to meet your medical needs. After hours, on-call physicians are available via your primary care provider's main office line.               We encourage you to contact our office prior to seeking treatment to discuss your symptoms with the medical staff.  Together, we can determine the correct course of action.  A majority of non-emergent conditions such as: common cold, flu-like symptoms, fevers, strains/sprains, dislocations, minor burns, cuts and animal bites can be treated at [facility] facilities. Diagnostic testing is available at some sites.               Of course, if you are experiencing a life threatening medical emergency call 911 or proceed directly to the nearest emergency room.    Your nearest [facility] facility is conveniently located at:    [facility]  [address]  [place] [address]  [phone]  SKIP THE WAIT  Conveniently offered at most [facility] locations  [place] your spot online at [email or web address] or on the [facility] Crist[facility]    Sincerely,    [facility]  Dept: [phone]

## 2025-01-24 NOTE — ED PROVIDER NOTES
Time reflects when diagnosis was documented in both MDM as applicable and the Disposition within this note       Time User Action Codes Description Comment    1/23/2025  4:28 PM Juan Antonio Dowling Add [Z76.0] Medication refill           ED Disposition       ED Disposition   Discharge    Condition   Stable    Date/Time   Thu Jan 23, 2025  4:28 PM    Comment   Vero Quijano discharge to home/self care.                   Assessment & Plan       Medical Decision Making  Patient is a 49-year-old female coming in for medication refills on her Invega.  Is in no acute distress at this time.  Did send a prescription for Invega to her pharmacy.  Patient requested a refill, however were not filled at this time.  Recommend the patient follow-up with psychiatrist, though if she does need a refill in 1 month, she can come back for reevaluation at that time.  Patient has no other complaints at this time, and is not a danger to herself or others at this time    Risk  Prescription drug management.             Medications - No data to display    ED Risk Strat Scores                                              History of Present Illness       Chief Complaint   Patient presents with    Medication Refill     Asking for a refill for invega       Past Medical History:   Diagnosis Date    Acute hypoxemic respiratory failure (HCC) 3/27/2021    Schizoaffective disorder (HCC)       History reviewed. No pertinent surgical history.   History reviewed. No pertinent family history.   Social History     Tobacco Use    Smoking status: Every Day     Current packs/day: 1.00     Average packs/day: 1 pack/day for 35.0 years (35.0 ttl pk-yrs)     Types: Cigarettes    Smokeless tobacco: Never    Tobacco comments:     Pt states 18 cigarettes per day   Vaping Use    Vaping status: Never Used   Substance Use Topics    Alcohol use: Never    Drug use: No      E-Cigarette/Vaping    E-Cigarette Use Never User       E-Cigarette/Vaping Substances    Nicotine No      THC No     CBD No     Flavoring No     Other No     Unknown No       I have reviewed and agree with the history as documented.     Patient is a 49-year-old female with a past medical history significant for schizoaffective, coming in requesting a prescription for Invega to be sent to her pharmacy.  Reports that her last dose was approximately 1 month ago.  Reports that she does have an upcoming appointment with her psychiatrist in approximately 2 months, and is attempted to continue her medications until then.  Patient denies any suicidal thoughts      Medication Refill      Review of Systems   Constitutional:  Negative for chills, fatigue and fever.   Gastrointestinal:  Negative for abdominal pain, nausea and vomiting.   Psychiatric/Behavioral:  Negative for self-injury and suicidal ideas.            Objective       ED Triage Vitals [01/23/25 1611]   Temperature Pulse Blood Pressure Respirations SpO2 Patient Position - Orthostatic VS   98.3 °F (36.8 °C) 103 164/94 18 95 % Sitting      Temp Source Heart Rate Source BP Location FiO2 (%) Pain Score    Oral -- Left arm -- No Pain      Vitals      Date and Time Temp Pulse SpO2 Resp BP Pain Score FACES Pain Rating User   01/23/25 1611 98.3 °F (36.8 °C) 103 95 % 18 164/94 No Pain -- SN            Physical Exam  Vitals reviewed.   Constitutional:       Appearance: Normal appearance. She is normal weight.   HENT:      Head: Normocephalic and atraumatic.      Right Ear: External ear normal.      Left Ear: External ear normal.      Nose: Nose normal.   Eyes:      Conjunctiva/sclera: Conjunctivae normal.   Cardiovascular:      Rate and Rhythm: Normal rate.   Pulmonary:      Effort: Pulmonary effort is normal.   Musculoskeletal:         General: Normal range of motion.      Cervical back: Normal range of motion.   Skin:     General: Skin is warm and dry.   Neurological:      Mental Status: She is alert.   Psychiatric:         Thought Content: Thought content does not include  suicidal ideation. Thought content does not include suicidal plan.         Results Reviewed       None            No orders to display       Procedures    ED Medication and Procedure Management   Prior to Admission Medications   Prescriptions Last Dose Informant Patient Reported? Taking?   Invega Sustenna 234 MG/1.5ML IM injection   Yes No   Si mg by IM- Deltoid route every 30 (thirty) days   Invega Sustenna 234 MG/1.5ML IM injection   No Yes   Si.5 mL (234 mg total) by Intramuscular (deltoid only) route every 30 (thirty) days   diphenhydrAMINE (BENADRYL) 50 mg capsule   Yes No   Sig: Take 50 mg by mouth daily at bedtime   docusate sodium (COLACE) 100 mg capsule   No No   Sig: Take 1 capsule (100 mg total) by mouth 2 (two) times a day as needed for constipation Hold for lose stool   Patient not taking: Reported on 10/29/2024   polyethylene glycol (GLYCOLAX) 17 GM/SCOOP powder   No No   Sig: Drink 1 scoop with 8 oz of water   Patient not taking: Reported on 10/29/2024   risperiDONE (RisperDAL) 2 mg tablet   Yes No   Sig: Take 2 mg by mouth every 12 (twelve) hours      Facility-Administered Medications: None     Discharge Medication List as of 2025  4:30 PM        CONTINUE these medications which have CHANGED    Details   Invega Sustenna 234 MG/1.5ML IM injection 1.5 mL (234 mg total) by Intramuscular (deltoid only) route every 30 (thirty) days, Starting u 2025, Normal           CONTINUE these medications which have NOT CHANGED    Details   diphenhydrAMINE (BENADRYL) 50 mg capsule Take 50 mg by mouth daily at bedtime, Starting Mon 2024, Historical Med      docusate sodium (COLACE) 100 mg capsule Take 1 capsule (100 mg total) by mouth 2 (two) times a day as needed for constipation Hold for lose stool, Starting Mon 2024, Normal      polyethylene glycol (GLYCOLAX) 17 GM/SCOOP powder Drink 1 scoop with 8 oz of water, Normal      risperiDONE (RisperDAL) 2 mg tablet Take 2 mg by mouth every 12  (twelve) hours, Starting Sat 8/17/2024, Historical Med           No discharge procedures on file.  ED SEPSIS DOCUMENTATION   Time reflects when diagnosis was documented in both MDM as applicable and the Disposition within this note       Time User Action Codes Description Comment    1/23/2025  4:28 PM Juan Antonio Dowling Add [Z76.0] Medication refill                  Juan Antonio Dowling PA-C  01/23/25 1906

## 2025-01-27 NOTE — TELEPHONE ENCOUNTER
01/27/25 9:17 AM    Patient contacted post ED visit, outreach attempt made but message could not be left. Additional outreach attempt will be made.     Thank you.  Salma Amaya MA  [facility]

## 2025-01-28 NOTE — TELEPHONE ENCOUNTER
01/28/25 9:19 AM    Patient contacted post ED visit, phone outreaches were unsuccessful and a MyChart letter has been sent to the patient as follow-up.    Thank you.  Salma Amaya MA  [facility]

## 2025-01-29 NOTE — TELEPHONE ENCOUNTER
01/29/25 10:25 AM    Patient contacted post ED visit, [facility] department spoke with patient/caregiver and outreach was successful.    Thank you.  Salma Amaya MA  [facility]

## 2025-03-28 ENCOUNTER — HOSPITAL ENCOUNTER (EMERGENCY)
Facility: HOSPITAL | Age: 50
Discharge: HOME | End: 2025-03-28
Attending: EMERGENCY MEDICINE
Payer: MEDICARE

## 2025-03-28 VITALS
HEART RATE: 112 BPM | WEIGHT: 188.05 LBS | TEMPERATURE: 98.9 F | RESPIRATION RATE: 20 BRPM | BODY MASS INDEX: 32.79 KG/M2 | SYSTOLIC BLOOD PRESSURE: 161 MMHG | OXYGEN SATURATION: 96 % | DIASTOLIC BLOOD PRESSURE: 99 MMHG

## 2025-03-28 DIAGNOSIS — Z76.0 ENCOUNTER FOR MEDICATION REFILL: Primary | ICD-10-CM

## 2025-03-28 DIAGNOSIS — Z76.0 MEDICATION REFILL: ICD-10-CM

## 2025-03-28 PROCEDURE — 99282 EMERGENCY DEPT VISIT SF MDM: CPT

## 2025-03-28 PROCEDURE — 99284 EMERGENCY DEPT VISIT MOD MDM: CPT

## 2025-03-28 RX ORDER — PALIPERIDONE PALMITATE 234 MG/1.5ML
234 INJECTION INTRAMUSCULAR
Qty: 1 ML | Refills: 0 | Status: SHIPPED | OUTPATIENT
Start: 2025-03-28

## 2025-03-28 NOTE — ED PROVIDER NOTES
"Time reflects when diagnosis was documented in both MDM as applicable and the Disposition within this note       Time User Action Codes Description Comment    3/28/2025 11:18 AM Gila Bernal [Z76.0] Medication refill     3/28/2025 11:19 AM Gabe Gila Add [Z76.0] Encounter for medication refill           ED Disposition       ED Disposition   Discharge    Condition   Stable    Date/Time   Fri Mar 28, 2025 11:19 AM    Comment   Vero Quijano discharge to home/self care.                   Assessment & Plan       Medical Decision Making  50 year old female for medication refill of Invega.   Per chart review she does have it in her medication list, and did attempt to see a provider today but her appointment was cancelled.   No acute symptoms.   Medication sent to the pharmacy.   Pt stable at time of discharge, vital signs reviewed, questions answered. Strict ER return precautions provided/discussed and were well understood by patient. Patient's vitals, labs and/or imaging results, diagnosis, and treatment plan were discussed with the patient. All new and/or changed medications were discussed - specifically to include route of administration, how often to take, when to take, and the pharmacy they were sent to. Strict return precautions as well as close follow up with PCP was discussed with the patient and the patient was agreeable to my recommendations.  Patient verbally acknowledged understanding. All labs, imaging were reviewed and used in the medical decision making process (if ordered).     Portions of this chart may have been written with voice recognition software.  Occasional grammatical errors, wrong word or \"sound a like\" substitutions may have occurred due to software limitations.  Please read carefully and use context to recognize where substitutions have occurred.        Problems Addressed:  Encounter for medication refill: acute illness or injury    Amount and/or Complexity of Data " Reviewed  External Data Reviewed: notes.    Risk  Prescription drug management.             Medications - No data to display    ED Risk Strat Scores                            SBIRT 20yo+      Flowsheet Row Most Recent Value   Initial Alcohol Screen: US AUDIT-C     1. How often do you have a drink containing alcohol? 0 Filed at: 03/28/2025 1107   2. How many drinks containing alcohol do you have on a typical day you are drinking?  0 Filed at: 03/28/2025 1107   3a. Male UNDER 65: How often do you have five or more drinks on one occasion? 0 Filed at: 03/28/2025 1107   3b. FEMALE Any Age, or MALE 65+: How often do you have 4 or more drinks on one occassion? 0 Filed at: 03/28/2025 1107   Audit-C Score 0 Filed at: 03/28/2025 1107   AMRIT: How many times in the past year have you...    Used an illegal drug or used a prescription medication for non-medical reasons? Never Filed at: 03/28/2025 1107                            History of Present Illness       Chief Complaint   Patient presents with    Medication Problem     States that her doctor is not in office today and she needs a dose of her invega sustenna       Past Medical History:   Diagnosis Date    Acute hypoxemic respiratory failure (HCC) 3/27/2021    Schizoaffective disorder (HCC)       History reviewed. No pertinent surgical history.   History reviewed. No pertinent family history.   Social History     Tobacco Use    Smoking status: Every Day     Current packs/day: 1.00     Average packs/day: 1 pack/day for 35.0 years (35.0 ttl pk-yrs)     Types: Cigarettes     Passive exposure: Never    Smokeless tobacco: Never    Tobacco comments:     Pt states 18 cigarettes per day   Vaping Use    Vaping status: Never Used   Substance Use Topics    Alcohol use: Never    Drug use: No      E-Cigarette/Vaping    E-Cigarette Use Never User       E-Cigarette/Vaping Substances    Nicotine No     THC No     CBD No     Flavoring No     Other No     Unknown No       I have reviewed and  agree with the history as documented.     Vero is a 50 year old female presenting to the ED for a medication refill. Was supposed to be seen by her psychiatrist today but she didn't get notified that her appointment was cancelled until she arrived. They rescheduled for June (two months away). She has no acute symptoms, but needs her Invega injection today and the psychiatrist couldn't fill it since he isn't in the office today.        Review of Systems   Constitutional:  Negative for chills and fever.   Respiratory:  Negative for cough and shortness of breath.    Cardiovascular:  Negative for chest pain and palpitations.   Psychiatric/Behavioral:  Negative for agitation, behavioral problems, confusion, self-injury, sleep disturbance and suicidal ideas. The patient is not nervous/anxious and is not hyperactive.            Objective       ED Triage Vitals [03/28/25 1043]   Temperature Pulse Blood Pressure Respirations SpO2 Patient Position - Orthostatic VS   98.9 °F (37.2 °C) (!) 112 161/99 20 96 % Sitting      Temp Source Heart Rate Source BP Location FiO2 (%) Pain Score    Oral Monitor Right arm -- --      Vitals      Date and Time Temp Pulse SpO2 Resp BP Pain Score FACES Pain Rating User   03/28/25 1043 98.9 °F (37.2 °C) 112 96 % 20 161/99 -- -- ES            Physical Exam  Vitals reviewed.   Constitutional:       General: She is not in acute distress.     Appearance: Normal appearance. She is not ill-appearing or toxic-appearing.   HENT:      Head: Normocephalic and atraumatic.      Right Ear: External ear normal.      Left Ear: External ear normal.      Nose: Nose normal.   Cardiovascular:      Rate and Rhythm: Normal rate and regular rhythm.      Pulses: Normal pulses.   Pulmonary:      Effort: Pulmonary effort is normal. No respiratory distress.   Musculoskeletal:         General: Normal range of motion.      Cervical back: Normal range of motion and neck supple.   Skin:     General: Skin is warm and dry.       Capillary Refill: Capillary refill takes less than 2 seconds.   Neurological:      General: No focal deficit present.      Mental Status: She is alert.   Psychiatric:         Attention and Perception: Attention and perception normal.         Mood and Affect: Mood normal.         Speech: Speech normal.         Behavior: Behavior normal.         Thought Content: Thought content normal. Thought content does not include homicidal or suicidal ideation. Thought content does not include homicidal or suicidal plan.         Cognition and Memory: Cognition and memory normal.         Judgment: Judgment normal.         Results Reviewed       None            No orders to display       Procedures    ED Medication and Procedure Management   Prior to Admission Medications   Prescriptions Last Dose Informant Patient Reported? Taking?   Invega Sustenna 234 MG/1.5ML IM injection   No No   Si.5 mL (234 mg total) by Intramuscular (deltoid only) route every 30 (thirty) days   Invega Sustenna 234 MG/1.5ML IM injection   No Yes   Si.5 mL (234 mg total) by Intramuscular (deltoid only) route every 30 (thirty) days   diphenhydrAMINE (BENADRYL) 50 mg capsule   Yes No   Sig: Take 50 mg by mouth daily at bedtime   docusate sodium (COLACE) 100 mg capsule   No No   Sig: Take 1 capsule (100 mg total) by mouth 2 (two) times a day as needed for constipation Hold for lose stool   Patient not taking: Reported on 10/29/2024   polyethylene glycol (GLYCOLAX) 17 GM/SCOOP powder   No No   Sig: Drink 1 scoop with 8 oz of water   Patient not taking: Reported on 10/29/2024   risperiDONE (RisperDAL) 2 mg tablet   Yes No   Sig: Take 2 mg by mouth every 12 (twelve) hours      Facility-Administered Medications: None     Discharge Medication List as of 3/28/2025 11:25 AM        CONTINUE these medications which have CHANGED    Details   Invega Sustenna 234 MG/1.5ML IM injection 1.5 mL (234 mg total) by Intramuscular (deltoid only) route every 30 (thirty)  days, Starting Fri 3/28/2025, Normal           CONTINUE these medications which have NOT CHANGED    Details   diphenhydrAMINE (BENADRYL) 50 mg capsule Take 50 mg by mouth daily at bedtime, Starting Mon 8/5/2024, Historical Med      docusate sodium (COLACE) 100 mg capsule Take 1 capsule (100 mg total) by mouth 2 (two) times a day as needed for constipation Hold for lose stool, Starting Mon 8/26/2024, Normal      polyethylene glycol (GLYCOLAX) 17 GM/SCOOP powder Drink 1 scoop with 8 oz of water, Normal      risperiDONE (RisperDAL) 2 mg tablet Take 2 mg by mouth every 12 (twelve) hours, Starting Sat 8/17/2024, Historical Med           No discharge procedures on file.  ED SEPSIS DOCUMENTATION   Time reflects when diagnosis was documented in both MDM as applicable and the Disposition within this note       Time User Action Codes Description Comment    3/28/2025 11:18 AM Gila Bernal [Z76.0] Medication refill     3/28/2025 11:19 AM Gila Bernal [Z76.0] Encounter for medication refill                  Gila Bernal PA-C  03/28/25 1137

## 2025-04-08 DIAGNOSIS — K59.09 CHRONIC CONSTIPATION: ICD-10-CM

## 2025-04-09 RX ORDER — POLYETHYLENE GLYCOL 3350 17 G/17G
POWDER, FOR SOLUTION ORAL
Qty: 510 G | Refills: 0 | Status: SHIPPED | OUTPATIENT
Start: 2025-04-09

## 2025-04-09 RX ORDER — DOCUSATE SODIUM 100 MG/1
100 CAPSULE, LIQUID FILLED ORAL 2 TIMES DAILY PRN
Qty: 10 CAPSULE | Refills: 0 | Status: SHIPPED | OUTPATIENT
Start: 2025-04-09

## 2025-05-22 ENCOUNTER — DOCUMENTATION (OUTPATIENT)
Dept: ADMINISTRATIVE | Facility: OTHER | Age: 50
End: 2025-05-22

## 2025-05-22 NOTE — PROGRESS NOTES
05/22/25 9:39 AM     DM A1c outreach is not required,   Attribution       Thank you.  LADAN PHOENIX MA  [facility]

## 2025-05-26 ENCOUNTER — APPOINTMENT (EMERGENCY)
Dept: RADIOLOGY | Facility: HOSPITAL | Age: 50
End: 2025-05-26
Payer: MEDICARE

## 2025-05-26 ENCOUNTER — HOSPITAL ENCOUNTER (EMERGENCY)
Facility: HOSPITAL | Age: 50
Discharge: HOME | End: 2025-05-26
Attending: EMERGENCY MEDICINE | Admitting: EMERGENCY MEDICINE
Payer: MEDICARE

## 2025-05-26 VITALS
RESPIRATION RATE: 18 BRPM | SYSTOLIC BLOOD PRESSURE: 158 MMHG | WEIGHT: 172.84 LBS | DIASTOLIC BLOOD PRESSURE: 73 MMHG | TEMPERATURE: 98.2 F | OXYGEN SATURATION: 96 % | BODY MASS INDEX: 30.14 KG/M2 | HEART RATE: 129 BPM

## 2025-05-26 DIAGNOSIS — R05.9 COUGH: ICD-10-CM

## 2025-05-26 DIAGNOSIS — Z76.0 MEDICATION REFILL: ICD-10-CM

## 2025-05-26 DIAGNOSIS — Z76.0 ENCOUNTER FOR MEDICATION REFILL: Primary | ICD-10-CM

## 2025-05-26 DIAGNOSIS — F25.0 SCHIZOAFFECTIVE DISORDER, BIPOLAR TYPE (HCC): ICD-10-CM

## 2025-05-26 PROCEDURE — 71046 X-RAY EXAM CHEST 2 VIEWS: CPT

## 2025-05-26 PROCEDURE — 99285 EMERGENCY DEPT VISIT HI MDM: CPT

## 2025-05-26 PROCEDURE — 99284 EMERGENCY DEPT VISIT MOD MDM: CPT

## 2025-05-26 RX ORDER — PALIPERIDONE PALMITATE 234 MG/1.5ML
234 INJECTION INTRAMUSCULAR
Qty: 1.5 ML | Refills: 1 | Status: SHIPPED | OUTPATIENT
Start: 2025-05-26 | End: 2025-06-02 | Stop reason: SDUPTHER

## 2025-05-26 RX ORDER — GUAIFENESIN 600 MG/1
1200 TABLET, EXTENDED RELEASE ORAL EVERY 12 HOURS SCHEDULED
Qty: 28 TABLET | Refills: 0 | Status: SHIPPED | OUTPATIENT
Start: 2025-05-26 | End: 2025-06-02

## 2025-05-27 ENCOUNTER — VBI (OUTPATIENT)
Dept: FAMILY MEDICINE CLINIC | Facility: CLINIC | Age: 50
End: 2025-05-27

## 2025-05-27 NOTE — ED NOTES
Patient not liking pulse oximetry on her finger - states she doesn't like how it feels and took it off. Patient was given her results of her xray and discharge instructions by the provider and discharged by same.     Sravani Nunez RN  05/26/25 4825
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
## 2025-06-01 NOTE — BH TREATMENT PLAN
TREATMENT PLAN        [facility]    Name and Date of Birth:  Vero Quijano 50 y.o. 1975  Date of Treatment Plan: June 2, 2025  Diagnosis/Diagnoses:    1. Schizoaffective disorder, unspecified type (HCC)    2. Borderline personality disorder (HCC)    3. Tobacco abuse    4. Medication refill        Strengths/Personal Resources for Self-Care: supportive friends, ability to reason, ability to communicate needs, willingness to work on problems, ability to understand psychiatric illness, taking medications as prescribed, good physical health, motivation for treatment    Area/Areas of need: anxiety, depression, mood swings, schizophrenia symptoms and psychosis    Long Term Goal: maintain improvement in anxiety, maintain improvement in depression, maintain improvement in mood stability, improve psychotic symptoms, and improve paranoid thoughts  Target Date: 6 months - December 2, 2025  Person/Persons responsible for completion of goal: Vero and Alirio Santizo MD     Short Term Objective (s) - How will we reach this goal?:   Take medications as prescribed  Attend psychiatry appointments regularly  Get blood work drawn  Continue psychotherapy regularly  Practice coping skills  Try sleep hygiene techniques  Avoid alcohol   Avoid drugs   Take walks regularly  Try breathing exercises  Try relaxation techniques  Target Date: 6 months - December 2, 2025  Person/Persons Responsible for Completion of Goal: Vero     Progress Towards Goals: Continuing treatment    Treatment Modality: medication management every 1-3 months as needed, continue psychotherapy (if patient is currently involved in therapy), and follow up with PCP regularly  Review due 180 days from date of this plan: November 29, 2025   Expected length of service: Ongoing treatment    My physician and I have developed this plan together, and I agree to work on the goals and objectives. I understand the treatment  goals that were developed for my treatment.    The treatment plan was created between Alirio Santizo MD and Vero Quijano on 06/02/25 at 4:02 PM but not signed at the time of the visit due to COVID social distancing. The plan was reviewed, and verbal consent was given.

## 2025-06-01 NOTE — PSYCH
"Psychiatric Evaluation - Behavioral Health  MRN: [id number]  Visit Time  Start: 1500 (3:00 pm)   Stop: 1553  Total: ~53 minutes.    Assessment & Plan   Vero Quijano is a 50 y.o. female, Single with PPHx of schizoaffective disorder, delusional disorder, borderline personality disorder, tobacco abuse, multiple hospitalizations for inpatient behavioral health, 3x [facility] and PMHx including diabetes mellitus, stress incontinence, hypokalemia; with suicide risk factors including chronic mental illness, medical problems, financial problems, limited social/emotional support, anxiety, impulsivity, psychosis, age (50+), and never ; presenting today (06/01/25) with a main concern of schizoaffective disorder.     Patient has a long history of schizoaffective disorder and states she often gets inpatient hospital involuntarily on a yearly basis and has been to [facility] 3 times total, once for 2 years at [facility].    Patient arrived to the office goal-directed focused on getting medication refill for Invega Sustenna which she reports is an effective medication and has been present for a long time, getting monthly refills on the 27th of every month.  She has her refills sent to [place] [facility] where they, as she reports, provide the injection of the medication.  As far as stability goes, she feels at baseline and while she has significant number of delusions, mild disorganization, and moderate to mild paranoia she feels that she is in control of depression, anxiety, mood swings, and overall mood and feels that despite her \"delusions\" that she feels nobody believes she would like to continue all medications as prescribed and have close follow-ups in order to monitor how she is doing.  Adamantly denies any desires for self-harm and denies any SI, HI, AVH.      Patient did show up to the appointment tangential, showing photos on her phone, pointing out and blurry images that " "people are watching her but she is not bothered by them.  She does feel frustrated that no one \"believes my delusions\" but she is okay with this and we discussed that everyone thinks differently and she agreed.  She does note that despite her many hospitalizations, several stays at [facility] hospital, her diagnosis of schizoaffective disorder is stable and all she needs is a medication refill and a psychiatrist to prescribe it.  She has no side effects and is agreeable with continued medication refills.    Assessment & Plan  Schizoaffective disorder, unspecified type (HCC)  Status: At goal - many continued delisuons and mild paranoia, at baseline    Continue medications as prescribed  And continue Invega Sustenna 234 mg / 1.5 mL IM injection regularly every 30 days on the 27th of the month,   to be sent to [facility] where they deliver the injection patient reports.  Has been on this dosage for an extended period of time  At baseline has numerous delusions, mild paranoia       Borderline personality disorder (HCC)  By history       Tobacco abuse  Educated on cessation       Medication refill         Medical: Follow up with primary care physician and specialists for ongoing medical care.      Labs: Reviewed.  Continue monitoring CBC/diff, CMP, lipid profile, hemoglobin A1C, TSH and free T4 every 6 months.    Further Recommendations / Precautions / Counseling:  EXERCISE / DIET: The importance of regular exercise/physical activity was discussed. Recommend exercise 3-5 times per week for at least 30 minutes. This writer recommended incorporation of a more whole foods plant-predominant diet in addition to decreasing consumption of red meats and processed food. Per AHA guidelines, this writer recommended patient participation in a moderate-vigorous intensity exercise for 30 minutes a day for 5 days a week or a total of 150 minutes/week.  Patient is receptive to recommendations regarding appropriate dietary and " "lifestyle modifications.  NICOTINE: The patient was educated about the risk of smoking, and its negative effects on health; options regarding smoking cessation (including nicotine replacement therapy and medication management) were offered. The patient stated that @HE@ is not interested in quitting at the moment, but will consider.    -----------------------------------    Chief Complaint: \"I'm dealing with paranoia activities and abnormal problems - delusion\"    Per chart review:: Patient was recently at the emergency department on 5/26/2025 in order to obtain refill for her Invega      Per chart review during intake:: \"Presenting Problem (in patient's own words):      Patient stated that she is looking for a specific provider due to her negative experiences due to the color of her skin and her education. Patient stated that she was treated poorly due to her mental illness. Patient stated that she had a hidden camera installed in her [facility] room. Patient is requesting for a provider that is culturally competent and multiculturalism awareness. Patient wants an American provider and not someone who is .     Patient stated that in 1998 she had a suicide attempt and was in [facility] for 2 years. Patient was harassed by neighbors who were , which caused her problems. Patient suffers from insomnia.      Patient stated that she gets checked into the hospital and is 302'd about once a year. Patient states doctors are not happy with her behavior and how she presents herself. Patient believes that it is due to her culture and personality.     Patient has been getting medication by a  since 22yo for anti-psychotics for Schizo-Affective Disorder.\"      History of Present Illness  and ROS    Per chart review recent emergency department visit on 1/22/2024:: \"Patient is a 48-year-old female with a past psychiatric history of schizoaffective disorder.  911 was called to her home by family.  " "Police and EMS found her in her bathroom screaming that her mother was going to kill her and that she is hearing voices.  She was combative with police and EMS and refused to leave the bathroom.  Eventually they were able to get her handcuffed and put into a stretcher and brought to the hospital.  In the hospital she is displaying a tangential and disorganized thought process.  She has rapid and pressured speech.  She is disorganized in her behavior.  She is expressing paranoid  delusions.  She expresses that she believes hospital staff is sent by the devil to harm her.  During exam patient was refusing to be examined and refusing any medications.  Patient was asked if she was allergic to Haldol and she responded that in the 90s \"demons took over my face when they gave me Haldol.\"  Patient described acute dystonic symptoms when she was last given Haldol.     This note writer spoke with patient's mother Shanna.  She states that her daughter used to live in a group home but her mother was concerned about the conditions of this home so she brought her to stay with her.  She has been living with mom for 8 months and mom states that through these 8 months she has been in a constant state of psychosis.  She states that she gets Invega Trinza but that this does not help her.  She states that she has been concerned about her daughter's behavior.  She describes that her daughter believes their downstairs neighbors in their condo complex are plotting against her and wanting to harm her.  She believes that they are planning sounds only she can hear.  Her mother endorses other auditory hallucinations in the form of hearing people that are not there.  She states that her daughter does respond to the voices she hears.  She believes that she is also experiencing visual hallucinations because she is stated that she is seeing people that her mother cannot see.  She also has been mostly staying in her living room and kitchen because " "she believes these are the only places in her house that prevent the \"invisible people\" from getting to her.  She also has told her mother that her mother and the downstairs neighbors are not actually who they say they are in her impostors sent by the \"Coven.\"  Her mother describes that the Coven is a group of people from another dimension sent to harm her and also sending people to replace people in her life.  She adds that her daughter had a suicide attempt in 1997 when she attempted to self immolate. Her mother also states that her daughter has expressed desire to kill her neighbors.    A 2 doctor 302 has been petitioned.  302 paperwork ended up to nursing\"      Vero Quijano reports that she is doing well and continues to have mild paranoia symptoms and delusions that have been stable for a long period of time.  She states she has never had auditory or visual hallucinations but does get confused at times.  She feels the medication is working and she would like refills.    Stressors: stable and manageable  Medication Side Effects: denies any side effects from medications.    Sleep:    normal sleep; adequate number of sleep hours  Depression:   Timeline: started experiencing symptoms around age <9 yo; slowly worsening over time currently STABLE on medications  Current depression: 0/10 (10 worst).  See PHQ-9 depression scale below, if applicable  DENIES neurovegetative symptomatology suggestive of major depressive disorder or dysthymia. Patient denies fragmented or non-restorative sleep. Patient endorses robust appetite, baseline energy, and no impairment of motivation. Patient reports adequate concentration/memory and denies new-onset forgetfulness or inattentiveness. Patient does not experience daily crying spells or limited pleasure in activities previously found pleasurable. Patient adamantly denies acute thoughts of suicide or self-harm. Patient has no plans to harm others. There is no documented history of " "prior suicidal gestures or suicidal attempts. Patient denies historical non-suicidal self injurious behavior. Patient is future-oriented and demonstrates self preservation as evidenced by today's evaluation in which Patient is seeking psychiatric intervention to improve overall mental health and outlook on life. Patient denies a pervasive history of worthlessness, hopelessness, or guilt.    Anxiety:    Timeline: N/A  Current anxiety: 0/10 (10 worst).  See MARSHALL-7 anxiety scale below, if applicable  DENIES excessive nervousness, irrational worry, or overt anxiousness. Patient is not pervasively restless or tense nor does Patient feel \"keyed up\" or chronically on-edge. Patient does not experience disruption in energy or concentration secondary to baseline anxiety. There is no evidence to suggest that Patient experiences irritability, inability to relax, or disruption in sleep secondary to baseline, non-pathologic anxiety. Patient denies new-onset panic symptomatology or maladaptive behaviors. Throughout today's session, Patient does not appear visibly perturbed. --------- Patient currently endorses occasional and appropriate anxiety that is NOT PATHOLOGIC in nature.    Panic attacks:   DENIES diagnosis of panic attacks  Timeline: N/A  Typical symptoms: no symptoms suggestive of panic disorder.   PTSD:   Exposure to trauma involving: patient denies  Timeline: N/A  DENIES historical symptomatology suggestive of PTSD  Bipolar:   ENDORSES historical symptomatology suggestive of hypomanic, manic or mixed episodes.  Per chart review, POSITIVE history of full hypomanic, manic or mixed episodes.  has been HOSPITALIZED for a hypomanic/manic episode  OCD Symptoms:   No significant symptoms suggestive of OCD diagnosis  Timeline: N/A  Psychotic symptoms: Positive history of schizoaffective disorder, currently stable.  She is currently on Invega Sustenna long-acting injectable. Wants refills sent to [facility]  the " "patient reports ENDORSES historical symptomatology suggestive of psychosis symptoms, auditory hallucinations (to harm self), paranoid ideation, delusions, and history of psychosis symptoms for an extended period of time in the setting of NO mood symptoms (schizoaffective bipolar) - these epsiodes sometimes are not present and she continues to have psycosis   Social Anxiety   symptoms: no symptoms suggestive of social anxiety  ADHD:   DENIES historical symptomatology suggestive of ADHD  Eating Disorder:   no historical or current eating disorder. no anorexia nervosa; no symptoms of bulimia; no binge eating disorder  Personality Disorder history:   No reported history of personality disorders.    Rating Scales  None completed today.  Not Applicable    Psychiatric Review Of Systems:  Vero reports Symptoms as described in HPI.  Vero denies Current suicidal thoughts, plan, or intent, Current thoughts of self-harm, or Current homicidal thoughts, plan, or intent.    Medical Review Of Systems:  Complete review of systems is negative except as noted above.    ---------------------------------------------------  History gathered via chart review and through patient interview.    Psychiatric History:     Past Inpatient / Other Psychiatric Treatment:   Yes, past inpatient psychiatric admission(s)  2.5 years at [facility] - \". [facility] on January 26th 1999.  [facility] x3 for about 2.5 years, per chart review  \"Patient stated that she gets checked into the hospital and is 302'd about once a year.\"  Past Outpatient Psychiatric Treatment:   Prior psychiatry and therapy:   Was in outpatient psychiatric treatment in the past with a psychiatrist  Current therapy:    No, would not like to see a therapist at this point in time, but is open to reconsideration  Past Suicide Attempts / self harm behaviors:  Self harm: 1998 " "suicide attempt when psychotic [facility].  Suicide attempt: one attempt;  Past Violent Behavior: patient denies  Past Psychiatric Medication Trials:  patient does not remember and multiple psychiatric medication trials  Hydroxyzine  Lorazepam  Trazodone  Haldol  Zyprexa  Invega Sustenna  Invega p.o.  Seroquel  Risperdal    Substance Abuse History:    I have assessed this patient for substance use within the past 12 months    Tobacco use: Current smoker, averages 1 PPD;  Alcohol use: denies use  Cannabis use: denies history of cannabis use in any form.  Other illicit substance use: patient denies  History of Inpatient/Outpatient rehabilitation / detox program: patient denies    Family Psychiatric History:   Patient denies any known family history of psychiatric illness, suicide attempt, or substance abuse outside of the below reported:  Psychiatric Illness:  unknown  Suicide attempts:  unknown  Substance abuse:   unknown    Social History  Marital history: single  Children: no  Living arrangement: currently lives with Shanna in a cond who is an RN in [place] (used to give the injection)  Support system: good support system  Education: college graduate   Learning Disabilities: none  Occupational History: unemployed on disability  Legal History: Per chart review:: \"Previous charges of attempting to cause a catastrophe and endangering others \" - none currenrly  Access to firearms: patient denies      Traumatic History:   Abuse / Traumatic events: NO emotional, verbal, physical, or sexual abuse hx, NO traumatic events    Past Medical History:  Eating Disorder: no historical or current eating disorder.   Seizures: patient denies  Head injury / Concussion: no history of head injury or concussions  JOSEPHINE: Denies history of snoring, apneas, daytime tiredness, or any history of sleep apnea    EKG, Pathology, and Other Studies: Reviewed.    --------------------------------------  Past Medical History[1]   Past Surgical " "History[2]  Family History[3]    The following portions of the patient's history were reviewed and updated as appropriate: allergies, current medications, past family history, past medical history, past social history, past surgical history, and problem list.    Visit Vitals  OB Status Unknown   Smoking Status Every Day      Wt Readings from Last 6 Encounters:   05/26/25 78.4 kg (172 lb 13.5 oz)   03/28/25 85.3 kg (188 lb 0.8 oz)   01/23/25 88 kg (194 lb)   10/29/24 87.1 kg (192 lb)   08/26/24 87.1 kg (192 lb)   01/22/24 105 kg (231 lb 11.3 oz)        Mental Status Exam:  Appearance:  alert, good eye contact, appears stated age, casually dressed, and marginal grooming/hygiene   Behavior:  calm and limited cooperativity   Motor: no abnormal movements and normal gait and balance   Speech:  spontaneous, soft, and coherent   Mood:  euthymic and \"good\" \"frustrated people dont believe my delusions\"   Affect:  constricted   Thought Process:  generally linear and goal-directed but tangential,disorganized  at times   Thought Content: delusions of multiple topics Shanna mother, hackers on phone, frustration with people not believing her delusions, mild paranoia   Perceptual disturbances: no reported hallucinations and does not appear to be responding to internal stimuli at this time   Risk Potential: No active or passive suicidal or homicidal ideation was verbalized during interview   Cognition: oriented to self and situation, appears to be of average intelligence, and cognition not formally tested   Insight:  Fair   Judgment: Fair     Meds / Allergies  Allergies[4]  Current Outpatient Medications   Medication Instructions    diphenhydrAMINE (BENADRYL) 50 mg, Daily at bedtime    docusate sodium (COLACE) 100 mg, Oral, 2 times daily PRN, Hold for lose stool    guaiFENesin (MUCINEX) 1,200 mg, Oral, Every 12 hours scheduled    Invega Sustenna 234 mg, Intramuscular (deltoid only), Every 30 days    polyethylene glycol (GLYCOLAX) " 17 GM/SCOOP powder DRINK 1 SCOOP WITH 8 OZ OF WATER    risperiDONE (RISPERDAL) 2 mg, Every 12 hours        Labs & Imaging:  I have personally reviewed all pertinent laboratory tests and imaging results.  No visits with results within 6 Month(s) from this visit.   Latest known visit with results is:   Orders Only on 09/30/2024   Component Date Value Ref Range Status    Total Cholesterol 09/30/2024 162  <200 mg/dL Final    HDL 09/30/2024 28 (L)  > OR = 50 mg/dL Final    Triglycerides 09/30/2024 117  <150 mg/dL Final    LDL Calculated 09/30/2024 112 (H)  mg/dL (calc) Final    Comment: Reference range: <100     Desirable range <100 mg/dL for primary prevention;    <70 mg/dL for patients with CHD or diabetic patients   with > or = 2 CHD risk factors.     LDL-C is now calculated using the Filiberto   calculation, which is a validated novel method providing   better accuracy than the Friedewald equation in the   estimation of LDL-C.   Ziggy MARION et al. NANDA. 2013;310(19): 9733-1281   (http://education.[employer]/faq/ATE022)      Chol HDLC Ratio 09/30/2024 5.8 (H)  <5.0 (calc) Final    Non-HDL Cholesterol 09/30/2024 134 (H)  <130 mg/dL (calc) Final    Comment: For patients with diabetes plus 1 major ASCVD risk   factor, treating to a non-HDL-C goal of <100 mg/dL   (LDL-C of <70 mg/dL) is considered a therapeutic   option.      Creatinine, Urine 09/30/2024 80  20 - 275 mg/dL Final    Albumin,U,Random 09/30/2024 0.2  See Note: mg/dL Final    Comment: Reference Range:    Reference Range  Not established      Microalb/Creat Ratio 09/30/2024 3  <30 mg/g creat Final    Comment:    The ADA defines abnormalities in albumin  excretion as follows:     Albuminuria Category        Result (mg/g creatinine)     Normal to Mildly increased   <30  Moderately increased            Severely increased           > OR = 300     The ADA recommends that at least two of three  specimens collected within a 3-6 month period  be  abnormal before considering a patient to be  within a diagnostic category.      Glucose, Random 09/30/2024 164 (H)  65 - 99 mg/dL Final    Comment:               Fasting reference interval     For someone without known diabetes, a glucose  value >125 mg/dL indicates that they may have  diabetes and this should be confirmed with a  follow-up test.         BUN 09/30/2024 8  7 - 25 mg/dL Final    Creatinine 09/30/2024 0.87  0.50 - 1.03 mg/dL Final    eGFR 09/30/2024 81  > OR = 60 mL/min/1.73m2 Final    SL AMB BUN/CREATININE RATIO 09/30/2024 SEE NOTE:  6 - 22 (calc) Final    Comment:    Not Reported: BUN and Creatinine are within     reference range.            Sodium 09/30/2024 137  135 - 146 mmol/L Final    Potassium 09/30/2024 4.1  3.5 - 5.3 mmol/L Final    Chloride 09/30/2024 102  98 - 110 mmol/L Final    CO2 09/30/2024 29  20 - 32 mmol/L Final    Calcium 09/30/2024 9.0  8.6 - 10.4 mg/dL Final    Protein, Total 09/30/2024 7.2  6.1 - 8.1 g/dL Final    Albumin 09/30/2024 4.2  3.6 - 5.1 g/dL Final    Globulin 09/30/2024 3.0  1.9 - 3.7 g/dL (calc) Final    Albumin/Globulin Ratio 09/30/2024 1.4  1.0 - 2.5 (calc) Final    TOTAL BILIRUBIN 09/30/2024 0.5  0.2 - 1.2 mg/dL Final    Alkaline Phosphatase 09/30/2024 86  37 - 153 U/L Final    AST 09/30/2024 10  10 - 35 U/L Final    ALT 09/30/2024 9  6 - 29 U/L Final    White Blood Cell Count 09/30/2024 8.8  3.8 - 10.8 Thousand/uL Final    Red Blood Cell Count 09/30/2024 5.17 (H)  3.80 - 5.10 Million/uL Final    Hemoglobin 09/30/2024 14.8  11.7 - 15.5 g/dL Final    HCT 09/30/2024 46.2 (H)  35.0 - 45.0 % Final    MCV 09/30/2024 89.4  80.0 - 100.0 fL Final    MCH 09/30/2024 28.6  27.0 - 33.0 pg Final    MCHC 09/30/2024 32.0  32.0 - 36.0 g/dL Final    Comment: For adults, a slight decrease in the calculated MCHC  value (in the range of 30 to 32 g/dL) is most likely  not clinically significant; however, it should be  interpreted with caution in correlation with other  red cell  parameters and the patient's clinical  condition.      RDW 09/30/2024 14.4  11.0 - 15.0 % Final    Platelet Count 09/30/2024 302  140 - 400 Thousand/uL Final    SL AMB MPV 09/30/2024 10.1  7.5 - 12.5 fL Final    Neutrophils (Absolute) 09/30/2024 5,623  1,500 - 7,800 cells/uL Final    Lymphocytes (Absolute) 09/30/2024 2,323  850 - 3,900 cells/uL Final    Monocytes (Absolute) 09/30/2024 598  200 - 950 cells/uL Final    Eosinophils (Absolute) 09/30/2024 211  15 - 500 cells/uL Final    Basophils ABS 09/30/2024 44  0 - 200 cells/uL Final    Neutrophils 09/30/2024 63.9  % Final    Lymphocytes 09/30/2024 26.4  % Final    Monocytes 09/30/2024 6.8  % Final    Eosinophils 09/30/2024 2.4  % Final    Basophils PCT 09/30/2024 0.5  % Final    Hemoglobin A1C 09/30/2024 9.2 (H)  <5.7 % of total Hgb Final    Comment: For someone without known diabetes, a hemoglobin A1c  value of 6.5% or greater indicates that they may have   diabetes and this should be confirmed with a follow-up   test.     For someone with known diabetes, a value <7% indicates   that their diabetes is well controlled and a value   greater than or equal to 7% indicates suboptimal   control. A1c targets should be individualized based on   duration of diabetes, age, comorbid conditions, and   other considerations.     Currently, no consensus exists regarding use of  hemoglobin A1c for diagnosis of diabetes for children.            This test was performed on the Roche duyen c503 platform.  Effective 4/16/24, a change in test platforms from the  Abbott  to the Roche duyen c503 may have shifted  HbA1c results compared to historical results.  Based on laboratory validation testing conducted at  [facility], the Roche platform relative to the Abbott  platform had an average increase in HbA1c value of  < or = 0.3%. This difference is wi                           thin accepted   variability established by the National Glycohemoglobin  Standardization Program. Note that  not all individuals  will have had a shift in their results and direct  comparisons between historical and current results for  testing conducted on different platforms is not  recommended.         -----------------------------------    Medications Risks/Benefits:      Risks, Benefits And Possible Side Effects Of Medications:    Risks, benefits, and alternatives to treatment were discussed and the importance of medication and treatment compliance was reviewed with the patient and they verbalize understanding and agreement for treatment.     Treatment Plan:  The Treatment Plan was completed and signed.. If done today, the next plan will be due November 28, 2025.     The following interventions are recommended: follow-up for regularly scheduled psychiatry appointments (and if needed, agreeable to move appointment sooner), if patient is in psychotherapy, continue with regularly scheduled individual psychotherapy appointments, and contracts for safety at present - agrees to call Crisis Intervention Service or go to ED if feeling unsafe. Although patient's acute lethality risk is LOW, long-term/chronic lethality risk is mildly elevated given the suicide risk factors noted above. However, at the current moment, Vero is future-oriented, forward-thinking, and demonstrates ability to act in a self-preserving manner as evidenced by volitionally seeking psychiatric evaluation and treatment today. To mitigate future risk, patient should adhere to treatment recommendations, avoid alcohol/illicit substance use, utilize community-based resources and familiar support, and prioritize mental health treatment.          Controlled Medication Discussion:   Not applicable - controlled prescriptions are not prescribed by this practice    PDMP Review       None            Suicide/Homicide Risk Assessment:    The following interventions are recommended: continue medication management, continue psychotherapy (if patient is regularly seeing  a therapist), contracts for safety at present - agrees to go to ED if feeling unsafe, contracts for safety at present - agrees to call Crisis Intervention Service if feeling unsafe. Although patient's acute lethality risk is low, long-term/chronic lethality risk is mildly elevated in the presence of the above mentioned psychiatric and psychosocial concerns. At the current moment, Vero is future-oriented, forward-thinking, and demonstrates ability to act in a self-preserving manner as evidenced by volitionally presenting to the clinic today, seeking treatment. At this juncture, inpatient hospitalization is not currently warranted. To mitigate future risk, patient should adhere to the recommendations of this writer, avoid alcohol/illicit substance use, utilize community-based resources and familiar support and prioritize mental health treatment. Based on today's assessment and clinical criteria, Vero Quijano contracts for safety and is not an imminent risk of harm to self or others. Outpatient level of care is deemed appropriate at this present time. Vero understands that if they are no longer able to contract for safety, they need to call/contact the outpatient office including this writer, call/contact crisis and/orattend to the nearest Emergency Department for immediate evaluation.    Presently, patient patient denies suicidal/homicidal ideation in addition to thoughts of self-injury; contracts for safety, see below for risk assessment.  At conclusion of evaluation, patient is amenable to the recommendations of this writer including: starting/continuing/adjusting psychotropic medications as prescribed.  Also, patient is amenable to calling/contacting the outpatient office including this writer if any acute adverse effects of their medication regimen arise in addition to any comments or concerns pertaining to their psychiatric management.  Patient is amenable to calling/contacting crisis and/or attending to the  nearest emergency department if their clinical condition deteriorates to assure their safety and stability, stating that they are able to appropriately confide in their supports regarding their psychiatric state.    -----------------------------------    Medical Decision Making / Counseling / Coordination of Care:  Recent stressors were discussed with the patient. The diagnosis and treatment plan were reviewed with the patient. Risks, benefits, and alternativies to treatment were discussed, including a myriad of potential adverse medication side effects, to which Vero voiced understanding and consented fully to treatment. The importance of medication and treatment compliance was reviewed with the patient. Individual psychotherapy provided: Supportive psychotherapy.     Note: This chart was completed utilizing speech software.  Grammatical errors, random word insertions, pronoun errors, and incomplete sentences are an occasional consequence of the system due to software limitation, ambient noise, and hardware issues.  Any formal questions or concerns about the context, text, or information contained within the body of this dictation should be directly addressed to the physician for clarification.    Alirio Santizo MD    This note was not shared with the patient due to reasonable likelihood of causing patient harm          [1]   Past Medical History:  Diagnosis Date    Acute hypoxemic respiratory failure (HCC) 3/27/2021    Schizoaffective disorder (HCC)    [2] No past surgical history on file.  [3] No family history on file.  [4]   Allergies  Allergen Reactions    Risperidone Shortness Of Breath     Previously tolerated Invega Sustenna and Invega tablets without a problem.    Bacitracin     Effexor [Venlafaxine] Other (See Comments)     Causes elevated glucose    Haloperidol Other (See Comments)     Tardive Dyskinesia      Lexapro [Escitalopram] Other (See Comments)     hyperpigmentation    Zinc     Zyprexa  [Olanzapine] Other (See Comments)     Causes elevated glucose

## 2025-06-02 ENCOUNTER — OFFICE VISIT (OUTPATIENT)
Dept: PSYCHIATRY | Facility: CLINIC | Age: 50
End: 2025-06-02
Payer: MEDICARE

## 2025-06-02 DIAGNOSIS — Z76.0 MEDICATION REFILL: ICD-10-CM

## 2025-06-02 DIAGNOSIS — F60.3 BORDERLINE PERSONALITY DISORDER (HCC): ICD-10-CM

## 2025-06-02 DIAGNOSIS — Z72.0 TOBACCO ABUSE: ICD-10-CM

## 2025-06-02 DIAGNOSIS — F25.9 SCHIZOAFFECTIVE DISORDER, UNSPECIFIED TYPE (HCC): Primary | ICD-10-CM

## 2025-06-02 PROCEDURE — 90792 PSYCH DIAG EVAL W/MED SRVCS: CPT | Performed by: PSYCHIATRY & NEUROLOGY

## 2025-06-02 RX ORDER — PALIPERIDONE PALMITATE 234 MG/1.5ML
234 INJECTION INTRAMUSCULAR
Qty: 1.5 ML | Refills: 3 | Status: SHIPPED | OUTPATIENT
Start: 2025-06-26

## 2025-06-02 NOTE — PATIENT INSTRUCTIONS
"    Please call the office nursing staff for medication issues including refills, problems getting medications, bothersome side effects, etc., at [phone].     Please return for a follow up appointment as discussed and arrive approximately 15 minutes prior to your appointment time. If you are running late or are unable to attend your appointment, please call our [place] office at [phone] (fax: [phone]), or if you were seen in the [facility] office, please call [phone] (fax [phone]).    National Suicide Prevention Hotline: 1-956.422.7915 or call 878.    To improve sleep:  - Keep a consistent sleep schedule. Get up at the same time every day, even on weekends or during vacations.  - Set a bedtime that is early enough for you to get at least 7-8 hours of sleep.  - Don’t go to bed unless you are sleepy.  - If you don’t fall asleep after 20 minutes, get out of bed and take a brief \"break\". Go to a quiet activity without a lot of light exposure. It is especially important to not get on electronics. During this \"break,\" you might consider sitting in a chair and reading a boring book or listening to soft music, until your eyelids start to feel heavy.  Then, would go back to sleep and try again.    - Establish a relaxing bedtime routine.  - Make your bedroom quiet and relaxing. Keep the room at a comfortable, cool temperature.  - Limit exposure to bright light in the evenings.  - Turn off electronic devices at least 30 minutes before bedtime.  - Avoid watching stressful or suspenseful TV programs right before bedtime.  - Don’t eat a large meal before bedtime. If you are hungry at night, eat a light, healthy snack.  - Exercise regularly and maintain a healthy diet.  Participate in regular physical activities like exercise, although avoid approximately 3-4 hours prior to bed.  Morning exercise is ideal.  - Avoid consuming caffeine in the afternoon or evening.  - Avoid consuming alcohol " "before bedtime.  - Reduce your fluid intake before bedtime.  - Avoid daytime napping.  - Consider reading \"No More Sleepless nights\" by Edwar Hall, Ph.D.  - Consider use of online resources including:  - http://Tepha/cbt-online-insomnia-treatment.html  - http://www.[employer]  - CBT-I  Cristopher on your Smart Phone.  - Go! To Sleep by the [facility].    Look up \"grounding techniques\" and/or \"anchoring demonstration\" online and try a few to see what may work for you. Practice these skills before you need them, when you are not feeling too anxious or triggered. You can also search for free guided meditation videos online to help improve your head space when you are feeling very anxious or triggered.      Healthy Diet   The American Heart Association and the American College of Cardiology have long recommended a healthy diet for not only patients who are at risk for atherosclerotic cardiovascular disease (ASCVD) but also the general public. In keeping with this evidence-based recommendation, the \"2018 Guideline on the Management of Blood Cholesterol\" stresses that a healthy diet should include adequate intake of these essentials:   Vegetables, fruits, and whole grains   Legumes and nuts   Low-fat dairy products   Low-fat poultry (without the skin)   Fish and seafood   Nontropical vegetable oils     The recent guidelines do provide room for cultural food preferences in a healthy diet, but in general, all patients should limit their intake of saturated and avoid all trans fats, sweets, sugar-sweetened beverages, and red meats.  Please maintain adequate hydration of at least 2 Liters of water per day, and improve nutrition by decreasing portion sizes, avoiding fried foods, fast foods, inappropriate snacking and overly processed and packaged items with 'added sugars' (whether in drinks or foods), and observing nutritional facts information on any items that are packaged to reduce intake of saturated fats and " AVOID trans fats as mentioned above. Again, consume whole foods such as vegetables, higher lean protein intake, and using healthier lifestyle plans such as the Mediterranean diet with healthier fats such as those from seeds, nuts, and using organic extra virgin olive oil or avocado oil in lieu of processed vegetable oils or margarine.    Physical Activity   Recommended DAILY exercise for at least 150 minutes of moderate exercise weekly!  In addition to a healthy diet, all patients should include regular physical activity in their weekly routines, at moderate to vigorous intensity. Any activity is better than nothing, so if your patients can't meet the recommendation of vigorous activity, moderate-intensity activity can still help them reduce their risk of ASCVD.     Below are the American Heart Association's recommendations for physical activity per week (preferably spread throughout the week):     For Overall Cardiovascular Health and Lowering Cholesterol   At least 150 minutes of moderate-intensity physical activity (for example, 30 minutes, 5 days a week), or   At least 75 minutes of vigorous-intensity physical activity (for example, 25 minutes, 3 days a week); or   A combination of moderate- and vigorous-intensity aerobic activity, and   At least 2 days of moderate- to high-intensity muscle-strengthening activities (such as resistance weight training) for additional health benefits    If you have thoughts of harming yourself or are otherwise in psychological crisis, do not hesitate to contact your County Crisis hotline, or 911 or go to the nearest emergency room.  Adult Crisis Numbers  Suicide Prevention Hotline - Dial 9-8-8  [facility]: [phone] or [phone]  [facility]: [phone]  [facility]: [phone]  [facility]: [phone]  [place]: [phone] or [phone]  [facility]: [phone]  [facility]: [phone] or [facility]:  [phone]  [facility]: 1-673.296.3036 (daytime).       [phone] (after hours, weekends, holidays)     Child/Adolescent Crisis Numbers   [facility]: [phone]   [facility]: [phone]   [place]: [phone]   [place]: [phone]  Please note: Some [facility] do not have a separate number for Child/Adolescent specific crisis. If your county is not listed under Child/Adolescent, please call the adult number for your county     National Talk to Text Line   [place] - 583-878    National Suicide Prevention Hotline: 1-475.759.9933 or call 261.

## 2025-06-03 DIAGNOSIS — K59.09 CHRONIC CONSTIPATION: ICD-10-CM

## 2025-06-04 RX ORDER — POLYETHYLENE GLYCOL 3350 17 G/17G
POWDER, FOR SOLUTION ORAL
Qty: 510 G | Refills: 0 | Status: SHIPPED | OUTPATIENT
Start: 2025-06-04

## 2025-06-04 RX ORDER — DOCUSATE SODIUM 100 MG/1
100 CAPSULE, LIQUID FILLED ORAL 2 TIMES DAILY PRN
Qty: 10 CAPSULE | Refills: 0 | Status: SHIPPED | OUTPATIENT
Start: 2025-06-04

## 2025-06-05 ENCOUNTER — TELEPHONE (OUTPATIENT)
Dept: PSYCHIATRY | Facility: CLINIC | Age: 50
End: 2025-06-05

## 2025-06-05 NOTE — TELEPHONE ENCOUNTER
Unable to leave voicemail informing patient of the Psych Encounter form needing to be signed as a requirement from the insurance company for billing purposes. If patients contacts office, please make patient aware that the form can be accessed via [employer] to sign electronically and must be signed for each visit as a requirement to continue future visits with provider.

## 2025-06-13 ENCOUNTER — TELEPHONE (OUTPATIENT)
Dept: PSYCHIATRY | Facility: CLINIC | Age: 50
End: 2025-06-13

## 2025-06-13 NOTE — TELEPHONE ENCOUNTER
Unable to leave voicemail informing patient of the Psych Encounter form needing to be signed as a requirement from the insurance company for billing purposes. If patients contacts office, please make patient aware that the form can be accessed via [employer] to sign electronically and must be signed for each visit as a requirement to continue future visits with provider.

## 2025-06-25 ENCOUNTER — TELEPHONE (OUTPATIENT)
Dept: BEHAVIORAL/MENTAL HEALTH CLINIC | Facility: CLINIC | Age: 50
End: 2025-06-25

## 2025-07-17 NOTE — PSYCH
Psychiatric Follow Up Visit - Behavioral Health  MRN: [id number]  Visit Time  Start: 1530 (3:30 pm)  Stop: 1558  Total: ~29 minutes  Assessment & Plan  Schizoaffective disorder, unspecified type (HCC)  Status: At goal    Continue medications as prescribed  Continue Invega Sustenna 234 mg / 1.5 mL IM injection regularly every 30 days on the 27th of the month  to be sent to [facility] where they deliver the injection patient reports.  Has been on this dosage for an extended period of time  At baseline has numerous delusions, mild paranoia       Borderline personality disorder (HCC)  Status: At goal       Tobacco abuse  Educated on cessation       Medication refill    Orders:    Invega Sustenna 234 MG/1.5ML IM injection; 1.5 mL (234 mg total) by Intramuscular (deltoid only) route every 30 (thirty) days Do not start before July 28, 2025.    Screening for endocrine, nutritional, metabolic and immunity disorder    Orders:    TSH, 3rd generation with Free T4 reflex; Future    CBC and differential; Future    Vitamin D deficiency    Orders:    Vitamin D 25 hydroxy; Future       Vero MARILY Quijano is a 50 y.o. female, Single with PPHx of schizoaffective disorder, delusional disorder, borderline personality disorder, tobacco abuse, multiple hospitalizations for inpatient behavioral health, 3x [facility] and PMHx including diabetes mellitus, stress incontinence, hypokalemia; with suicide risk factors including chronic mental illness, medical problems, financial problems, limited social/emotional support, anxiety, impulsivity, psychosis, age (50+), and never      In the setting of stability on current psychotropic medication regimen, patient is agreeable with continuing medications at the current dosages and following up in 3 months for further medication management and optimization.  If worsening of symptoms occur, or patient has questions, they can call the office or go to ED for further  "evaluation and management. They understand the importance of continuing with appointments, medications, and obtaining routine labs.       She does continue to have delusions, many of which are related to paranormal activity, the Bible and other Presybeterian texts and she denies paranoid thinking although does note that there are likely cameras watching her that have been present for a long time in various places in the environment around her.  She is not bothered by this just states it is present and happening.  She denies auditory or visual hallucinations and adamantly denies any desires for self-harm or any SI, HI, AVH.  She would like to continue all medications at the same dosage and was counseled on continued exercise and diet due to her continued difficulty with hemoglobin A1c in the past.  She understands that routine laboratory tests are pending at she should have them completed as soon as possible as these need to be completed regularly well at her medication.  Patient verbalized understanding.    Medical  Follow-up with PCP / specialists for ongoing medical care.      Labs  Reviewed labs / imaging.  Continue monitoring CBC/diff, CMP, lipid profile, hemoglobin A1C, TSH and free T4 every 6 months  -------------------------------------------------------  SUBJECTIVE     \"Hidden camera was found in hallway outside my room\" States she asked the [employer] to search for cameras, States she's worried about tyrellriter having been a psychic and knowing her history.  Luciana told her that he had no insight into her personal history,  She stated she was satisfied with this answer.      Has salt lamps all over the house for paranomal activity. Discussing [place] holy land and ghost history and how it relates to current paranormal activity.  States asians asked skin walkers to help them to destroy steffany, due to asians not having public school and  mine workers not beng in school.  They were intimidated and jealous " "and it relates to her because its been 30 years of trespassing her privacy with paranormal activity.    Throughout interview continued to harp on numerous delusions but was not overtly concerned with them and states overall her mental health is stable and she would like to continue all medications.    Continuing all medications at the same dosage at previous visit, symptoms changed as follows:   Sleep: normal sleep; adequate number of sleep hours  Anxiety: denies symptoms  Depression: denies symptoms  Psychosis: only delusions, have many photos she is sharing of regular images and no ghosts in it.  Mood Swings and Irritability: denies symptoms    Other:  Stressors: stable and manageable  Med. AE's: denies any side effects from medications.  ------------------------------------------  Rating Scales  None completed today.  Same SUBJECTIVE level of anxiety and depression as previous appointment    Psychiatric Review Of Systems:  Vero reports Symptoms as described in HPI  Vero denies Current suicidal thoughts, plan, or intent, Current thoughts of self-harm, or Current homicidal thoughts, plan, or intent    Medical Review Of Systems:  Complete review of systems is negative except as noted above.    Mental Status Exam:  Appearance:  alert, good eye contact, appears stated age, casually dressed, and appropriate grooming and hygiene   Behavior:  calm and cooperative   Motor: no abnormal movements and normal gait and balance   Speech:  spontaneous, soft, coherent, and hyperverbal at baseline   Mood:  \"good\"   Affect:  constricted   Thought Process:  generally linear and goal-directed but disorganized and tangential (some difficulty with redirection) at times   Thought Content: delusions of multiple topics at baseline, no obsessive thinking, no paranoia   Perceptual disturbances: no reported hallucinations and does not appear to be responding to internal stimuli at this time   Risk Potential: No active or passive suicidal " or homicidal ideation was verbalized during interview   Cognition: oriented to self and situation, appears to be of average intelligence, and cognition not formally tested   Insight:  Fair   Judgment: Fair   Past Medical History[1]   Past Surgical History[2]  Visit Vitals  OB Status Unknown   Smoking Status Every Day      Wt Readings from Last 6 Encounters:   05/26/25 78.4 kg (172 lb 13.5 oz)   03/28/25 85.3 kg (188 lb 0.8 oz)   01/23/25 88 kg (194 lb)   10/29/24 87.1 kg (192 lb)   08/26/24 87.1 kg (192 lb)   01/22/24 105 kg (231 lb 11.3 oz)      Labs & Imaging:  I have personally reviewed all pertinent laboratory tests and imaging results.   No visits with results within 2 Month(s) from this visit.   Latest known visit with results is:   Orders Only on 09/30/2024   Component Date Value Ref Range Status    Total Cholesterol 09/30/2024 162  <200 mg/dL Final    HDL 09/30/2024 28 (L)  > OR = 50 mg/dL Final    Triglycerides 09/30/2024 117  <150 mg/dL Final    LDL Calculated 09/30/2024 112 (H)  mg/dL (calc) Final    Comment: Reference range: <100     Desirable range <100 mg/dL for primary prevention;    <70 mg/dL for patients with CHD or diabetic patients   with > or = 2 CHD risk factors.     LDL-C is now calculated using the Ziggy-Duarte   calculation, which is a validated novel method providing   better accuracy than the Friedewald equation in the   estimation of LDL-C.   Ziggy MARION et al. NANDA. 2013;310(19): 5325-8305   (http://education.[employer].ScreenMedix/faq/RDK769)      Chol HDLC Ratio 09/30/2024 5.8 (H)  <5.0 (calc) Final    Non-HDL Cholesterol 09/30/2024 134 (H)  <130 mg/dL (calc) Final    Comment: For patients with diabetes plus 1 major ASCVD risk   factor, treating to a non-HDL-C goal of <100 mg/dL   (LDL-C of <70 mg/dL) is considered a therapeutic   option.      Creatinine, Urine 09/30/2024 80  20 - 275 mg/dL Final    Albumin,U,Random 09/30/2024 0.2  See Note: mg/dL Final    Comment: Reference  Range:    Reference Range  Not established      Microalb/Creat Ratio 09/30/2024 3  <30 mg/g creat Final    Comment:    The ADA defines abnormalities in albumin  excretion as follows:     Albuminuria Category        Result (mg/g creatinine)     Normal to Mildly increased   <30  Moderately increased            Severely increased           > OR = 300     The ADA recommends that at least two of three  specimens collected within a 3-6 month period be  abnormal before considering a patient to be  within a diagnostic category.      Glucose, Random 09/30/2024 164 (H)  65 - 99 mg/dL Final    Comment:               Fasting reference interval     For someone without known diabetes, a glucose  value >125 mg/dL indicates that they may have  diabetes and this should be confirmed with a  follow-up test.         BUN 09/30/2024 8  7 - 25 mg/dL Final    Creatinine 09/30/2024 0.87  0.50 - 1.03 mg/dL Final    eGFR 09/30/2024 81  > OR = 60 mL/min/1.73m2 Final    SL AMB BUN/CREATININE RATIO 09/30/2024 SEE NOTE:  6 - 22 (calc) Final    Comment:    Not Reported: BUN and Creatinine are within     reference range.            Sodium 09/30/2024 137  135 - 146 mmol/L Final    Potassium 09/30/2024 4.1  3.5 - 5.3 mmol/L Final    Chloride 09/30/2024 102  98 - 110 mmol/L Final    CO2 09/30/2024 29  20 - 32 mmol/L Final    Calcium 09/30/2024 9.0  8.6 - 10.4 mg/dL Final    Protein, Total 09/30/2024 7.2  6.1 - 8.1 g/dL Final    Albumin 09/30/2024 4.2  3.6 - 5.1 g/dL Final    Globulin 09/30/2024 3.0  1.9 - 3.7 g/dL (calc) Final    Albumin/Globulin Ratio 09/30/2024 1.4  1.0 - 2.5 (calc) Final    TOTAL BILIRUBIN 09/30/2024 0.5  0.2 - 1.2 mg/dL Final    Alkaline Phosphatase 09/30/2024 86  37 - 153 U/L Final    AST 09/30/2024 10  10 - 35 U/L Final    ALT 09/30/2024 9  6 - 29 U/L Final    White Blood Cell Count 09/30/2024 8.8  3.8 - 10.8 Thousand/uL Final    Red Blood Cell Count 09/30/2024 5.17 (H)  3.80 - 5.10 Million/uL Final    Hemoglobin  09/30/2024 14.8  11.7 - 15.5 g/dL Final    HCT 09/30/2024 46.2 (H)  35.0 - 45.0 % Final    MCV 09/30/2024 89.4  80.0 - 100.0 fL Final    MCH 09/30/2024 28.6  27.0 - 33.0 pg Final    MCHC 09/30/2024 32.0  32.0 - 36.0 g/dL Final    Comment: For adults, a slight decrease in the calculated MCHC  value (in the range of 30 to 32 g/dL) is most likely  not clinically significant; however, it should be  interpreted with caution in correlation with other  red cell parameters and the patient's clinical  condition.      RDW 09/30/2024 14.4  11.0 - 15.0 % Final    Platelet Count 09/30/2024 302  140 - 400 Thousand/uL Final    SL AMB MPV 09/30/2024 10.1  7.5 - 12.5 fL Final    Neutrophils (Absolute) 09/30/2024 5,623  1,500 - 7,800 cells/uL Final    Lymphocytes (Absolute) 09/30/2024 2,323  850 - 3,900 cells/uL Final    Monocytes (Absolute) 09/30/2024 598  200 - 950 cells/uL Final    Eosinophils (Absolute) 09/30/2024 211  15 - 500 cells/uL Final    Basophils ABS 09/30/2024 44  0 - 200 cells/uL Final    Neutrophils 09/30/2024 63.9  % Final    Lymphocytes 09/30/2024 26.4  % Final    Monocytes 09/30/2024 6.8  % Final    Eosinophils 09/30/2024 2.4  % Final    Basophils PCT 09/30/2024 0.5  % Final    Hemoglobin A1C 09/30/2024 9.2 (H)  <5.7 % of total Hgb Final    Comment: For someone without known diabetes, a hemoglobin A1c  value of 6.5% or greater indicates that they may have   diabetes and this should be confirmed with a follow-up   test.     For someone with known diabetes, a value <7% indicates   that their diabetes is well controlled and a value   greater than or equal to 7% indicates suboptimal   control. A1c targets should be individualized based on   duration of diabetes, age, comorbid conditions, and   other considerations.     Currently, no consensus exists regarding use of  hemoglobin A1c for diagnosis of diabetes for children.            This test was performed on the Roche duyen c503 platform.  Effective 4/16/24, a change  in test platforms from the  Abbott  to the Roche duyen c503 may have shifted  HbA1c results compared to historical results.  Based on laboratory validation testing conducted at  [facility], the Roche platform relative to the Abbott  platform had an average increase in HbA1c value of  < or = 0.3%. This difference is wi                           thin accepted   variability established by the National Glycohemoglobin  Standardization Program. Note that not all individuals  will have had a shift in their results and direct  comparisons between historical and current results for  testing conducted on different platforms is not  recommended.       Meds / Allergies  Allergies[3]  Current Outpatient Medications   Medication Instructions    diphenhydrAMINE (BENADRYL) 50 mg, Daily at bedtime    docusate sodium (COLACE) 100 mg, Oral, 2 times daily PRN, Hold for lose stool    Invega Sustenna 234 mg, Intramuscular (deltoid only), Every 30 days    polyethylene glycol (GLYCOLAX) 17 GM/SCOOP powder DRINK 1 SCOOP WITH 8 OZ OF WATER      -------------------------------------------------------  Medical Decision Making / Counseling / Coordination of Care:  Treatment Plan was previously completed and not due prior to the next visit.    Interventions recommended today: follow-up for regularly scheduled psychiatry appointments (and if needed, agreeable to move appointment sooner), if patient is in psychotherapy, continue with regularly scheduled individual psychotherapy appointments, and contracts for safety at present - agrees to call Crisis Intervention Service or go to ED if feeling unsafe; Psychoeducation provided to the patient and was educated about the importance of compliance with the medications and psychiatric treatment  Supportive psychotherapy provided to the patient  Solution Focused Brief Therapy (SFBT) provided  Patient's emotions were validated and specific labeled praise provided.   Mary Esther suggestions were offered  in a supportive non-critical way. . Patient understands the importance of obtaining regular labs, maintaining routine follow-ups, reaching out to provider for any concerns, and going to ED for full evaluation if necessary.  We will continue with routine follow-ups and medication adjustments as appropriate.    Lethality Statement: Although patient's acute lethality risk is LOW, long-term/chronic lethality risk is mildly elevated given the risk factors listed above. However, at the current moment, Vero is future-oriented, forward-thinking, and demonstrates ability to act in a self-preserving manner as evidenced by volitionally seeking psychiatric evaluation and treatment today. To mitigate future risk, patient should adhere to treatment recommendations, avoid alcohol/illicit substance use, utilize community-based resources and familiar support, and prioritize mental health treatment. The diagnosis and treatment plan were reviewed with the patient. Risks, benefits, and alternatives to treatment were discussed and the importance of medication and treatment compliance was reviewed with the patient and they verbalize understanding and agreement for treatment.  Presently, patient denies suicidal/homicidal ideation in addition to thoughts of self-injury; contracts for safety, see below for risk assessment. At conclusion of evaluation, patient is amenable to the recommendations of this writer including: starting/continuing/adjusting psychotropic medications as prescribed.  Also, patient is amenable to calling/contacting the outpatient office including this writer if any acute adverse effects of their medication regimen arise in addition to any comments or concerns pertaining to their psychiatric management.  Patient is amenable to calling/contacting crisis and/or attending to the nearest emergency department if their clinical condition deteriorates to assure their safety and stability, stating that they are able to  "appropriately confide in their supports regarding their psychiatric state.    -------------------------------------------------------  Therapy today: Individual supportive psychotherapy was provided at todays visit, I have spent 16 minutes providing psychotherapy    Controlled Medication Discussion: Not applicable - controlled prescriptions are not prescribed by this practice  PDMP Review       None          -------------------------------------------------------  Historical Information    Information is copied from the previous visit and updated today as appropriate.    Psychiatric History:      Past Inpatient / Other Psychiatric Treatment:   Yes, past inpatient psychiatric admission(s)  2.5 years at [facility] - \". [facility].  [facility] x3 for about 2.5 years, per chart review  \"Patient stated that she gets checked into the hospital and is 302'd about once a year.\"  Past Outpatient Psychiatric Treatment:   Prior psychiatry and therapy:   Was in outpatient psychiatric treatment in the past with a psychiatrist  Current therapy:    No, would not like to see a therapist at this point in time, but is open to reconsideration  Past Suicide Attempts / self harm behaviors:  Self harm: 1998 suicide attempt when psychotic [facility].  Suicide attempt: one attempt;  Past Violent Behavior: patient denies  Past Psychiatric Medication Trials:  patient does not remember and multiple psychiatric medication trials  Hydroxyzine  Lorazepam  Trazodone  Haldol  Zyprexa  Invega Sustenna  Invega p.o.  Seroquel  Risperdal     Substance Abuse History:     I have assessed this patient for substance use within the past 12 months     Tobacco use: Current smoker, averages 1 PPD;  Alcohol use: denies use  Cannabis use: denies history of cannabis use in any form.  Other illicit substance use: patient denies  History of " "Inpatient/Outpatient rehabilitation / detox program: patient denies     Family Psychiatric History:   Patient denies any known family history of psychiatric illness, suicide attempt, or substance abuse outside of the below reported:  Psychiatric Illness:  unknown  Suicide attempts:  unknown  Substance abuse:   unknown     Social History  Marital history: single  Children: no  Living arrangement: currently lives with Shanna in a condo who is an RN in [place] (used to give the injection)  Support system: good support system  Education: college graduate   Learning Disabilities: none  Occupational History: unemployed on disability  Legal History: Per chart review:: \"Previous charges of attempting to cause a catastrophe and endangering others \" - none currenrly  Access to firearms: patient denies        Traumatic History:   Abuse / Traumatic events: NO emotional, verbal, physical, or sexual abuse hx, NO traumatic events     Past Medical History:  Eating Disorder: no historical or current eating disorder.   Seizures: patient denies  Head injury / Concussion: no history of head injury or concussions  JOSEPHINE: Denies history of snoring, apneas, daytime tiredness, or any history of sleep apnea  -------------------------------------------------------  This note was not shared with the patient due to reasonable likelihood of causing patient harm    Note: This chart was completed utilizing speech software.  Grammatical errors, random word insertions, pronoun errors, and incomplete sentences are an occasional consequence of the system due to software limitation, ambient noise, and hardware issues.  Any formal questions or concerns about the context, text, or information contained within the body of this dictation should be directly addressed to the physician for clarification.    Ailrio Santizo MD         [1]   Past Medical History:  Diagnosis Date    Acute hypoxemic respiratory failure (HCC) 3/27/2021    Schizoaffective disorder " (HCC)    [2] No past surgical history on file.  [3]   Allergies  Allergen Reactions    Risperidone Shortness Of Breath     Previously tolerated Invega Sustenna and Invega tablets without a problem.    Bacitracin     Effexor [Venlafaxine] Other (See Comments)     Causes elevated glucose    Haloperidol Other (See Comments)     Tardive Dyskinesia      Lexapro [Escitalopram] Other (See Comments)     hyperpigmentation    Zinc     Zyprexa [Olanzapine] Other (See Comments)     Causes elevated glucose

## 2025-07-21 ENCOUNTER — OFFICE VISIT (OUTPATIENT)
Dept: PSYCHIATRY | Facility: CLINIC | Age: 50
End: 2025-07-21
Payer: MEDICARE

## 2025-07-21 DIAGNOSIS — Z13.29 SCREENING FOR ENDOCRINE, NUTRITIONAL, METABOLIC AND IMMUNITY DISORDER: ICD-10-CM

## 2025-07-21 DIAGNOSIS — F60.3 BORDERLINE PERSONALITY DISORDER (HCC): ICD-10-CM

## 2025-07-21 DIAGNOSIS — Z13.0 SCREENING FOR ENDOCRINE, NUTRITIONAL, METABOLIC AND IMMUNITY DISORDER: ICD-10-CM

## 2025-07-21 DIAGNOSIS — Z13.228 SCREENING FOR ENDOCRINE, NUTRITIONAL, METABOLIC AND IMMUNITY DISORDER: ICD-10-CM

## 2025-07-21 DIAGNOSIS — F25.9 SCHIZOAFFECTIVE DISORDER, UNSPECIFIED TYPE (HCC): Primary | ICD-10-CM

## 2025-07-21 DIAGNOSIS — E55.9 VITAMIN D DEFICIENCY: ICD-10-CM

## 2025-07-21 DIAGNOSIS — Z72.0 TOBACCO ABUSE: ICD-10-CM

## 2025-07-21 DIAGNOSIS — Z76.0 MEDICATION REFILL: ICD-10-CM

## 2025-07-21 DIAGNOSIS — Z13.21 SCREENING FOR ENDOCRINE, NUTRITIONAL, METABOLIC AND IMMUNITY DISORDER: ICD-10-CM

## 2025-07-21 PROCEDURE — 99214 OFFICE O/P EST MOD 30 MIN: CPT | Performed by: PSYCHIATRY & NEUROLOGY

## 2025-07-21 PROCEDURE — 90833 PSYTX W PT W E/M 30 MIN: CPT | Performed by: PSYCHIATRY & NEUROLOGY

## 2025-07-21 RX ORDER — PALIPERIDONE PALMITATE 234 MG/1.5ML
234 INJECTION INTRAMUSCULAR
Qty: 1.5 ML | Refills: 3 | Status: SHIPPED | OUTPATIENT
Start: 2025-07-28

## 2025-07-21 NOTE — PATIENT INSTRUCTIONS
"    Please call the office nursing staff for medication issues including refills, problems getting medications, bothersome side effects, etc., at [phone].     Please return for a follow up appointment as discussed and arrive approximately 15 minutes prior to your appointment time. If you are running late or are unable to attend your appointment, please call our [place] office at [phone] (fax: [phone]), or if you were seen in the [facility] office, please call [phone] (fax [phone]).    National Suicide Prevention Hotline: 1-255.387.1630 or call 418.    To improve sleep:  - Keep a consistent sleep schedule. Get up at the same time every day, even on weekends or during vacations.  - Set a bedtime that is early enough for you to get at least 7-8 hours of sleep.  - Don’t go to bed unless you are sleepy.  - If you don’t fall asleep after 20 minutes, get out of bed and take a brief \"break\". Go to a quiet activity without a lot of light exposure. It is especially important to not get on electronics. During this \"break,\" you might consider sitting in a chair and reading a boring book or listening to soft music, until your eyelids start to feel heavy.  Then, would go back to sleep and try again.    - Establish a relaxing bedtime routine.  - Make your bedroom quiet and relaxing. Keep the room at a comfortable, cool temperature.  - Limit exposure to bright light in the evenings.  - Turn off electronic devices at least 30 minutes before bedtime.  - Avoid watching stressful or suspenseful TV programs right before bedtime.  - Don’t eat a large meal before bedtime. If you are hungry at night, eat a light, healthy snack.  - Exercise regularly and maintain a healthy diet.  Participate in regular physical activities like exercise, although avoid approximately 3-4 hours prior to bed.  Morning exercise is ideal.  - Avoid consuming caffeine in the afternoon or evening.  - Avoid consuming alcohol " "before bedtime.  - Reduce your fluid intake before bedtime.  - Avoid daytime napping.  - Consider reading \"No More Sleepless nights\" by Edwar Hall, Ph.D.  - Consider use of online resources including:  - http://[employer]/cbt-online-insomnia-treatment.html  - http://www.[employer]  - CBT-I  Cristopher on your Smart Phone.  - Go! To Sleep by the [facility].    Look up \"grounding techniques\" and/or \"anchoring demonstration\" online and try a few to see what may work for you. Practice these skills before you need them, when you are not feeling too anxious or triggered. You can also search for free guided meditation videos online to help improve your head space when you are feeling very anxious or triggered.      Healthy Diet   The American Heart Association and the American College of Cardiology have long recommended a healthy diet for not only patients who are at risk for atherosclerotic cardiovascular disease (ASCVD) but also the general public. In keeping with this evidence-based recommendation, the \"2018 Guideline on the Management of Blood Cholesterol\" stresses that a healthy diet should include adequate intake of these essentials:   Vegetables, fruits, and whole grains   Legumes and nuts   Low-fat dairy products   Low-fat poultry (without the skin)   Fish and seafood   Nontropical vegetable oils     The recent guidelines do provide room for cultural food preferences in a healthy diet, but in general, all patients should limit their intake of saturated and avoid all trans fats, sweets, sugar-sweetened beverages, and red meats.  Please maintain adequate hydration of at least 2 Liters of water per day, and improve nutrition by decreasing portion sizes, avoiding fried foods, fast foods, inappropriate snacking and overly processed and packaged items with 'added sugars' (whether in drinks or foods), and observing nutritional facts information on any items that are packaged to reduce intake of saturated fats and " AVOID trans fats as mentioned above. Again, consume whole foods such as vegetables, higher lean protein intake, and using healthier lifestyle plans such as the Mediterranean diet with healthier fats such as those from seeds, nuts, and using organic extra virgin olive oil or avocado oil in lieu of processed vegetable oils or margarine.    Physical Activity   Recommended DAILY exercise for at least 150 minutes of moderate exercise weekly!  In addition to a healthy diet, all patients should include regular physical activity in their weekly routines, at moderate to vigorous intensity. Any activity is better than nothing, so if your patients can't meet the recommendation of vigorous activity, moderate-intensity activity can still help them reduce their risk of ASCVD.     Below are the American Heart Association's recommendations for physical activity per week (preferably spread throughout the week):     For Overall Cardiovascular Health and Lowering Cholesterol   At least 150 minutes of moderate-intensity physical activity (for example, 30 minutes, 5 days a week), or   At least 75 minutes of vigorous-intensity physical activity (for example, 25 minutes, 3 days a week); or   A combination of moderate- and vigorous-intensity aerobic activity, and   At least 2 days of moderate- to high-intensity muscle-strengthening activities (such as resistance weight training) for additional health benefits    If you have thoughts of harming yourself or are otherwise in psychological crisis, do not hesitate to contact your County Crisis hotline, or 911 or go to the nearest emergency room.  Adult Crisis Numbers  Suicide Prevention Hotline - Dial 9-8-8  [facility]: [phone] or [phone]  [facility]: [phone]  [facility]: [phone]  [facility]: [phone]  [facility]: [phone] or [phone]  [facility]: [phone]  [facility]: [phone] or [facility]:  [phone]  [facility]: 1-722.540.4668 (daytime).       [phone] (after hours, weekends, holidays)     Child/Adolescent Crisis Numbers   [facility]: [phone]   [facility]: [phone]   [place], [place]: [phone]   [place]: [phone]  Please note: Some [facility] do not have a separate number for Child/Adolescent specific crisis. If your county is not listed under Child/Adolescent, please call the adult number for your county     National Talk to Text Line   [facility] [place] - [phone]    National Suicide Prevention Hotline: 1-864.646.9052 or call 514.